# Patient Record
Sex: MALE | Race: WHITE | Employment: OTHER | URBAN - METROPOLITAN AREA
[De-identification: names, ages, dates, MRNs, and addresses within clinical notes are randomized per-mention and may not be internally consistent; named-entity substitution may affect disease eponyms.]

---

## 2017-01-01 ENCOUNTER — TELEPHONE (OUTPATIENT)
Dept: CARDIOLOGY | Age: 82
End: 2017-01-01

## 2017-01-01 RX ORDER — METOPROLOL TARTRATE 50 MG/1
50 TABLET, FILM COATED ORAL 2 TIMES DAILY
Qty: 60 TABLET | Refills: 0 | Status: SHIPPED | OUTPATIENT
Start: 2017-01-01 | End: 2018-01-01 | Stop reason: SDUPTHER

## 2017-01-01 RX ORDER — METOPROLOL TARTRATE 50 MG/1
TABLET, FILM COATED ORAL
Qty: 60 TABLET | Refills: 0 | Status: SHIPPED | OUTPATIENT
Start: 2017-01-01 | End: 2017-01-01 | Stop reason: SDUPTHER

## 2017-01-01 RX ORDER — DIGOXIN 125 MCG
125 TABLET ORAL EVERY MORNING
Qty: 30 TABLET | Refills: 0 | Status: SHIPPED | OUTPATIENT
Start: 2017-01-01

## 2017-03-30 ENCOUNTER — HOSPITAL ENCOUNTER (OUTPATIENT)
Dept: CARDIOLOGY | Age: 82
Discharge: HOME | End: 2017-03-30
Payer: MEDICARE

## 2017-03-30 PROCEDURE — 93296 REM INTERROG EVL PM/IDS: CPT

## 2017-08-25 RX ORDER — DIGOXIN 125 MCG
125 TABLET ORAL EVERY MORNING
Qty: 90 TABLET | Refills: 3 | Status: SHIPPED | OUTPATIENT
Start: 2017-08-25 | End: 2017-01-01 | Stop reason: SDUPTHER

## 2017-09-26 RX ORDER — MOMETASONE FUROATE 1 MG/G
CREAM TOPICAL
Qty: 50 G | Refills: 1 | Status: SHIPPED | OUTPATIENT
Start: 2017-09-26

## 2017-12-14 NOTE — TELEPHONE ENCOUNTER
PATIENT ACCIDENTALLY REQUESTED THE WRONG MEDICATION TO BE FILLED. PATIENT NEEDED HIS METOPROLOL REFILLED.  PATIENT IS AWARE THAT RX WAS SENT TO REQUESTED PHARMACY

## 2017-12-14 NOTE — TELEPHONE ENCOUNTER
PATIENT MADE AN APPOINTMENT FOR DREA. A 30 DAY SUPPLY OF DIGOXIN WAS SENT TO REQUESTED PHARMACY. PATIENT IS AWARE THAT HE HAS TO KEEP UPCOMMING APPOINTMENT FOR FURTHER REFILLS.

## 2018-01-01 ENCOUNTER — APPOINTMENT (OUTPATIENT)
Dept: INTERVENTIONAL RADIOLOGY/VASCULAR | Age: 83
DRG: 377 | End: 2018-01-01
Payer: MEDICARE

## 2018-01-01 ENCOUNTER — APPOINTMENT (OUTPATIENT)
Dept: GENERAL RADIOLOGY | Age: 83
DRG: 377 | End: 2018-01-01
Payer: MEDICARE

## 2018-01-01 ENCOUNTER — ANESTHESIA EVENT (OUTPATIENT)
Dept: OPERATING ROOM | Age: 83
DRG: 377 | End: 2018-01-01
Payer: MEDICARE

## 2018-01-01 ENCOUNTER — TELEPHONE (OUTPATIENT)
Dept: PHARMACY | Facility: CLINIC | Age: 83
End: 2018-01-01

## 2018-01-01 ENCOUNTER — CARE COORDINATION (OUTPATIENT)
Dept: CASE MANAGEMENT | Age: 83
End: 2018-01-01

## 2018-01-01 ENCOUNTER — APPOINTMENT (OUTPATIENT)
Dept: CT IMAGING | Age: 83
DRG: 310 | End: 2018-01-01
Attending: INTERNAL MEDICINE
Payer: MEDICARE

## 2018-01-01 ENCOUNTER — APPOINTMENT (OUTPATIENT)
Dept: CT IMAGING | Age: 83
DRG: 377 | End: 2018-01-01
Payer: MEDICARE

## 2018-01-01 ENCOUNTER — HOSPITAL ENCOUNTER (INPATIENT)
Age: 83
LOS: 2 days | Discharge: HOME | DRG: 310 | End: 2018-08-16
Attending: INTERNAL MEDICINE | Admitting: INTERNAL MEDICINE
Payer: MEDICARE

## 2018-01-01 ENCOUNTER — APPOINTMENT (OUTPATIENT)
Dept: ULTRASOUND IMAGING | Age: 83
DRG: 310 | End: 2018-01-01
Attending: INTERNAL MEDICINE
Payer: MEDICARE

## 2018-01-01 ENCOUNTER — ANESTHESIA (OUTPATIENT)
Dept: OPERATING ROOM | Age: 83
DRG: 377 | End: 2018-01-01
Payer: MEDICARE

## 2018-01-01 ENCOUNTER — HOSPITAL ENCOUNTER (INPATIENT)
Age: 83
LOS: 20 days | DRG: 377 | End: 2018-11-04
Attending: INTERNAL MEDICINE
Payer: MEDICARE

## 2018-01-01 VITALS
HEART RATE: 106 BPM | SYSTOLIC BLOOD PRESSURE: 140 MMHG | OXYGEN SATURATION: 93 % | HEIGHT: 70 IN | BODY MASS INDEX: 24.71 KG/M2 | RESPIRATION RATE: 26 BRPM | DIASTOLIC BLOOD PRESSURE: 79 MMHG | WEIGHT: 172.62 LBS | TEMPERATURE: 97.8 F

## 2018-01-01 VITALS — OXYGEN SATURATION: 94 % | SYSTOLIC BLOOD PRESSURE: 90 MMHG | DIASTOLIC BLOOD PRESSURE: 51 MMHG

## 2018-01-01 VITALS
RESPIRATION RATE: 16 BRPM | HEART RATE: 62 BPM | SYSTOLIC BLOOD PRESSURE: 112 MMHG | HEIGHT: 70 IN | OXYGEN SATURATION: 99 % | DIASTOLIC BLOOD PRESSURE: 73 MMHG | TEMPERATURE: 97.8 F | WEIGHT: 160.8 LBS | BODY MASS INDEX: 23.02 KG/M2

## 2018-01-01 VITALS — SYSTOLIC BLOOD PRESSURE: 76 MMHG | DIASTOLIC BLOOD PRESSURE: 39 MMHG | OXYGEN SATURATION: 98 %

## 2018-01-01 DIAGNOSIS — N17.9 ACUTE RENAL FAILURE, UNSPECIFIED ACUTE RENAL FAILURE TYPE (HCC): ICD-10-CM

## 2018-01-01 DIAGNOSIS — J96.01 ACUTE RESPIRATORY FAILURE WITH HYPOXIA (HCC): ICD-10-CM

## 2018-01-01 DIAGNOSIS — M62.82 NON-TRAUMATIC RHABDOMYOLYSIS: ICD-10-CM

## 2018-01-01 DIAGNOSIS — E03.9 HYPOTHYROIDISM, UNSPECIFIED TYPE: ICD-10-CM

## 2018-01-01 DIAGNOSIS — R77.8 ELEVATED TROPONIN: ICD-10-CM

## 2018-01-01 DIAGNOSIS — R55 PRE-SYNCOPE: Primary | ICD-10-CM

## 2018-01-01 DIAGNOSIS — K92.2 GASTROINTESTINAL HEMORRHAGE, UNSPECIFIED GASTROINTESTINAL HEMORRHAGE TYPE: Primary | ICD-10-CM

## 2018-01-01 LAB
ABO/RH: NORMAL
ALBUMIN SERPL-MCNC: 2.3 G/DL (ref 3.9–4.9)
ALBUMIN SERPL-MCNC: 2.8 G/DL (ref 3.9–4.9)
ALBUMIN SERPL-MCNC: 2.9 G/DL (ref 3.9–4.9)
ALBUMIN SERPL-MCNC: 3.3 G/DL (ref 3.9–4.9)
ALBUMIN SERPL-MCNC: 3.3 G/DL (ref 3.9–4.9)
ALP BLD-CCNC: 55 U/L (ref 35–104)
ALP BLD-CCNC: 55 U/L (ref 35–104)
ALP BLD-CCNC: 70 U/L (ref 35–104)
ALP BLD-CCNC: 93 U/L (ref 35–104)
ALP BLD-CCNC: 98 U/L (ref 35–104)
ALT SERPL-CCNC: 107 U/L (ref 0–41)
ALT SERPL-CCNC: 116 U/L (ref 0–41)
ALT SERPL-CCNC: 35 U/L (ref 0–41)
ALT SERPL-CCNC: 41 U/L (ref 0–41)
ALT SERPL-CCNC: 97 U/L (ref 0–41)
AMORPHOUS: ABNORMAL
AMPHETAMINE SCREEN, URINE: NORMAL
ANION GAP SERPL CALCULATED.3IONS-SCNC: 10 MEQ/L (ref 7–13)
ANION GAP SERPL CALCULATED.3IONS-SCNC: 11 MEQ/L (ref 7–13)
ANION GAP SERPL CALCULATED.3IONS-SCNC: 12 MEQ/L (ref 7–13)
ANION GAP SERPL CALCULATED.3IONS-SCNC: 13 MEQ/L (ref 7–13)
ANION GAP SERPL CALCULATED.3IONS-SCNC: 14 MEQ/L (ref 7–13)
ANION GAP SERPL CALCULATED.3IONS-SCNC: 21 MEQ/L (ref 7–13)
ANION GAP SERPL CALCULATED.3IONS-SCNC: 22 MEQ/L (ref 7–13)
ANION GAP SERPL CALCULATED.3IONS-SCNC: 26 MEQ/L (ref 7–13)
ANION GAP SERPL CALCULATED.3IONS-SCNC: 7 MEQ/L (ref 7–13)
ANION GAP SERPL CALCULATED.3IONS-SCNC: 9 MEQ/L (ref 7–13)
ANISOCYTOSIS: ABNORMAL
ANISOCYTOSIS: ABNORMAL
ANTIBODY SCREEN: NORMAL
AST SERPL-CCNC: 110 U/L (ref 0–40)
AST SERPL-CCNC: 299 U/L (ref 0–40)
AST SERPL-CCNC: 308 U/L (ref 0–40)
AST SERPL-CCNC: 42 U/L (ref 0–40)
AST SERPL-CCNC: 61 U/L (ref 0–40)
BACTERIA: ABNORMAL /HPF
BACTERIA: ABNORMAL /HPF
BANDED NEUTROPHILS RELATIVE PERCENT: 2 %
BARBITURATE SCREEN URINE: NORMAL
BASE EXCESS ARTERIAL: 0 (ref -3–3)
BASE EXCESS ARTERIAL: 1 (ref -3–3)
BASE EXCESS ARTERIAL: 5 (ref -3–3)
BASE EXCESS ARTERIAL: 6 (ref -3–3)
BASE EXCESS ARTERIAL: 7 (ref -3–3)
BASE EXCESS ARTERIAL: 8 (ref -3–3)
BASE EXCESS ARTERIAL: 8 (ref -3–3)
BASOPHILS ABSOLUTE: 0 K/UL (ref 0–0.2)
BASOPHILS ABSOLUTE: 0.1 K/UL (ref 0–0.2)
BASOPHILS ABSOLUTE: 0.2 K/UL (ref 0–0.2)
BASOPHILS RELATIVE PERCENT: 0 %
BASOPHILS RELATIVE PERCENT: 0.1 %
BASOPHILS RELATIVE PERCENT: 0.2 %
BASOPHILS RELATIVE PERCENT: 1 %
BASOPHILS RELATIVE PERCENT: 2.7 %
BENZODIAZEPINE SCREEN, URINE: NORMAL
BILIRUB SERPL-MCNC: 0.8 MG/DL (ref 0–1.2)
BILIRUB SERPL-MCNC: 1.8 MG/DL (ref 0–1.2)
BILIRUBIN DIRECT: 0.3 MG/DL (ref 0–0.3)
BILIRUBIN DIRECT: 0.5 MG/DL (ref 0–0.3)
BILIRUBIN DIRECT: 0.9 MG/DL (ref 0–0.3)
BILIRUBIN URINE: NEGATIVE
BILIRUBIN URINE: NEGATIVE
BILIRUBIN, INDIRECT: 0.3 MG/DL (ref 0–0.6)
BILIRUBIN, INDIRECT: 0.5 MG/DL (ref 0–0.6)
BILIRUBIN, INDIRECT: 0.9 MG/DL (ref 0–0.6)
BLOOD BANK DISPENSE STATUS: NORMAL
BLOOD BANK PRODUCT CODE: NORMAL
BLOOD CULTURE, ROUTINE: NORMAL
BLOOD, URINE: ABNORMAL
BLOOD, URINE: ABNORMAL
BPU ID: NORMAL
BUN BLDV-MCNC: 105 MG/DL (ref 8–23)
BUN BLDV-MCNC: 108 MG/DL (ref 8–23)
BUN BLDV-MCNC: 113 MG/DL (ref 8–23)
BUN BLDV-MCNC: 118 MG/DL (ref 8–23)
BUN BLDV-MCNC: 16 MG/DL (ref 8–23)
BUN BLDV-MCNC: 17 MG/DL (ref 8–23)
BUN BLDV-MCNC: 18 MG/DL (ref 8–23)
BUN BLDV-MCNC: 18 MG/DL (ref 8–23)
BUN BLDV-MCNC: 19 MG/DL (ref 8–23)
BUN BLDV-MCNC: 21 MG/DL (ref 8–23)
BUN BLDV-MCNC: 22 MG/DL (ref 8–23)
BUN BLDV-MCNC: 23 MG/DL (ref 8–23)
BUN BLDV-MCNC: 23 MG/DL (ref 8–23)
BUN BLDV-MCNC: 24 MG/DL (ref 8–23)
BUN BLDV-MCNC: 24 MG/DL (ref 8–23)
BUN BLDV-MCNC: 27 MG/DL (ref 8–23)
BUN BLDV-MCNC: 28 MG/DL (ref 8–23)
BUN BLDV-MCNC: 37 MG/DL (ref 8–23)
BUN BLDV-MCNC: 44 MG/DL (ref 8–23)
BUN BLDV-MCNC: 44 MG/DL (ref 8–23)
BUN BLDV-MCNC: 58 MG/DL (ref 8–23)
BUN BLDV-MCNC: 59 MG/DL (ref 8–23)
BUN BLDV-MCNC: 59 MG/DL (ref 8–23)
BUN BLDV-MCNC: 60 MG/DL (ref 8–23)
BUN BLDV-MCNC: 66 MG/DL (ref 8–23)
BUN BLDV-MCNC: 68 MG/DL (ref 8–23)
BUN BLDV-MCNC: 68 MG/DL (ref 8–23)
BUN BLDV-MCNC: 74 MG/DL (ref 8–23)
BUN BLDV-MCNC: 81 MG/DL (ref 8–23)
BUN BLDV-MCNC: 86 MG/DL (ref 8–23)
CALCIUM IONIZED: 1.11 MMOL/L (ref 1.12–1.32)
CALCIUM IONIZED: 1.12 MMOL/L (ref 1.12–1.32)
CALCIUM IONIZED: 1.14 MMOL/L (ref 1.12–1.32)
CALCIUM IONIZED: 1.15 MMOL/L (ref 1.12–1.32)
CALCIUM IONIZED: 1.2 MMOL/L (ref 1.12–1.32)
CALCIUM SERPL-MCNC: 6.4 MG/DL (ref 8.6–10.2)
CALCIUM SERPL-MCNC: 6.7 MG/DL (ref 8.6–10.2)
CALCIUM SERPL-MCNC: 6.8 MG/DL (ref 8.6–10.2)
CALCIUM SERPL-MCNC: 6.9 MG/DL (ref 8.6–10.2)
CALCIUM SERPL-MCNC: 7.1 MG/DL (ref 8.6–10.2)
CALCIUM SERPL-MCNC: 7.2 MG/DL (ref 8.6–10.2)
CALCIUM SERPL-MCNC: 7.3 MG/DL (ref 8.6–10.2)
CALCIUM SERPL-MCNC: 7.4 MG/DL (ref 8.6–10.2)
CALCIUM SERPL-MCNC: 7.4 MG/DL (ref 8.6–10.2)
CALCIUM SERPL-MCNC: 7.5 MG/DL (ref 8.6–10.2)
CALCIUM SERPL-MCNC: 7.6 MG/DL (ref 8.6–10.2)
CALCIUM SERPL-MCNC: 7.6 MG/DL (ref 8.6–10.2)
CALCIUM SERPL-MCNC: 7.7 MG/DL (ref 8.6–10.2)
CALCIUM SERPL-MCNC: 7.8 MG/DL (ref 8.6–10.2)
CALCIUM SERPL-MCNC: 7.9 MG/DL (ref 8.6–10.2)
CALCIUM SERPL-MCNC: 8 MG/DL (ref 8.6–10.2)
CALCIUM SERPL-MCNC: 9 MG/DL (ref 8.6–10.2)
CALCIUM SERPL-MCNC: 9.1 MG/DL (ref 8.6–10.2)
CALCIUM SERPL-MCNC: 9.4 MG/DL (ref 8.6–10.2)
CANNABINOID SCREEN URINE: NORMAL
CASTS UA: ABNORMAL /LPF
CHLORIDE BLD-SCNC: 101 MEQ/L (ref 98–107)
CHLORIDE BLD-SCNC: 103 MEQ/L (ref 98–107)
CHLORIDE BLD-SCNC: 105 MEQ/L (ref 98–107)
CHLORIDE BLD-SCNC: 106 MEQ/L (ref 98–107)
CHLORIDE BLD-SCNC: 107 MEQ/L (ref 98–107)
CHLORIDE BLD-SCNC: 107 MEQ/L (ref 98–107)
CHLORIDE BLD-SCNC: 108 MEQ/L (ref 98–107)
CHLORIDE BLD-SCNC: 109 MEQ/L (ref 98–107)
CHLORIDE BLD-SCNC: 110 MEQ/L (ref 98–107)
CHLORIDE BLD-SCNC: 112 MEQ/L (ref 98–107)
CHLORIDE BLD-SCNC: 115 MEQ/L (ref 98–107)
CHLORIDE BLD-SCNC: 115 MEQ/L (ref 98–107)
CHLORIDE BLD-SCNC: 116 MEQ/L (ref 98–107)
CHLORIDE BLD-SCNC: 120 MEQ/L (ref 98–107)
CHLORIDE BLD-SCNC: 120 MEQ/L (ref 98–107)
CHLORIDE BLD-SCNC: 123 MEQ/L (ref 98–107)
CHLORIDE BLD-SCNC: 125 MEQ/L (ref 98–107)
CHLORIDE BLD-SCNC: 92 MEQ/L (ref 98–107)
CHLORIDE BLD-SCNC: 98 MEQ/L (ref 98–107)
CHLORIDE BLD-SCNC: 99 MEQ/L (ref 98–107)
CK MB: 18.1 NG/ML (ref 0–6.7)
CK MB: 229.4 NG/ML (ref 0–6.7)
CK MB: 351.9 NG/ML (ref 0–6.7)
CK MB: 4.9 NG/ML (ref 0–6.7)
CK MB: 47.6 NG/ML (ref 0–6.7)
CK MB: 8.2 NG/ML (ref 0–6.7)
CLARITY: ABNORMAL
CLARITY: CLEAR
CO2: 14 MEQ/L (ref 22–29)
CO2: 14 MEQ/L (ref 22–29)
CO2: 16 MEQ/L (ref 22–29)
CO2: 20 MEQ/L (ref 22–29)
CO2: 21 MEQ/L (ref 22–29)
CO2: 21 MEQ/L (ref 22–29)
CO2: 22 MEQ/L (ref 22–29)
CO2: 23 MEQ/L (ref 22–29)
CO2: 24 MEQ/L (ref 22–29)
CO2: 25 MEQ/L (ref 22–29)
CO2: 25 MEQ/L (ref 22–29)
CO2: 26 MEQ/L (ref 22–29)
CO2: 27 MEQ/L (ref 22–29)
CO2: 28 MEQ/L (ref 22–29)
CO2: 29 MEQ/L (ref 22–29)
CO2: 31 MEQ/L (ref 22–29)
CO2: 32 MEQ/L (ref 22–29)
COCAINE METABOLITE SCREEN URINE: NORMAL
COLOR: YELLOW
COLOR: YELLOW
CORTISOL TOTAL: 17.8 UG/DL
CREAT SERPL-MCNC: 0.76 MG/DL (ref 0.7–1.2)
CREAT SERPL-MCNC: 0.77 MG/DL (ref 0.7–1.2)
CREAT SERPL-MCNC: 0.78 MG/DL (ref 0.7–1.2)
CREAT SERPL-MCNC: 0.79 MG/DL (ref 0.7–1.2)
CREAT SERPL-MCNC: 0.79 MG/DL (ref 0.7–1.2)
CREAT SERPL-MCNC: 0.8 MG/DL (ref 0.7–1.2)
CREAT SERPL-MCNC: 0.81 MG/DL (ref 0.7–1.2)
CREAT SERPL-MCNC: 0.85 MG/DL (ref 0.7–1.2)
CREAT SERPL-MCNC: 0.86 MG/DL (ref 0.7–1.2)
CREAT SERPL-MCNC: 0.92 MG/DL (ref 0.7–1.2)
CREAT SERPL-MCNC: 0.92 MG/DL (ref 0.7–1.2)
CREAT SERPL-MCNC: 0.93 MG/DL (ref 0.7–1.2)
CREAT SERPL-MCNC: 0.93 MG/DL (ref 0.7–1.2)
CREAT SERPL-MCNC: 0.95 MG/DL (ref 0.7–1.2)
CREAT SERPL-MCNC: 1.01 MG/DL (ref 0.7–1.2)
CREAT SERPL-MCNC: 1.03 MG/DL (ref 0.7–1.2)
CREAT SERPL-MCNC: 1.04 MG/DL (ref 0.7–1.2)
CREAT SERPL-MCNC: 1.05 MG/DL (ref 0.7–1.2)
CREAT SERPL-MCNC: 1.09 MG/DL (ref 0.7–1.2)
CREAT SERPL-MCNC: 1.09 MG/DL (ref 0.7–1.2)
CREAT SERPL-MCNC: 1.11 MG/DL (ref 0.7–1.2)
CREAT SERPL-MCNC: 1.16 MG/DL (ref 0.7–1.2)
CREAT SERPL-MCNC: 1.19 MG/DL (ref 0.7–1.2)
CREAT SERPL-MCNC: 1.23 MG/DL (ref 0.7–1.2)
CREAT SERPL-MCNC: 1.42 MG/DL (ref 0.7–1.2)
CREAT SERPL-MCNC: 1.44 MG/DL (ref 0.7–1.2)
CREAT SERPL-MCNC: 1.47 MG/DL (ref 0.7–1.2)
CREAT SERPL-MCNC: 1.51 MG/DL (ref 0.7–1.2)
CREAT SERPL-MCNC: 2.23 MG/DL (ref 0.7–1.2)
CREAT SERPL-MCNC: 3.28 MG/DL (ref 0.7–1.2)
CREAT SERPL-MCNC: 3.63 MG/DL (ref 0.7–1.2)
CREAT SERPL-MCNC: 3.66 MG/DL (ref 0.7–1.2)
CREATINE KINASE-MB INDEX: 0.5 % (ref 0–3.5)
CREATINE KINASE-MB INDEX: 0.6 % (ref 0–3.5)
CREATINE KINASE-MB INDEX: 0.6 % (ref 0–3.5)
CREATINE KINASE-MB INDEX: 0.8 % (ref 0–3.5)
CREATINE KINASE-MB INDEX: 2.6 % (ref 0–3.5)
CREATINE KINASE-MB INDEX: 3.8 % (ref 0–3.5)
CULTURE, BLOOD 2: NORMAL
DESCRIPTION BLOOD BANK: NORMAL
DIGOXIN LEVEL: 0.7 NG/ML (ref 0.8–2)
EKG ATRIAL RATE: 100 BPM
EKG ATRIAL RATE: 133 BPM
EKG ATRIAL RATE: 250 BPM
EKG ATRIAL RATE: 258 BPM
EKG ATRIAL RATE: 288 BPM
EKG ATRIAL RATE: 326 BPM
EKG ATRIAL RATE: 357 BPM
EKG ATRIAL RATE: 37 BPM
EKG ATRIAL RATE: 38 BPM
EKG ATRIAL RATE: 53 BPM
EKG ATRIAL RATE: 58 BPM
EKG ATRIAL RATE: 67 BPM
EKG ATRIAL RATE: 68 BPM
EKG ATRIAL RATE: 70 BPM
EKG ATRIAL RATE: 76 BPM
EKG ATRIAL RATE: 79 BPM
EKG ATRIAL RATE: 85 BPM
EKG ATRIAL RATE: 93 BPM
EKG P AXIS: 97 DEGREES
EKG P-R INTERVAL: 152 MS
EKG Q-T INTERVAL: 326 MS
EKG Q-T INTERVAL: 402 MS
EKG Q-T INTERVAL: 410 MS
EKG Q-T INTERVAL: 414 MS
EKG Q-T INTERVAL: 416 MS
EKG Q-T INTERVAL: 422 MS
EKG Q-T INTERVAL: 428 MS
EKG Q-T INTERVAL: 444 MS
EKG Q-T INTERVAL: 448 MS
EKG Q-T INTERVAL: 452 MS
EKG Q-T INTERVAL: 458 MS
EKG Q-T INTERVAL: 460 MS
EKG Q-T INTERVAL: 470 MS
EKG Q-T INTERVAL: 478 MS
EKG Q-T INTERVAL: 496 MS
EKG Q-T INTERVAL: 544 MS
EKG Q-T INTERVAL: 546 MS
EKG Q-T INTERVAL: 552 MS
EKG QRS DURATION: 152 MS
EKG QRS DURATION: 154 MS
EKG QRS DURATION: 156 MS
EKG QRS DURATION: 158 MS
EKG QRS DURATION: 158 MS
EKG QRS DURATION: 166 MS
EKG QRS DURATION: 168 MS
EKG QRS DURATION: 170 MS
EKG QRS DURATION: 172 MS
EKG QRS DURATION: 174 MS
EKG QRS DURATION: 176 MS
EKG QRS DURATION: 178 MS
EKG QRS DURATION: 184 MS
EKG QTC CALCULATION (BAZETT): 435 MS
EKG QTC CALCULATION (BAZETT): 436 MS
EKG QTC CALCULATION (BAZETT): 436 MS
EKG QTC CALCULATION (BAZETT): 446 MS
EKG QTC CALCULATION (BAZETT): 448 MS
EKG QTC CALCULATION (BAZETT): 456 MS
EKG QTC CALCULATION (BAZETT): 460 MS
EKG QTC CALCULATION (BAZETT): 486 MS
EKG QTC CALCULATION (BAZETT): 508 MS
EKG QTC CALCULATION (BAZETT): 528 MS
EKG QTC CALCULATION (BAZETT): 531 MS
EKG QTC CALCULATION (BAZETT): 540 MS
EKG QTC CALCULATION (BAZETT): 552 MS
EKG QTC CALCULATION (BAZETT): 554 MS
EKG QTC CALCULATION (BAZETT): 561 MS
EKG QTC CALCULATION (BAZETT): 565 MS
EKG QTC CALCULATION (BAZETT): 574 MS
EKG QTC CALCULATION (BAZETT): 586 MS
EKG R AXIS: -10 DEGREES
EKG R AXIS: -12 DEGREES
EKG R AXIS: -13 DEGREES
EKG R AXIS: -18 DEGREES
EKG R AXIS: -19 DEGREES
EKG R AXIS: -21 DEGREES
EKG R AXIS: -22 DEGREES
EKG R AXIS: -23 DEGREES
EKG R AXIS: -24 DEGREES
EKG R AXIS: -24 DEGREES
EKG R AXIS: -31 DEGREES
EKG R AXIS: -35 DEGREES
EKG R AXIS: -35 DEGREES
EKG R AXIS: -60 DEGREES
EKG R AXIS: -61 DEGREES
EKG R AXIS: -65 DEGREES
EKG R AXIS: -67 DEGREES
EKG R AXIS: 1 DEGREES
EKG T AXIS: 121 DEGREES
EKG T AXIS: 128 DEGREES
EKG T AXIS: 132 DEGREES
EKG T AXIS: 134 DEGREES
EKG T AXIS: 136 DEGREES
EKG T AXIS: 142 DEGREES
EKG T AXIS: 148 DEGREES
EKG T AXIS: 160 DEGREES
EKG T AXIS: 160 DEGREES
EKG T AXIS: 162 DEGREES
EKG T AXIS: 169 DEGREES
EKG T AXIS: 173 DEGREES
EKG T AXIS: 177 DEGREES
EKG T AXIS: 187 DEGREES
EKG T AXIS: 78 DEGREES
EKG T AXIS: 79 DEGREES
EKG T AXIS: 82 DEGREES
EKG T AXIS: 85 DEGREES
EKG VENTRICULAR RATE: 100 BPM
EKG VENTRICULAR RATE: 103 BPM
EKG VENTRICULAR RATE: 108 BPM
EKG VENTRICULAR RATE: 61 BPM
EKG VENTRICULAR RATE: 62 BPM
EKG VENTRICULAR RATE: 65 BPM
EKG VENTRICULAR RATE: 65 BPM
EKG VENTRICULAR RATE: 66 BPM
EKG VENTRICULAR RATE: 67 BPM
EKG VENTRICULAR RATE: 68 BPM
EKG VENTRICULAR RATE: 68 BPM
EKG VENTRICULAR RATE: 73 BPM
EKG VENTRICULAR RATE: 74 BPM
EKG VENTRICULAR RATE: 77 BPM
EKG VENTRICULAR RATE: 79 BPM
EKG VENTRICULAR RATE: 82 BPM
EOSINOPHILS ABSOLUTE: 0 K/UL (ref 0–0.7)
EOSINOPHILS ABSOLUTE: 0.1 K/UL (ref 0–0.7)
EOSINOPHILS ABSOLUTE: 0.2 K/UL (ref 0–0.7)
EOSINOPHILS RELATIVE PERCENT: 0 %
EOSINOPHILS RELATIVE PERCENT: 0.2 %
EOSINOPHILS RELATIVE PERCENT: 0.2 %
EOSINOPHILS RELATIVE PERCENT: 1 %
EOSINOPHILS RELATIVE PERCENT: 3 %
EPITHELIAL CELLS, UA: ABNORMAL /HPF
EPITHELIAL CELLS, UA: ABNORMAL /HPF
FIBRINOGEN: 270.7 MG/DL (ref 200–400)
GFR AFRICAN AMERICAN: 19.4
GFR AFRICAN AMERICAN: 19.6
GFR AFRICAN AMERICAN: 22
GFR AFRICAN AMERICAN: 34.3
GFR AFRICAN AMERICAN: 44
GFR AFRICAN AMERICAN: 47
GFR AFRICAN AMERICAN: 53.9
GFR AFRICAN AMERICAN: 54
GFR AFRICAN AMERICAN: 55.6
GFR AFRICAN AMERICAN: 56.9
GFR AFRICAN AMERICAN: 57.8
GFR AFRICAN AMERICAN: 59
GFR AFRICAN AMERICAN: >60
GFR NON-AFRICAN AMERICAN: 16
GFR NON-AFRICAN AMERICAN: 16.2
GFR NON-AFRICAN AMERICAN: 18.2
GFR NON-AFRICAN AMERICAN: 28.4
GFR NON-AFRICAN AMERICAN: 36
GFR NON-AFRICAN AMERICAN: 39
GFR NON-AFRICAN AMERICAN: 44.5
GFR NON-AFRICAN AMERICAN: 45
GFR NON-AFRICAN AMERICAN: 45.9
GFR NON-AFRICAN AMERICAN: 47
GFR NON-AFRICAN AMERICAN: 47.8
GFR NON-AFRICAN AMERICAN: 49
GFR NON-AFRICAN AMERICAN: 56.4
GFR NON-AFRICAN AMERICAN: 58.6
GFR NON-AFRICAN AMERICAN: >60
GLOBULIN: 2.1 G/DL (ref 2.3–3.5)
GLOBULIN: 2.2 G/DL (ref 2.3–3.5)
GLUCOSE BLD-MCNC: 104 MG/DL (ref 74–109)
GLUCOSE BLD-MCNC: 104 MG/DL (ref 74–109)
GLUCOSE BLD-MCNC: 106 MG/DL (ref 74–109)
GLUCOSE BLD-MCNC: 113 MG/DL (ref 74–109)
GLUCOSE BLD-MCNC: 113 MG/DL (ref 74–109)
GLUCOSE BLD-MCNC: 115 MG/DL (ref 74–109)
GLUCOSE BLD-MCNC: 116 MG/DL (ref 74–109)
GLUCOSE BLD-MCNC: 116 MG/DL (ref 74–109)
GLUCOSE BLD-MCNC: 120 MG/DL (ref 74–109)
GLUCOSE BLD-MCNC: 121 MG/DL (ref 60–115)
GLUCOSE BLD-MCNC: 121 MG/DL (ref 74–109)
GLUCOSE BLD-MCNC: 123 MG/DL (ref 74–109)
GLUCOSE BLD-MCNC: 124 MG/DL (ref 74–109)
GLUCOSE BLD-MCNC: 127 MG/DL (ref 60–115)
GLUCOSE BLD-MCNC: 128 MG/DL (ref 74–109)
GLUCOSE BLD-MCNC: 131 MG/DL (ref 60–115)
GLUCOSE BLD-MCNC: 131 MG/DL (ref 74–109)
GLUCOSE BLD-MCNC: 132 MG/DL (ref 60–115)
GLUCOSE BLD-MCNC: 135 MG/DL (ref 60–115)
GLUCOSE BLD-MCNC: 135 MG/DL (ref 74–109)
GLUCOSE BLD-MCNC: 149 MG/DL (ref 60–115)
GLUCOSE BLD-MCNC: 155 MG/DL (ref 60–115)
GLUCOSE BLD-MCNC: 158 MG/DL (ref 60–115)
GLUCOSE BLD-MCNC: 158 MG/DL (ref 74–109)
GLUCOSE BLD-MCNC: 159 MG/DL (ref 60–115)
GLUCOSE BLD-MCNC: 159 MG/DL (ref 60–115)
GLUCOSE BLD-MCNC: 159 MG/DL (ref 74–109)
GLUCOSE BLD-MCNC: 160 MG/DL (ref 60–115)
GLUCOSE BLD-MCNC: 160 MG/DL (ref 60–115)
GLUCOSE BLD-MCNC: 162 MG/DL (ref 60–115)
GLUCOSE BLD-MCNC: 165 MG/DL (ref 60–115)
GLUCOSE BLD-MCNC: 165 MG/DL (ref 74–109)
GLUCOSE BLD-MCNC: 173 MG/DL (ref 60–115)
GLUCOSE BLD-MCNC: 174 MG/DL (ref 60–115)
GLUCOSE BLD-MCNC: 176 MG/DL (ref 60–115)
GLUCOSE BLD-MCNC: 184 MG/DL (ref 74–109)
GLUCOSE BLD-MCNC: 194 MG/DL (ref 60–115)
GLUCOSE BLD-MCNC: 194 MG/DL (ref 60–115)
GLUCOSE BLD-MCNC: 203 MG/DL (ref 60–115)
GLUCOSE BLD-MCNC: 205 MG/DL (ref 60–115)
GLUCOSE BLD-MCNC: 206 MG/DL (ref 74–109)
GLUCOSE BLD-MCNC: 207 MG/DL (ref 60–115)
GLUCOSE BLD-MCNC: 208 MG/DL (ref 60–115)
GLUCOSE BLD-MCNC: 214 MG/DL (ref 74–109)
GLUCOSE BLD-MCNC: 220 MG/DL (ref 74–109)
GLUCOSE BLD-MCNC: 225 MG/DL (ref 60–115)
GLUCOSE BLD-MCNC: 227 MG/DL (ref 60–115)
GLUCOSE BLD-MCNC: 229 MG/DL (ref 60–115)
GLUCOSE BLD-MCNC: 230 MG/DL (ref 60–115)
GLUCOSE BLD-MCNC: 233 MG/DL (ref 60–115)
GLUCOSE BLD-MCNC: 249 MG/DL (ref 60–115)
GLUCOSE BLD-MCNC: 260 MG/DL (ref 60–115)
GLUCOSE BLD-MCNC: 294 MG/DL (ref 74–109)
GLUCOSE BLD-MCNC: 353 MG/DL (ref 60–115)
GLUCOSE BLD-MCNC: 605 MG/DL (ref 74–109)
GLUCOSE BLD-MCNC: 75 MG/DL (ref 74–109)
GLUCOSE BLD-MCNC: 77 MG/DL (ref 74–109)
GLUCOSE BLD-MCNC: 79 MG/DL (ref 74–109)
GLUCOSE BLD-MCNC: 81 MG/DL (ref 74–109)
GLUCOSE BLD-MCNC: 92 MG/DL (ref 74–109)
GLUCOSE BLD-MCNC: 93 MG/DL (ref 60–115)
GLUCOSE BLD-MCNC: 96 MG/DL (ref 74–109)
GLUCOSE BLD-MCNC: 96 MG/DL (ref 74–109)
GLUCOSE BLD-MCNC: 97 MG/DL (ref 74–109)
GLUCOSE URINE: NEGATIVE MG/DL
GLUCOSE URINE: NEGATIVE MG/DL
HCO3 ARTERIAL: 24.2 MMOL/L (ref 21–29)
HCO3 ARTERIAL: 24.4 MMOL/L (ref 21–29)
HCO3 ARTERIAL: 28.1 MMOL/L (ref 21–29)
HCO3 ARTERIAL: 30.2 MMOL/L (ref 21–29)
HCO3 ARTERIAL: 31.2 MMOL/L (ref 21–29)
HCO3 ARTERIAL: 31.6 MMOL/L (ref 21–29)
HCO3 ARTERIAL: 31.9 MMOL/L (ref 21–29)
HCO3 ARTERIAL: 32.2 MMOL/L (ref 21–29)
HCO3 ARTERIAL: 33.4 MMOL/L (ref 21–29)
HCO3 ARTERIAL: 34.2 MMOL/L (ref 21–29)
HCT VFR BLD CALC: 14.9 % (ref 42–52)
HCT VFR BLD CALC: 20.3 % (ref 42–52)
HCT VFR BLD CALC: 20.9 % (ref 42–52)
HCT VFR BLD CALC: 21.3 % (ref 42–52)
HCT VFR BLD CALC: 21.6 % (ref 42–52)
HCT VFR BLD CALC: 21.8 % (ref 42–52)
HCT VFR BLD CALC: 22.1 % (ref 42–52)
HCT VFR BLD CALC: 22.5 % (ref 42–52)
HCT VFR BLD CALC: 22.8 % (ref 42–52)
HCT VFR BLD CALC: 22.8 % (ref 42–52)
HCT VFR BLD CALC: 23.1 % (ref 42–52)
HCT VFR BLD CALC: 23.2 % (ref 42–52)
HCT VFR BLD CALC: 23.5 % (ref 42–52)
HCT VFR BLD CALC: 23.6 % (ref 42–52)
HCT VFR BLD CALC: 23.8 % (ref 42–52)
HCT VFR BLD CALC: 23.9 % (ref 42–52)
HCT VFR BLD CALC: 24.1 % (ref 42–52)
HCT VFR BLD CALC: 24.2 % (ref 42–52)
HCT VFR BLD CALC: 24.6 % (ref 42–52)
HCT VFR BLD CALC: 24.6 % (ref 42–52)
HCT VFR BLD CALC: 24.8 % (ref 42–52)
HCT VFR BLD CALC: 24.8 % (ref 42–52)
HCT VFR BLD CALC: 25.2 % (ref 42–52)
HCT VFR BLD CALC: 25.4 % (ref 42–52)
HCT VFR BLD CALC: 25.4 % (ref 42–52)
HCT VFR BLD CALC: 25.8 % (ref 42–52)
HCT VFR BLD CALC: 26.1 % (ref 42–52)
HCT VFR BLD CALC: 26.1 % (ref 42–52)
HCT VFR BLD CALC: 26.8 % (ref 42–52)
HCT VFR BLD CALC: 27 % (ref 42–52)
HCT VFR BLD CALC: 27.1 % (ref 42–52)
HCT VFR BLD CALC: 27.4 % (ref 42–52)
HCT VFR BLD CALC: 38.3 % (ref 42–52)
HCT VFR BLD CALC: 38.7 % (ref 42–52)
HCT VFR BLD CALC: 39.4 % (ref 42–52)
HEMOGLOBIN: 13.2 G/DL (ref 14–18)
HEMOGLOBIN: 13.3 G/DL (ref 14–18)
HEMOGLOBIN: 13.4 GM/DL (ref 13.5–17.5)
HEMOGLOBIN: 13.5 G/DL (ref 14–18)
HEMOGLOBIN: 4.7 G/DL (ref 14–18)
HEMOGLOBIN: 6.5 G/DL (ref 14–18)
HEMOGLOBIN: 6.8 G/DL (ref 14–18)
HEMOGLOBIN: 7 G/DL (ref 14–18)
HEMOGLOBIN: 7 G/DL (ref 14–18)
HEMOGLOBIN: 7.1 G/DL (ref 14–18)
HEMOGLOBIN: 7.3 G/DL (ref 14–18)
HEMOGLOBIN: 7.5 G/DL (ref 14–18)
HEMOGLOBIN: 7.5 GM/DL (ref 13.5–17.5)
HEMOGLOBIN: 7.6 G/DL (ref 14–18)
HEMOGLOBIN: 7.8 G/DL (ref 14–18)
HEMOGLOBIN: 7.9 G/DL (ref 14–18)
HEMOGLOBIN: 8 G/DL (ref 14–18)
HEMOGLOBIN: 8 GM/DL (ref 13.5–17.5)
HEMOGLOBIN: 8.1 G/DL (ref 14–18)
HEMOGLOBIN: 8.2 G/DL (ref 14–18)
HEMOGLOBIN: 8.2 G/DL (ref 14–18)
HEMOGLOBIN: 8.3 G/DL (ref 14–18)
HEMOGLOBIN: 8.4 G/DL (ref 14–18)
HEMOGLOBIN: 8.4 G/DL (ref 14–18)
HEMOGLOBIN: 8.6 G/DL (ref 14–18)
HEMOGLOBIN: 8.7 GM/DL (ref 13.5–17.5)
HEMOGLOBIN: 8.9 G/DL (ref 14–18)
HEMOGLOBIN: 9 G/DL (ref 14–18)
HEMOGLOBIN: 9.3 GM/DL (ref 13.5–17.5)
HEMOGLOBIN: 9.4 GM/DL (ref 13.5–17.5)
HYPOCHROMIA: 0
HYPOCHROMIA: ABNORMAL
INR BLD: 1.6
KETONES, URINE: NEGATIVE MG/DL
KETONES, URINE: NEGATIVE MG/DL
LACTATE: 0.71 MMOL/L (ref 0.4–2)
LACTATE: 0.91 MMOL/L (ref 0.4–2)
LACTATE: 0.99 MMOL/L (ref 0.4–2)
LACTATE: 1.17 MMOL/L (ref 0.4–2)
LACTATE: 1.35 MMOL/L (ref 0.4–2)
LACTATE: 1.43 MMOL/L (ref 0.4–2)
LACTATE: 1.59 MMOL/L (ref 0.4–2)
LACTATE: 1.62 MMOL/L (ref 0.4–2)
LACTATE: 1.62 MMOL/L (ref 0.4–2)
LACTATE: 1.81 MMOL/L (ref 0.4–2)
LACTIC ACID: 1.3 MMOL/L (ref 0.5–2.2)
LACTIC ACID: 2.3 MMOL/L (ref 0.5–2.2)
LEUKOCYTE ESTERASE, URINE: ABNORMAL
LEUKOCYTE ESTERASE, URINE: NEGATIVE
LV EF: 60 %
LV EF: 60 %
LVEF MODALITY: NORMAL
LVEF MODALITY: NORMAL
LYMPHOCYTES ABSOLUTE: 0.5 K/UL (ref 1–4.8)
LYMPHOCYTES ABSOLUTE: 0.6 K/UL (ref 1–4.8)
LYMPHOCYTES ABSOLUTE: 0.8 K/UL (ref 1–4.8)
LYMPHOCYTES ABSOLUTE: 0.8 K/UL (ref 1–4.8)
LYMPHOCYTES ABSOLUTE: 0.9 K/UL (ref 1–4.8)
LYMPHOCYTES ABSOLUTE: 0.9 K/UL (ref 1–4.8)
LYMPHOCYTES ABSOLUTE: 1.1 K/UL (ref 1–4.8)
LYMPHOCYTES RELATIVE PERCENT: 10.5 %
LYMPHOCYTES RELATIVE PERCENT: 12.9 %
LYMPHOCYTES RELATIVE PERCENT: 15 %
LYMPHOCYTES RELATIVE PERCENT: 4.6 %
LYMPHOCYTES RELATIVE PERCENT: 4.8 %
LYMPHOCYTES RELATIVE PERCENT: 8.4 %
LYMPHOCYTES RELATIVE PERCENT: 9.7 %
Lab: NORMAL
MACROCYTES: 0
MAGNESIUM: 1.8 MG/DL (ref 1.7–2.3)
MAGNESIUM: 2 MG/DL (ref 1.7–2.3)
MAGNESIUM: 2 MG/DL (ref 1.7–2.3)
MAGNESIUM: 2.1 MG/DL (ref 1.7–2.3)
MAGNESIUM: 2.1 MG/DL (ref 1.7–2.3)
MAGNESIUM: 2.4 MG/DL (ref 1.7–2.3)
MAGNESIUM: 3.6 MG/DL (ref 1.7–2.3)
MCH RBC QN AUTO: 30.2 PG (ref 27–31.3)
MCH RBC QN AUTO: 30.3 PG (ref 27–31.3)
MCH RBC QN AUTO: 30.5 PG (ref 27–31.3)
MCH RBC QN AUTO: 30.7 PG (ref 27–31.3)
MCH RBC QN AUTO: 30.8 PG (ref 27–31.3)
MCH RBC QN AUTO: 30.9 PG (ref 27–31.3)
MCH RBC QN AUTO: 31 PG (ref 27–31.3)
MCH RBC QN AUTO: 31.1 PG (ref 27–31.3)
MCH RBC QN AUTO: 31.2 PG (ref 27–31.3)
MCH RBC QN AUTO: 31.3 PG (ref 27–31.3)
MCH RBC QN AUTO: 31.4 PG (ref 27–31.3)
MCH RBC QN AUTO: 31.4 PG (ref 27–31.3)
MCH RBC QN AUTO: 31.5 PG (ref 27–31.3)
MCH RBC QN AUTO: 31.5 PG (ref 27–31.3)
MCH RBC QN AUTO: 31.7 PG (ref 27–31.3)
MCH RBC QN AUTO: 31.8 PG (ref 27–31.3)
MCH RBC QN AUTO: 32.1 PG (ref 27–31.3)
MCH RBC QN AUTO: 33.1 PG (ref 27–31.3)
MCH RBC QN AUTO: 35.3 PG (ref 27–31.3)
MCH RBC QN AUTO: 35.5 PG (ref 27–31.3)
MCH RBC QN AUTO: 35.8 PG (ref 27–31.3)
MCHC RBC AUTO-ENTMCNC: 31.5 % (ref 33–37)
MCHC RBC AUTO-ENTMCNC: 32.2 % (ref 33–37)
MCHC RBC AUTO-ENTMCNC: 32.3 % (ref 33–37)
MCHC RBC AUTO-ENTMCNC: 32.4 % (ref 33–37)
MCHC RBC AUTO-ENTMCNC: 32.4 % (ref 33–37)
MCHC RBC AUTO-ENTMCNC: 32.6 % (ref 33–37)
MCHC RBC AUTO-ENTMCNC: 32.7 % (ref 33–37)
MCHC RBC AUTO-ENTMCNC: 32.7 % (ref 33–37)
MCHC RBC AUTO-ENTMCNC: 32.8 % (ref 33–37)
MCHC RBC AUTO-ENTMCNC: 32.9 % (ref 33–37)
MCHC RBC AUTO-ENTMCNC: 33 % (ref 33–37)
MCHC RBC AUTO-ENTMCNC: 33.1 % (ref 33–37)
MCHC RBC AUTO-ENTMCNC: 33.1 % (ref 33–37)
MCHC RBC AUTO-ENTMCNC: 33.2 % (ref 33–37)
MCHC RBC AUTO-ENTMCNC: 33.3 % (ref 33–37)
MCHC RBC AUTO-ENTMCNC: 33.4 % (ref 33–37)
MCHC RBC AUTO-ENTMCNC: 33.9 % (ref 33–37)
MCHC RBC AUTO-ENTMCNC: 34.3 % (ref 33–37)
MCHC RBC AUTO-ENTMCNC: 34.3 % (ref 33–37)
MCHC RBC AUTO-ENTMCNC: 34.4 % (ref 33–37)
MCV RBC AUTO: 103 FL (ref 80–100)
MCV RBC AUTO: 103.5 FL (ref 80–100)
MCV RBC AUTO: 103.9 FL (ref 80–100)
MCV RBC AUTO: 105.1 FL (ref 80–100)
MCV RBC AUTO: 91.3 FL (ref 80–100)
MCV RBC AUTO: 91.3 FL (ref 80–100)
MCV RBC AUTO: 92.9 FL (ref 80–100)
MCV RBC AUTO: 93.6 FL (ref 80–100)
MCV RBC AUTO: 93.6 FL (ref 80–100)
MCV RBC AUTO: 93.9 FL (ref 80–100)
MCV RBC AUTO: 94.6 FL (ref 80–100)
MCV RBC AUTO: 94.7 FL (ref 80–100)
MCV RBC AUTO: 94.8 FL (ref 80–100)
MCV RBC AUTO: 95 FL (ref 80–100)
MCV RBC AUTO: 95.1 FL (ref 80–100)
MCV RBC AUTO: 95.3 FL (ref 80–100)
MCV RBC AUTO: 95.5 FL (ref 80–100)
MCV RBC AUTO: 95.6 FL (ref 80–100)
MCV RBC AUTO: 97.3 FL (ref 80–100)
MCV RBC AUTO: 97.3 FL (ref 80–100)
MCV RBC AUTO: 97.9 FL (ref 80–100)
MONOCYTES ABSOLUTE: 0.2 K/UL (ref 0.2–0.8)
MONOCYTES ABSOLUTE: 0.4 K/UL (ref 0.2–0.8)
MONOCYTES ABSOLUTE: 0.4 K/UL (ref 0.2–0.8)
MONOCYTES ABSOLUTE: 0.5 K/UL (ref 0.2–0.8)
MONOCYTES ABSOLUTE: 0.6 K/UL (ref 0.2–0.8)
MONOCYTES ABSOLUTE: 0.7 K/UL (ref 0.2–0.8)
MONOCYTES ABSOLUTE: 0.7 K/UL (ref 0.2–0.8)
MONOCYTES RELATIVE PERCENT: 2.3 %
MONOCYTES RELATIVE PERCENT: 4.6 %
MONOCYTES RELATIVE PERCENT: 4.6 %
MONOCYTES RELATIVE PERCENT: 4.9 %
MONOCYTES RELATIVE PERCENT: 5.3 %
MONOCYTES RELATIVE PERCENT: 9.5 %
MONOCYTES RELATIVE PERCENT: 9.7 %
MUCUS: PRESENT
NEUTROPHILS ABSOLUTE: 11.4 K/UL (ref 1.4–6.5)
NEUTROPHILS ABSOLUTE: 5 K/UL (ref 1.4–6.5)
NEUTROPHILS ABSOLUTE: 5.1 K/UL (ref 1.4–6.5)
NEUTROPHILS ABSOLUTE: 6.2 K/UL (ref 1.4–6.5)
NEUTROPHILS ABSOLUTE: 7.3 K/UL (ref 1.4–6.5)
NEUTROPHILS ABSOLUTE: 9.1 K/UL (ref 1.4–6.5)
NEUTROPHILS ABSOLUTE: 9.4 K/UL (ref 1.4–6.5)
NEUTROPHILS RELATIVE PERCENT: 70 %
NEUTROPHILS RELATIVE PERCENT: 73.7 %
NEUTROPHILS RELATIVE PERCENT: 83.9 %
NEUTROPHILS RELATIVE PERCENT: 85.7 %
NEUTROPHILS RELATIVE PERCENT: 86.7 %
NEUTROPHILS RELATIVE PERCENT: 90.4 %
NEUTROPHILS RELATIVE PERCENT: 92.7 %
NITRITE, URINE: NEGATIVE
NITRITE, URINE: NEGATIVE
NUCLEATED RED BLOOD CELLS: 1 /100 WBC
O2 SAT, ARTERIAL: 88 % (ref 93–100)
O2 SAT, ARTERIAL: 89 % (ref 93–100)
O2 SAT, ARTERIAL: 89 % (ref 93–100)
O2 SAT, ARTERIAL: 90 % (ref 93–100)
O2 SAT, ARTERIAL: 93 % (ref 93–100)
O2 SAT, ARTERIAL: 95 % (ref 93–100)
O2 SAT, ARTERIAL: 95 % (ref 93–100)
O2 SAT, ARTERIAL: 97 % (ref 93–100)
O2 SAT, ARTERIAL: 99 % (ref 93–100)
O2 SAT, ARTERIAL: 99 % (ref 93–100)
OPIATE SCREEN URINE: NORMAL
ORGANISM: ABNORMAL
PCO2 ARTERIAL: 33 MM HG (ref 35–45)
PCO2 ARTERIAL: 34 MM HG (ref 35–45)
PCO2 ARTERIAL: 36 MM HG (ref 35–45)
PCO2 ARTERIAL: 43 MM HG (ref 35–45)
PCO2 ARTERIAL: 44 MM HG (ref 35–45)
PCO2 ARTERIAL: 51 MM HG (ref 35–45)
PCO2 ARTERIAL: 58 MM HG (ref 35–45)
PCO2 ARTERIAL: 60 MM HG (ref 35–45)
PCO2 ARTERIAL: 60 MM HG (ref 35–45)
PCO2 ARTERIAL: 89 MM HG (ref 35–45)
PDW BLD-RTO: 13.6 % (ref 11.5–14.5)
PDW BLD-RTO: 13.7 % (ref 11.5–14.5)
PDW BLD-RTO: 13.9 % (ref 11.5–14.5)
PDW BLD-RTO: 15.5 % (ref 11.5–14.5)
PDW BLD-RTO: 16.6 % (ref 11.5–14.5)
PDW BLD-RTO: 16.7 % (ref 11.5–14.5)
PDW BLD-RTO: 16.8 % (ref 11.5–14.5)
PDW BLD-RTO: 17.3 % (ref 11.5–14.5)
PDW BLD-RTO: 17.4 % (ref 11.5–14.5)
PDW BLD-RTO: 17.5 % (ref 11.5–14.5)
PDW BLD-RTO: 17.6 % (ref 11.5–14.5)
PDW BLD-RTO: 17.9 % (ref 11.5–14.5)
PDW BLD-RTO: 17.9 % (ref 11.5–14.5)
PDW BLD-RTO: 18 % (ref 11.5–14.5)
PDW BLD-RTO: 18.1 % (ref 11.5–14.5)
PDW BLD-RTO: 18.4 % (ref 11.5–14.5)
PDW BLD-RTO: 18.4 % (ref 11.5–14.5)
PDW BLD-RTO: 18.5 % (ref 11.5–14.5)
PDW BLD-RTO: 18.5 % (ref 11.5–14.5)
PDW BLD-RTO: 18.6 % (ref 11.5–14.5)
PDW BLD-RTO: 18.7 % (ref 11.5–14.5)
PERFORMED ON: ABNORMAL
PERFORMED ON: NORMAL
PH ARTERIAL: 7.19 (ref 7.35–7.45)
PH ARTERIAL: 7.33 (ref 7.35–7.45)
PH ARTERIAL: 7.35 (ref 7.35–7.45)
PH ARTERIAL: 7.35 (ref 7.35–7.45)
PH ARTERIAL: 7.41 (ref 7.35–7.45)
PH ARTERIAL: 7.45 (ref 7.35–7.45)
PH ARTERIAL: 7.46 (ref 7.35–7.45)
PH ARTERIAL: 7.46 (ref 7.35–7.45)
PH ARTERIAL: 7.47 (ref 7.35–7.45)
PH ARTERIAL: 7.5 (ref 7.35–7.45)
PH UA: 5 (ref 5–9)
PH UA: 5.5 (ref 5–9)
PHENCYCLIDINE SCREEN URINE: NORMAL
PHOSPHORUS: 3.1 MG/DL (ref 2.5–4.5)
PLATELET # BLD: 103 K/UL (ref 130–400)
PLATELET # BLD: 106 K/UL (ref 130–400)
PLATELET # BLD: 108 K/UL (ref 130–400)
PLATELET # BLD: 121 K/UL (ref 130–400)
PLATELET # BLD: 130 K/UL (ref 130–400)
PLATELET # BLD: 131 K/UL (ref 130–400)
PLATELET # BLD: 132 K/UL (ref 130–400)
PLATELET # BLD: 138 K/UL (ref 130–400)
PLATELET # BLD: 181 K/UL (ref 130–400)
PLATELET # BLD: 44 K/UL (ref 130–400)
PLATELET # BLD: 44 K/UL (ref 130–400)
PLATELET # BLD: 49 K/UL (ref 130–400)
PLATELET # BLD: 54 K/UL (ref 130–400)
PLATELET # BLD: 55 K/UL (ref 130–400)
PLATELET # BLD: 59 K/UL (ref 130–400)
PLATELET # BLD: 63 K/UL (ref 130–400)
PLATELET # BLD: 70 K/UL (ref 130–400)
PLATELET # BLD: 71 K/UL (ref 130–400)
PLATELET # BLD: 75 K/UL (ref 130–400)
PLATELET # BLD: 80 K/UL (ref 130–400)
PLATELET # BLD: 81 K/UL (ref 130–400)
PLATELET # BLD: 82 K/UL (ref 130–400)
PLATELET # BLD: 88 K/UL (ref 130–400)
PLATELET SLIDE REVIEW: ABNORMAL
PO2 ARTERIAL: 103 MM HG (ref 75–108)
PO2 ARTERIAL: 161 MM HG (ref 75–108)
PO2 ARTERIAL: 172 MM HG (ref 75–108)
PO2 ARTERIAL: 52 MM HG (ref 75–108)
PO2 ARTERIAL: 52 MM HG (ref 75–108)
PO2 ARTERIAL: 53 MM HG (ref 75–108)
PO2 ARTERIAL: 69 MM HG (ref 75–108)
PO2 ARTERIAL: 72 MM HG (ref 75–108)
PO2 ARTERIAL: 73 MM HG (ref 75–108)
PO2 ARTERIAL: 76 MM HG (ref 75–108)
POC CHLORIDE: 108 MEQ/L (ref 99–110)
POC CHLORIDE: 112 MEQ/L (ref 99–110)
POC CHLORIDE: 112 MEQ/L (ref 99–110)
POC CHLORIDE: 113 MEQ/L (ref 99–110)
POC CHLORIDE: 114 MEQ/L (ref 99–110)
POC CHLORIDE: 114 MEQ/L (ref 99–110)
POC CREATININE: 1.4 MG/DL (ref 0.8–1.3)
POC CREATININE: 1.5 MG/DL (ref 0.8–1.3)
POC CREATININE: 1.7 MG/DL (ref 0.8–1.3)
POC CREATININE: 1.8 MG/DL (ref 0.8–1.3)
POC FIO2: 100
POC FIO2: 100
POC FIO2: 3.5
POC FIO2: 40
POC FIO2: 50
POC FIO2: 55
POC FIO2: 60
POC FIO2: 60
POC FIO2: 65
POC FIO2: 80
POC HEMATOCRIT: 22 % (ref 41–53)
POC HEMATOCRIT: 24 % (ref 41–53)
POC HEMATOCRIT: 25 % (ref 41–53)
POC HEMATOCRIT: 27 % (ref 41–53)
POC HEMATOCRIT: 28 % (ref 41–53)
POC HEMATOCRIT: 39 % (ref 41–53)
POC POTASSIUM: 2.9 MEQ/L (ref 3.5–5.1)
POC POTASSIUM: 3.4 MEQ/L (ref 3.5–5.1)
POC POTASSIUM: 3.5 MEQ/L (ref 3.5–5.1)
POC POTASSIUM: 3.5 MEQ/L (ref 3.5–5.1)
POC POTASSIUM: 3.7 MEQ/L (ref 3.5–5.1)
POC POTASSIUM: 3.9 MEQ/L (ref 3.5–5.1)
POC SAMPLE TYPE: ABNORMAL
POC SODIUM: 145 MEQ/L (ref 136–145)
POC SODIUM: 149 MEQ/L (ref 136–145)
POC SODIUM: 153 MEQ/L (ref 136–145)
POC SODIUM: 154 MEQ/L (ref 136–145)
POC SODIUM: 155 MEQ/L (ref 136–145)
POC SODIUM: 155 MEQ/L (ref 136–145)
POIKILOCYTES: ABNORMAL
POLYCHROMASIA: ABNORMAL
POTASSIUM REFLEX MAGNESIUM: 3.5 MEQ/L (ref 3.5–5.1)
POTASSIUM REFLEX MAGNESIUM: 3.8 MEQ/L (ref 3.5–5.1)
POTASSIUM REFLEX MAGNESIUM: 3.9 MEQ/L (ref 3.5–5.1)
POTASSIUM REFLEX MAGNESIUM: 3.9 MEQ/L (ref 3.5–5.1)
POTASSIUM REFLEX MAGNESIUM: 4.2 MEQ/L (ref 3.5–5.1)
POTASSIUM SERPL-SCNC: 3 MEQ/L (ref 3.5–5.1)
POTASSIUM SERPL-SCNC: 3.1 MEQ/L (ref 3.5–5.1)
POTASSIUM SERPL-SCNC: 3.3 MEQ/L (ref 3.5–5.1)
POTASSIUM SERPL-SCNC: 3.5 MEQ/L (ref 3.5–5.1)
POTASSIUM SERPL-SCNC: 3.6 MEQ/L (ref 3.5–5.1)
POTASSIUM SERPL-SCNC: 3.6 MEQ/L (ref 3.5–5.1)
POTASSIUM SERPL-SCNC: 3.7 MEQ/L (ref 3.5–5.1)
POTASSIUM SERPL-SCNC: 3.8 MEQ/L (ref 3.5–5.1)
POTASSIUM SERPL-SCNC: 3.9 MEQ/L (ref 3.5–5.1)
POTASSIUM SERPL-SCNC: 3.9 MEQ/L (ref 3.5–5.1)
POTASSIUM SERPL-SCNC: 4 MEQ/L (ref 3.5–5.1)
POTASSIUM SERPL-SCNC: 4 MEQ/L (ref 3.5–5.1)
POTASSIUM SERPL-SCNC: 4.1 MEQ/L (ref 3.5–5.1)
POTASSIUM SERPL-SCNC: 4.2 MEQ/L (ref 3.5–5.1)
POTASSIUM SERPL-SCNC: 4.7 MEQ/L (ref 3.5–5.1)
POTASSIUM SERPL-SCNC: 5 MEQ/L (ref 3.5–5.1)
POTASSIUM SERPL-SCNC: 5.1 MEQ/L (ref 3.5–5.1)
POTASSIUM SERPL-SCNC: 5.3 MEQ/L (ref 3.5–5.1)
PRO-BNP: NORMAL PG/ML
PROCALCITONIN: 0.15 NG/ML (ref 0–0.15)
PROCALCITONIN: 0.37 NG/ML (ref 0–0.15)
PROCALCITONIN: 0.44 NG/ML (ref 0–0.15)
PROTEIN UA: 100 MG/DL
PROTEIN UA: 30 MG/DL
PROTHROMBIN TIME: 16.3 SEC (ref 9.6–12.3)
RBC # BLD: 1.42 M/UL (ref 4.7–6.1)
RBC # BLD: 2.15 M/UL (ref 4.7–6.1)
RBC # BLD: 2.18 M/UL (ref 4.7–6.1)
RBC # BLD: 2.22 M/UL (ref 4.7–6.1)
RBC # BLD: 2.35 M/UL (ref 4.7–6.1)
RBC # BLD: 2.36 M/UL (ref 4.7–6.1)
RBC # BLD: 2.39 M/UL (ref 4.7–6.1)
RBC # BLD: 2.44 M/UL (ref 4.7–6.1)
RBC # BLD: 2.5 M/UL (ref 4.7–6.1)
RBC # BLD: 2.52 M/UL (ref 4.7–6.1)
RBC # BLD: 2.52 M/UL (ref 4.7–6.1)
RBC # BLD: 2.55 M/UL (ref 4.7–6.1)
RBC # BLD: 2.56 M/UL (ref 4.7–6.1)
RBC # BLD: 2.61 M/UL (ref 4.7–6.1)
RBC # BLD: 2.69 M/UL (ref 4.7–6.1)
RBC # BLD: 2.71 M/UL (ref 4.7–6.1)
RBC # BLD: 2.75 M/UL (ref 4.7–6.1)
RBC # BLD: 2.79 M/UL (ref 4.7–6.1)
RBC # BLD: 2.88 M/UL (ref 4.7–6.1)
RBC # BLD: 2.93 M/UL (ref 4.7–6.1)
RBC # BLD: 3.7 M/UL (ref 4.7–6.1)
RBC # BLD: 3.75 M/UL (ref 4.7–6.1)
RBC # BLD: 3.79 M/UL (ref 4.7–6.1)
RBC UA: ABNORMAL /HPF (ref 0–2)
RBC UA: ABNORMAL /HPF (ref 0–2)
RENAL EPITHELIAL, UA: ABNORMAL /HPF
RENAL EPITHELIAL, UA: ABNORMAL /HPF
SLIDE REVIEW: ABNORMAL
SLIDE REVIEW: ABNORMAL
SMUDGE CELLS: 3.8
SODIUM BLD-SCNC: 126 MEQ/L (ref 132–144)
SODIUM BLD-SCNC: 139 MEQ/L (ref 132–144)
SODIUM BLD-SCNC: 139 MEQ/L (ref 132–144)
SODIUM BLD-SCNC: 140 MEQ/L (ref 132–144)
SODIUM BLD-SCNC: 141 MEQ/L (ref 132–144)
SODIUM BLD-SCNC: 142 MEQ/L (ref 132–144)
SODIUM BLD-SCNC: 143 MEQ/L (ref 132–144)
SODIUM BLD-SCNC: 144 MEQ/L (ref 132–144)
SODIUM BLD-SCNC: 145 MEQ/L (ref 132–144)
SODIUM BLD-SCNC: 146 MEQ/L (ref 132–144)
SODIUM BLD-SCNC: 148 MEQ/L (ref 132–144)
SODIUM BLD-SCNC: 151 MEQ/L (ref 132–144)
SODIUM BLD-SCNC: 152 MEQ/L (ref 132–144)
SODIUM BLD-SCNC: 152 MEQ/L (ref 132–144)
SODIUM BLD-SCNC: 154 MEQ/L (ref 132–144)
SODIUM BLD-SCNC: 155 MEQ/L (ref 132–144)
SODIUM BLD-SCNC: 155 MEQ/L (ref 132–144)
SODIUM BLD-SCNC: 156 MEQ/L (ref 132–144)
SODIUM BLD-SCNC: 157 MEQ/L (ref 132–144)
SODIUM BLD-SCNC: 159 MEQ/L (ref 132–144)
SPECIFIC GRAVITY UA: 1.02 (ref 1–1.03)
SPECIFIC GRAVITY UA: 1.02 (ref 1–1.03)
T4 FREE: 1.29 NG/DL (ref 0.93–1.7)
TCO2 ARTERIAL: 25 (ref 22–29)
TCO2 ARTERIAL: 25 (ref 22–29)
TCO2 ARTERIAL: 29 (ref 22–29)
TCO2 ARTERIAL: 32 (ref 22–29)
TCO2 ARTERIAL: 33 (ref 22–29)
TCO2 ARTERIAL: 34 (ref 22–29)
TCO2 ARTERIAL: 35 (ref 22–29)
TCO2 ARTERIAL: 37 (ref 22–29)
TOTAL CK: 1527 U/L (ref 0–190)
TOTAL CK: 3198 U/L (ref 0–190)
TOTAL CK: 5942 U/L (ref 0–190)
TOTAL CK: 804 U/L (ref 0–190)
TOTAL CK: 8752 U/L (ref 0–190)
TOTAL CK: 9172 U/L (ref 0–190)
TOTAL PROTEIN: 4.9 G/DL (ref 6.4–8.1)
TOTAL PROTEIN: 5.2 G/DL (ref 6.4–8.1)
TOTAL PROTEIN: 5.4 G/DL (ref 6.4–8.1)
TOTAL PROTEIN: 5.5 G/DL (ref 6.4–8.1)
TOTAL PROTEIN: 5.6 G/DL (ref 6.4–8.1)
TROPONIN: 0.03 NG/ML (ref 0–0.01)
TROPONIN: 0.04 NG/ML (ref 0–0.01)
TROPONIN: 0.04 NG/ML (ref 0–0.01)
TSH REFLEX: 7.56 UIU/ML (ref 0.27–4.2)
TSH SERPL DL<=0.05 MIU/L-ACNC: 4.32 UIU/ML (ref 0.27–4.2)
URINE CULTURE, ROUTINE: ABNORMAL
URINE CULTURE, ROUTINE: ABNORMAL
URINE CULTURE, ROUTINE: NORMAL
URINE REFLEX TO CULTURE: YES
UROBILINOGEN, URINE: 0.2 E.U./DL
UROBILINOGEN, URINE: 1 E.U./DL
VANCOMYCIN TROUGH: 14.1 UG/ML (ref 10–20)
WBC # BLD: 10.2 K/UL (ref 4.8–10.8)
WBC # BLD: 10.5 K/UL (ref 4.8–10.8)
WBC # BLD: 10.8 K/UL (ref 4.8–10.8)
WBC # BLD: 11 K/UL (ref 4.8–10.8)
WBC # BLD: 11 K/UL (ref 4.8–10.8)
WBC # BLD: 11.2 K/UL (ref 4.8–10.8)
WBC # BLD: 11.2 K/UL (ref 4.8–10.8)
WBC # BLD: 11.4 K/UL (ref 4.8–10.8)
WBC # BLD: 12.2 K/UL (ref 4.8–10.8)
WBC # BLD: 12.4 K/UL (ref 4.8–10.8)
WBC # BLD: 12.6 K/UL (ref 4.8–10.8)
WBC # BLD: 13.3 K/UL (ref 4.8–10.8)
WBC # BLD: 14 K/UL (ref 4.8–10.8)
WBC # BLD: 14.3 K/UL (ref 4.8–10.8)
WBC # BLD: 14.4 K/UL (ref 4.8–10.8)
WBC # BLD: 14.8 K/UL (ref 4.8–10.8)
WBC # BLD: 5 K/UL (ref 4.8–10.8)
WBC # BLD: 5.4 K/UL (ref 4.8–10.8)
WBC # BLD: 5.8 K/UL (ref 4.8–10.8)
WBC # BLD: 6.8 K/UL (ref 4.8–10.8)
WBC # BLD: 7.1 K/UL (ref 4.8–10.8)
WBC # BLD: 7.4 K/UL (ref 4.8–10.8)
WBC # BLD: 8.6 K/UL (ref 4.8–10.8)
WBC UA: ABNORMAL /HPF (ref 0–5)
WBC UA: ABNORMAL /HPF (ref 0–5)

## 2018-01-01 PROCEDURE — 6360000002 HC RX W HCPCS: Performed by: INTERNAL MEDICINE

## 2018-01-01 PROCEDURE — 99232 SBSQ HOSP IP/OBS MODERATE 35: CPT | Performed by: INTERNAL MEDICINE

## 2018-01-01 PROCEDURE — 85027 COMPLETE CBC AUTOMATED: CPT

## 2018-01-01 PROCEDURE — 3700000000 HC ANESTHESIA ATTENDED CARE: Performed by: INTERNAL MEDICINE

## 2018-01-01 PROCEDURE — 77001 FLUOROGUIDE FOR VEIN DEVICE: CPT | Performed by: RADIOLOGY

## 2018-01-01 PROCEDURE — 2580000003 HC RX 258: Performed by: PHYSICIAN ASSISTANT

## 2018-01-01 PROCEDURE — 2580000003 HC RX 258: Performed by: INTERNAL MEDICINE

## 2018-01-01 PROCEDURE — 6370000000 HC RX 637 (ALT 250 FOR IP): Performed by: NURSE PRACTITIONER

## 2018-01-01 PROCEDURE — 6370000000 HC RX 637 (ALT 250 FOR IP): Performed by: INTERNAL MEDICINE

## 2018-01-01 PROCEDURE — 71275 CT ANGIOGRAPHY CHEST: CPT

## 2018-01-01 PROCEDURE — 99291 CRITICAL CARE FIRST HOUR: CPT | Performed by: INTERNAL MEDICINE

## 2018-01-01 PROCEDURE — 36430 TRANSFUSION BLD/BLD COMPNT: CPT

## 2018-01-01 PROCEDURE — 36415 COLL VENOUS BLD VENIPUNCTURE: CPT

## 2018-01-01 PROCEDURE — 85014 HEMATOCRIT: CPT

## 2018-01-01 PROCEDURE — C9113 INJ PANTOPRAZOLE SODIUM, VIA: HCPCS | Performed by: INTERNAL MEDICINE

## 2018-01-01 PROCEDURE — 84439 ASSAY OF FREE THYROXINE: CPT

## 2018-01-01 PROCEDURE — 92610 EVALUATE SWALLOWING FUNCTION: CPT

## 2018-01-01 PROCEDURE — 86923 COMPATIBILITY TEST ELECTRIC: CPT

## 2018-01-01 PROCEDURE — 97112 NEUROMUSCULAR REEDUCATION: CPT

## 2018-01-01 PROCEDURE — 80076 HEPATIC FUNCTION PANEL: CPT

## 2018-01-01 PROCEDURE — 2000000000 HC ICU R&B

## 2018-01-01 PROCEDURE — 99233 SBSQ HOSP IP/OBS HIGH 50: CPT | Performed by: INTERNAL MEDICINE

## 2018-01-01 PROCEDURE — 80053 COMPREHEN METABOLIC PANEL: CPT

## 2018-01-01 PROCEDURE — 2500000003 HC RX 250 WO HCPCS: Performed by: INTERNAL MEDICINE

## 2018-01-01 PROCEDURE — 2700000000 HC OXYGEN THERAPY PER DAY

## 2018-01-01 PROCEDURE — 88305 TISSUE EXAM BY PATHOLOGIST: CPT

## 2018-01-01 PROCEDURE — 83605 ASSAY OF LACTIC ACID: CPT

## 2018-01-01 PROCEDURE — 93010 ELECTROCARDIOGRAM REPORT: CPT | Performed by: INTERNAL MEDICINE

## 2018-01-01 PROCEDURE — 94640 AIRWAY INHALATION TREATMENT: CPT

## 2018-01-01 PROCEDURE — 82550 ASSAY OF CK (CPK): CPT

## 2018-01-01 PROCEDURE — 82435 ASSAY OF BLOOD CHLORIDE: CPT

## 2018-01-01 PROCEDURE — 99223 1ST HOSP IP/OBS HIGH 75: CPT | Performed by: INTERNAL MEDICINE

## 2018-01-01 PROCEDURE — APPNB30 APP NON BILLABLE TIME 0-30 MINS: Performed by: NURSE PRACTITIONER

## 2018-01-01 PROCEDURE — 2580000003 HC RX 258: Performed by: NURSE ANESTHETIST, CERTIFIED REGISTERED

## 2018-01-01 PROCEDURE — 82565 ASSAY OF CREATININE: CPT

## 2018-01-01 PROCEDURE — P9016 RBC LEUKOCYTES REDUCED: HCPCS

## 2018-01-01 PROCEDURE — 80048 BASIC METABOLIC PNL TOTAL CA: CPT

## 2018-01-01 PROCEDURE — 2709999900 IR PICC WO SQ PORT/PUMP > 5 YEARS

## 2018-01-01 PROCEDURE — 94003 VENT MGMT INPAT SUBQ DAY: CPT

## 2018-01-01 PROCEDURE — 85025 COMPLETE CBC W/AUTO DIFF WBC: CPT

## 2018-01-01 PROCEDURE — 94664 DEMO&/EVAL PT USE INHALER: CPT

## 2018-01-01 PROCEDURE — 82330 ASSAY OF CALCIUM: CPT

## 2018-01-01 PROCEDURE — 84132 ASSAY OF SERUM POTASSIUM: CPT

## 2018-01-01 PROCEDURE — 97162 PT EVAL MOD COMPLEX 30 MIN: CPT

## 2018-01-01 PROCEDURE — 87077 CULTURE AEROBIC IDENTIFY: CPT

## 2018-01-01 PROCEDURE — 71045 X-RAY EXAM CHEST 1 VIEW: CPT

## 2018-01-01 PROCEDURE — 94760 N-INVAS EAR/PLS OXIMETRY 1: CPT

## 2018-01-01 PROCEDURE — 86901 BLOOD TYPING SEROLOGIC RH(D): CPT

## 2018-01-01 PROCEDURE — 94660 CPAP INITIATION&MGMT: CPT

## 2018-01-01 PROCEDURE — 85018 HEMOGLOBIN: CPT

## 2018-01-01 PROCEDURE — 92526 ORAL FUNCTION THERAPY: CPT

## 2018-01-01 PROCEDURE — 93306 TTE W/DOPPLER COMPLETE: CPT

## 2018-01-01 PROCEDURE — 82553 CREATINE MB FRACTION: CPT

## 2018-01-01 PROCEDURE — 6370000000 HC RX 637 (ALT 250 FOR IP): Performed by: PHYSICIAN ASSISTANT

## 2018-01-01 PROCEDURE — 6360000002 HC RX W HCPCS: Performed by: NURSE PRACTITIONER

## 2018-01-01 PROCEDURE — 70450 CT HEAD/BRAIN W/O DYE: CPT

## 2018-01-01 PROCEDURE — 36600 WITHDRAWAL OF ARTERIAL BLOOD: CPT

## 2018-01-01 PROCEDURE — APPNB15 APP NON BILLABLE TIME 0-15 MINS: Performed by: NURSE PRACTITIONER

## 2018-01-01 PROCEDURE — 6360000004 HC RX CONTRAST MEDICATION: Performed by: INTERNAL MEDICINE

## 2018-01-01 PROCEDURE — 2580000003 HC RX 258

## 2018-01-01 PROCEDURE — 51702 INSERT TEMP BLADDER CATH: CPT

## 2018-01-01 PROCEDURE — G8996 SWALLOW CURRENT STATUS: HCPCS

## 2018-01-01 PROCEDURE — G8978 MOBILITY CURRENT STATUS: HCPCS

## 2018-01-01 PROCEDURE — 36592 COLLECT BLOOD FROM PICC: CPT

## 2018-01-01 PROCEDURE — 2500000003 HC RX 250 WO HCPCS: Performed by: NURSE PRACTITIONER

## 2018-01-01 PROCEDURE — 7100000000 HC PACU RECOVERY - FIRST 15 MIN: Performed by: INTERNAL MEDICINE

## 2018-01-01 PROCEDURE — G8987 SELF CARE CURRENT STATUS: HCPCS

## 2018-01-01 PROCEDURE — 2580000003 HC RX 258: Performed by: NURSE PRACTITIONER

## 2018-01-01 PROCEDURE — 83880 ASSAY OF NATRIURETIC PEPTIDE: CPT

## 2018-01-01 PROCEDURE — 94002 VENT MGMT INPAT INIT DAY: CPT

## 2018-01-01 PROCEDURE — 7100000001 HC PACU RECOVERY - ADDTL 15 MIN: Performed by: INTERNAL MEDICINE

## 2018-01-01 PROCEDURE — 87086 URINE CULTURE/COLONY COUNT: CPT

## 2018-01-01 PROCEDURE — 94761 N-INVAS EAR/PLS OXIMETRY MLT: CPT

## 2018-01-01 PROCEDURE — 97110 THERAPEUTIC EXERCISES: CPT

## 2018-01-01 PROCEDURE — 82803 BLOOD GASES ANY COMBINATION: CPT

## 2018-01-01 PROCEDURE — 02HV33Z INSERTION OF INFUSION DEVICE INTO SUPERIOR VENA CAVA, PERCUTANEOUS APPROACH: ICD-10-PCS | Performed by: RADIOLOGY

## 2018-01-01 PROCEDURE — 84145 PROCALCITONIN (PCT): CPT

## 2018-01-01 PROCEDURE — 83735 ASSAY OF MAGNESIUM: CPT

## 2018-01-01 PROCEDURE — G8988 SELF CARE GOAL STATUS: HCPCS

## 2018-01-01 PROCEDURE — 93005 ELECTROCARDIOGRAM TRACING: CPT

## 2018-01-01 PROCEDURE — 3609027000 HC COLONOSCOPY: Performed by: INTERNAL MEDICINE

## 2018-01-01 PROCEDURE — 82948 REAGENT STRIP/BLOOD GLUCOSE: CPT

## 2018-01-01 PROCEDURE — 97535 SELF CARE MNGMENT TRAINING: CPT

## 2018-01-01 PROCEDURE — 72125 CT NECK SPINE W/O DYE: CPT

## 2018-01-01 PROCEDURE — 74176 CT ABD & PELVIS W/O CONTRAST: CPT

## 2018-01-01 PROCEDURE — 87040 BLOOD CULTURE FOR BACTERIA: CPT

## 2018-01-01 PROCEDURE — G0378 HOSPITAL OBSERVATION PER HR: HCPCS

## 2018-01-01 PROCEDURE — P9046 ALBUMIN (HUMAN), 25%, 20 ML: HCPCS | Performed by: INTERNAL MEDICINE

## 2018-01-01 PROCEDURE — 2500000003 HC RX 250 WO HCPCS: Performed by: NURSE ANESTHETIST, CERTIFIED REGISTERED

## 2018-01-01 PROCEDURE — 45385 COLONOSCOPY W/LESION REMOVAL: CPT | Performed by: INTERNAL MEDICINE

## 2018-01-01 PROCEDURE — 82533 TOTAL CORTISOL: CPT

## 2018-01-01 PROCEDURE — 84295 ASSAY OF SERUM SODIUM: CPT

## 2018-01-01 PROCEDURE — 0DJ08ZZ INSPECTION OF UPPER INTESTINAL TRACT, VIA NATURAL OR ARTIFICIAL OPENING ENDOSCOPIC: ICD-10-PCS | Performed by: INTERNAL MEDICINE

## 2018-01-01 PROCEDURE — 80307 DRUG TEST PRSMV CHEM ANLYZR: CPT

## 2018-01-01 PROCEDURE — 86900 BLOOD TYPING SEROLOGIC ABO: CPT

## 2018-01-01 PROCEDURE — 97140 MANUAL THERAPY 1/> REGIONS: CPT

## 2018-01-01 PROCEDURE — 84443 ASSAY THYROID STIM HORMONE: CPT

## 2018-01-01 PROCEDURE — P9017 PLASMA 1 DONOR FRZ W/IN 8 HR: HCPCS

## 2018-01-01 PROCEDURE — 99222 1ST HOSP IP/OBS MODERATE 55: CPT | Performed by: INTERNAL MEDICINE

## 2018-01-01 PROCEDURE — 45378 DIAGNOSTIC COLONOSCOPY: CPT | Performed by: INTERNAL MEDICINE

## 2018-01-01 PROCEDURE — 30283B1 TRANSFUSION OF NONAUTOLOGOUS 4-FACTOR PROTHROMBIN COMPLEX CONCENTRATE INTO VEIN, PERCUTANEOUS APPROACH: ICD-10-PCS | Performed by: EMERGENCY MEDICINE

## 2018-01-01 PROCEDURE — 2580000003 HC RX 258: Performed by: HOSPITALIST

## 2018-01-01 PROCEDURE — 6360000002 HC RX W HCPCS: Performed by: NURSE ANESTHETIST, CERTIFIED REGISTERED

## 2018-01-01 PROCEDURE — 80162 ASSAY OF DIGOXIN TOTAL: CPT

## 2018-01-01 PROCEDURE — 97167 OT EVAL HIGH COMPLEX 60 MIN: CPT

## 2018-01-01 PROCEDURE — 93880 EXTRACRANIAL BILAT STUDY: CPT

## 2018-01-01 PROCEDURE — C9132 KCENTRA, PER I.U.: HCPCS | Performed by: INTERNAL MEDICINE

## 2018-01-01 PROCEDURE — 86850 RBC ANTIBODY SCREEN: CPT

## 2018-01-01 PROCEDURE — 2060000000 HC ICU INTERMEDIATE R&B

## 2018-01-01 PROCEDURE — 6360000002 HC RX W HCPCS

## 2018-01-01 PROCEDURE — 6370000000 HC RX 637 (ALT 250 FOR IP): Performed by: HOSPITALIST

## 2018-01-01 PROCEDURE — 99213 OFFICE O/P EST LOW 20 MIN: CPT

## 2018-01-01 PROCEDURE — 3609017100 HC EGD: Performed by: INTERNAL MEDICINE

## 2018-01-01 PROCEDURE — 81001 URINALYSIS AUTO W/SCOPE: CPT

## 2018-01-01 PROCEDURE — C9113 INJ PANTOPRAZOLE SODIUM, VIA: HCPCS | Performed by: NURSE PRACTITIONER

## 2018-01-01 PROCEDURE — 84484 ASSAY OF TROPONIN QUANT: CPT

## 2018-01-01 PROCEDURE — 93279 PRGRMG DEV EVAL PM/LDLS PM: CPT

## 2018-01-01 PROCEDURE — 95816 EEG AWAKE AND DROWSY: CPT

## 2018-01-01 PROCEDURE — 36569 INSJ PICC 5 YR+ W/O IMAGING: CPT | Performed by: RADIOLOGY

## 2018-01-01 PROCEDURE — 76937 US GUIDE VASCULAR ACCESS: CPT | Performed by: RADIOLOGY

## 2018-01-01 PROCEDURE — 87186 SC STD MICRODIL/AGAR DIL: CPT

## 2018-01-01 PROCEDURE — 1210000000 HC MED SURG R&B

## 2018-01-01 PROCEDURE — 0DBN8ZX EXCISION OF SIGMOID COLON, VIA NATURAL OR ARTIFICIAL OPENING ENDOSCOPIC, DIAGNOSTIC: ICD-10-PCS | Performed by: INTERNAL MEDICINE

## 2018-01-01 PROCEDURE — 43235 EGD DIAGNOSTIC BRUSH WASH: CPT | Performed by: INTERNAL MEDICINE

## 2018-01-01 PROCEDURE — 2709999900 HC NON-CHARGEABLE SUPPLY: Performed by: INTERNAL MEDICINE

## 2018-01-01 PROCEDURE — 6360000002 HC RX W HCPCS: Performed by: HOSPITALIST

## 2018-01-01 PROCEDURE — 0DJD8ZZ INSPECTION OF LOWER INTESTINAL TRACT, VIA NATURAL OR ARTIFICIAL OPENING ENDOSCOPIC: ICD-10-PCS | Performed by: INTERNAL MEDICINE

## 2018-01-01 PROCEDURE — 99285 EMERGENCY DEPT VISIT HI MDM: CPT

## 2018-01-01 PROCEDURE — G8997 SWALLOW GOAL STATUS: HCPCS

## 2018-01-01 PROCEDURE — 97163 PT EVAL HIGH COMPLEX 45 MIN: CPT

## 2018-01-01 PROCEDURE — 80202 ASSAY OF VANCOMYCIN: CPT

## 2018-01-01 PROCEDURE — 5A1955Z RESPIRATORY VENTILATION, GREATER THAN 96 CONSECUTIVE HOURS: ICD-10-PCS | Performed by: INTERNAL MEDICINE

## 2018-01-01 PROCEDURE — 72170 X-RAY EXAM OF PELVIS: CPT

## 2018-01-01 PROCEDURE — G8979 MOBILITY GOAL STATUS: HCPCS

## 2018-01-01 PROCEDURE — C1773 RET DEV, INSERTABLE: HCPCS | Performed by: INTERNAL MEDICINE

## 2018-01-01 PROCEDURE — 2500000003 HC RX 250 WO HCPCS

## 2018-01-01 PROCEDURE — 84100 ASSAY OF PHOSPHORUS: CPT

## 2018-01-01 PROCEDURE — 1111F DSCHRG MED/CURRENT MED MERGE: CPT | Performed by: FAMILY MEDICINE

## 2018-01-01 PROCEDURE — 74177 CT ABD & PELVIS W/CONTRAST: CPT

## 2018-01-01 PROCEDURE — 85384 FIBRINOGEN ACTIVITY: CPT

## 2018-01-01 PROCEDURE — 93000 ELECTROCARDIOGRAM COMPLETE: CPT | Performed by: INTERNAL MEDICINE

## 2018-01-01 PROCEDURE — 96372 THER/PROPH/DIAG INJ SC/IM: CPT

## 2018-01-01 PROCEDURE — 3700000001 HC ADD 15 MINUTES (ANESTHESIA): Performed by: INTERNAL MEDICINE

## 2018-01-01 PROCEDURE — APPNB45 APP NON BILLABLE 31-45 MINUTES: Performed by: NURSE PRACTITIONER

## 2018-01-01 PROCEDURE — 85610 PROTHROMBIN TIME: CPT

## 2018-01-01 PROCEDURE — 0BH17EZ INSERTION OF ENDOTRACHEAL AIRWAY INTO TRACHEA, VIA NATURAL OR ARTIFICIAL OPENING: ICD-10-PCS | Performed by: INTERNAL MEDICINE

## 2018-01-01 RX ORDER — ONDANSETRON 2 MG/ML
4 INJECTION INTRAMUSCULAR; INTRAVENOUS
Status: DISCONTINUED | OUTPATIENT
Start: 2018-01-01 | End: 2018-01-01 | Stop reason: HOSPADM

## 2018-01-01 RX ORDER — SODIUM CHLORIDE 9 MG/ML
INJECTION, SOLUTION INTRAVENOUS
Status: COMPLETED
Start: 2018-01-01 | End: 2018-01-01

## 2018-01-01 RX ORDER — ONDANSETRON 2 MG/ML
4 INJECTION INTRAMUSCULAR; INTRAVENOUS EVERY 6 HOURS PRN
Status: DISCONTINUED | OUTPATIENT
Start: 2018-01-01 | End: 2018-01-01

## 2018-01-01 RX ORDER — HEPARIN SODIUM 5000 [USP'U]/ML
5000 INJECTION, SOLUTION INTRAVENOUS; SUBCUTANEOUS EVERY 8 HOURS SCHEDULED
Status: DISCONTINUED | OUTPATIENT
Start: 2018-01-01 | End: 2018-01-01

## 2018-01-01 RX ORDER — MAGNESIUM HYDROXIDE 1200 MG/15ML
LIQUID ORAL PRN
Status: DISCONTINUED | OUTPATIENT
Start: 2018-01-01 | End: 2018-01-01 | Stop reason: HOSPADM

## 2018-01-01 RX ORDER — ALBUMIN (HUMAN) 12.5 G/50ML
25 SOLUTION INTRAVENOUS 2 TIMES DAILY
Status: COMPLETED | OUTPATIENT
Start: 2018-01-01 | End: 2018-01-01

## 2018-01-01 RX ORDER — METOPROLOL TARTRATE 50 MG/1
50 TABLET, FILM COATED ORAL 2 TIMES DAILY
Qty: 60 TABLET | Refills: 0 | Status: SHIPPED | OUTPATIENT
Start: 2018-01-01 | End: 2018-01-01 | Stop reason: SDUPTHER

## 2018-01-01 RX ORDER — SODIUM CHLORIDE 9 MG/ML
INJECTION, SOLUTION INTRAVENOUS CONTINUOUS
Status: DISCONTINUED | OUTPATIENT
Start: 2018-01-01 | End: 2018-01-01

## 2018-01-01 RX ORDER — ALBUMIN (HUMAN) 12.5 G/50ML
25 SOLUTION INTRAVENOUS 2 TIMES DAILY
Status: DISPENSED | OUTPATIENT
Start: 2018-01-01 | End: 2018-01-01

## 2018-01-01 RX ORDER — METOPROLOL TARTRATE 50 MG/1
100 TABLET, FILM COATED ORAL 2 TIMES DAILY
Status: DISCONTINUED | OUTPATIENT
Start: 2018-01-01 | End: 2018-01-01 | Stop reason: HOSPADM

## 2018-01-01 RX ORDER — FENTANYL CITRATE 50 UG/ML
25 INJECTION, SOLUTION INTRAMUSCULAR; INTRAVENOUS
Status: DISCONTINUED | OUTPATIENT
Start: 2018-01-01 | End: 2018-01-01 | Stop reason: HOSPADM

## 2018-01-01 RX ORDER — LEVALBUTEROL INHALATION SOLUTION 1.25 MG/3ML
1.25 SOLUTION RESPIRATORY (INHALATION) EVERY 8 HOURS PRN
Status: DISCONTINUED | OUTPATIENT
Start: 2018-01-01 | End: 2018-01-01

## 2018-01-01 RX ORDER — 0.9 % SODIUM CHLORIDE 0.9 %
250 INTRAVENOUS SOLUTION INTRAVENOUS ONCE
Status: DISCONTINUED | OUTPATIENT
Start: 2018-01-01 | End: 2018-01-01

## 2018-01-01 RX ORDER — ACETAMINOPHEN 650 MG/1
650 SUPPOSITORY RECTAL EVERY 4 HOURS PRN
Status: DISCONTINUED | OUTPATIENT
Start: 2018-01-01 | End: 2018-01-01 | Stop reason: ALTCHOICE

## 2018-01-01 RX ORDER — POTASSIUM CHLORIDE 20MEQ/15ML
40 LIQUID (ML) ORAL ONCE
Status: COMPLETED | OUTPATIENT
Start: 2018-01-01 | End: 2018-01-01

## 2018-01-01 RX ORDER — PREDNISONE 10 MG/1
10 TABLET ORAL 2 TIMES DAILY
Status: DISCONTINUED | OUTPATIENT
Start: 2018-01-01 | End: 2018-01-01

## 2018-01-01 RX ORDER — FLUOCINONIDE 0.5 MG/G
OINTMENT TOPICAL DAILY
Status: DISCONTINUED | OUTPATIENT
Start: 2018-01-01 | End: 2018-01-01 | Stop reason: HOSPADM

## 2018-01-01 RX ORDER — MIDODRINE HYDROCHLORIDE 5 MG/1
10 TABLET ORAL
Status: DISCONTINUED | OUTPATIENT
Start: 2018-01-01 | End: 2018-01-01

## 2018-01-01 RX ORDER — SODIUM CHLORIDE 9 MG/ML
INJECTION, SOLUTION INTRAVENOUS
Status: DISPENSED
Start: 2018-01-01 | End: 2018-01-01

## 2018-01-01 RX ORDER — METOCLOPRAMIDE HYDROCHLORIDE 5 MG/ML
10 INJECTION INTRAMUSCULAR; INTRAVENOUS
Status: DISCONTINUED | OUTPATIENT
Start: 2018-01-01 | End: 2018-01-01 | Stop reason: HOSPADM

## 2018-01-01 RX ORDER — SODIUM CHLORIDE 0.9 % (FLUSH) 0.9 %
10 SYRINGE (ML) INJECTION PRN
Status: DISCONTINUED | OUTPATIENT
Start: 2018-01-01 | End: 2018-01-01 | Stop reason: HOSPADM

## 2018-01-01 RX ORDER — LEVALBUTEROL TARTRATE 45 UG/1
4 AEROSOL, METERED ORAL
Status: DISCONTINUED | OUTPATIENT
Start: 2018-01-01 | End: 2018-01-01 | Stop reason: ALTCHOICE

## 2018-01-01 RX ORDER — SODIUM CHLORIDE 0.9 % (FLUSH) 0.9 %
10 SYRINGE (ML) INJECTION PRN
Status: DISCONTINUED | OUTPATIENT
Start: 2018-01-01 | End: 2018-01-01 | Stop reason: SDUPTHER

## 2018-01-01 RX ORDER — PANTOPRAZOLE SODIUM 40 MG/10ML
80 INJECTION, POWDER, LYOPHILIZED, FOR SOLUTION INTRAVENOUS ONCE
Status: COMPLETED | OUTPATIENT
Start: 2018-01-01 | End: 2018-01-01

## 2018-01-01 RX ORDER — POTASSIUM CHLORIDE 20MEQ/15ML
40 LIQUID (ML) ORAL DAILY
Status: DISCONTINUED | OUTPATIENT
Start: 2018-01-01 | End: 2018-01-01

## 2018-01-01 RX ORDER — POTASSIUM CHLORIDE 7.45 MG/ML
10 INJECTION INTRAVENOUS ONCE
Status: COMPLETED | OUTPATIENT
Start: 2018-01-01 | End: 2018-01-01

## 2018-01-01 RX ORDER — PROPOFOL 10 MG/ML
INJECTION, EMULSION INTRAVENOUS PRN
Status: DISCONTINUED | OUTPATIENT
Start: 2018-01-01 | End: 2018-01-01 | Stop reason: SDUPTHER

## 2018-01-01 RX ORDER — DEXTROSE MONOHYDRATE 50 MG/ML
INJECTION, SOLUTION INTRAVENOUS
Status: COMPLETED
Start: 2018-01-01 | End: 2018-01-01

## 2018-01-01 RX ORDER — DEXTROSE MONOHYDRATE 50 MG/ML
INJECTION, SOLUTION INTRAVENOUS CONTINUOUS
Status: DISCONTINUED | OUTPATIENT
Start: 2018-01-01 | End: 2018-01-01

## 2018-01-01 RX ORDER — AMIODARONE HYDROCHLORIDE 200 MG/1
200 TABLET ORAL EVERY 8 HOURS
Status: DISCONTINUED | OUTPATIENT
Start: 2018-01-01 | End: 2018-01-01 | Stop reason: HOSPADM

## 2018-01-01 RX ORDER — LEVALBUTEROL 1.25 MG/.5ML
1.25 SOLUTION, CONCENTRATE RESPIRATORY (INHALATION)
Status: DISCONTINUED | OUTPATIENT
Start: 2018-01-01 | End: 2018-01-01

## 2018-01-01 RX ORDER — PREDNISONE 10 MG/1
10 TABLET ORAL DAILY
Status: DISCONTINUED | OUTPATIENT
Start: 2018-11-06 | End: 2018-01-01 | Stop reason: ALTCHOICE

## 2018-01-01 RX ORDER — DOCUSATE SODIUM 100 MG/1
100 CAPSULE, LIQUID FILLED ORAL 2 TIMES DAILY
Status: DISCONTINUED | OUTPATIENT
Start: 2018-01-01 | End: 2018-01-01

## 2018-01-01 RX ORDER — ETOMIDATE 2 MG/ML
INJECTION INTRAVENOUS
Status: COMPLETED
Start: 2018-01-01 | End: 2018-01-01

## 2018-01-01 RX ORDER — LEVALBUTEROL 1.25 MG/.5ML
1.25 SOLUTION, CONCENTRATE RESPIRATORY (INHALATION) 3 TIMES DAILY
Status: DISCONTINUED | OUTPATIENT
Start: 2018-01-01 | End: 2018-01-01

## 2018-01-01 RX ORDER — PANTOPRAZOLE SODIUM 40 MG/1
40 TABLET, DELAYED RELEASE ORAL
Status: DISCONTINUED | OUTPATIENT
Start: 2018-01-01 | End: 2018-01-01

## 2018-01-01 RX ORDER — ONDANSETRON 2 MG/ML
4 INJECTION INTRAMUSCULAR; INTRAVENOUS
Status: ACTIVE | OUTPATIENT
Start: 2018-01-01 | End: 2018-01-01

## 2018-01-01 RX ORDER — PANTOPRAZOLE SODIUM 40 MG/10ML
40 INJECTION, POWDER, LYOPHILIZED, FOR SOLUTION INTRAVENOUS 2 TIMES DAILY
Status: DISCONTINUED | OUTPATIENT
Start: 2018-01-01 | End: 2018-01-01

## 2018-01-01 RX ORDER — SODIUM CHLORIDE 9 MG/ML
250 INJECTION, SOLUTION INTRAVENOUS ONCE
Status: COMPLETED | OUTPATIENT
Start: 2018-01-01 | End: 2018-01-01

## 2018-01-01 RX ORDER — METHYLPREDNISOLONE SODIUM SUCCINATE 40 MG/ML
40 INJECTION, POWDER, LYOPHILIZED, FOR SOLUTION INTRAMUSCULAR; INTRAVENOUS EVERY 8 HOURS
Status: DISCONTINUED | OUTPATIENT
Start: 2018-01-01 | End: 2018-01-01

## 2018-01-01 RX ORDER — FUROSEMIDE 10 MG/ML
20 INJECTION INTRAMUSCULAR; INTRAVENOUS 3 TIMES DAILY
Status: DISCONTINUED | OUTPATIENT
Start: 2018-01-01 | End: 2018-01-01

## 2018-01-01 RX ORDER — PREDNISONE 10 MG/1
10 TABLET ORAL 2 TIMES DAILY
Status: DISCONTINUED | OUTPATIENT
Start: 2018-01-01 | End: 2018-01-01 | Stop reason: ALTCHOICE

## 2018-01-01 RX ORDER — METHYLPREDNISOLONE SODIUM SUCCINATE 40 MG/ML
40 INJECTION, POWDER, LYOPHILIZED, FOR SOLUTION INTRAMUSCULAR; INTRAVENOUS DAILY
Status: DISCONTINUED | OUTPATIENT
Start: 2018-01-01 | End: 2018-01-01

## 2018-01-01 RX ORDER — MEPERIDINE HYDROCHLORIDE 25 MG/ML
12.5 INJECTION INTRAMUSCULAR; INTRAVENOUS; SUBCUTANEOUS EVERY 5 MIN PRN
Status: DISCONTINUED | OUTPATIENT
Start: 2018-01-01 | End: 2018-01-01 | Stop reason: HOSPADM

## 2018-01-01 RX ORDER — 0.9 % SODIUM CHLORIDE 0.9 %
250 INTRAVENOUS SOLUTION INTRAVENOUS ONCE
Status: COMPLETED | OUTPATIENT
Start: 2018-01-01 | End: 2018-01-01

## 2018-01-01 RX ORDER — SODIUM CHLORIDE, SODIUM LACTATE, POTASSIUM CHLORIDE, AND CALCIUM CHLORIDE .6; .31; .03; .02 G/100ML; G/100ML; G/100ML; G/100ML
1000 INJECTION, SOLUTION INTRAVENOUS ONCE
Status: COMPLETED | OUTPATIENT
Start: 2018-01-01 | End: 2018-01-01

## 2018-01-01 RX ORDER — SODIUM CHLORIDE 0.9 % (FLUSH) 0.9 %
10 SYRINGE (ML) INJECTION EVERY 12 HOURS SCHEDULED
Status: DISCONTINUED | OUTPATIENT
Start: 2018-01-01 | End: 2018-01-01 | Stop reason: HOSPADM

## 2018-01-01 RX ORDER — FAMOTIDINE 20 MG/1
20 TABLET, FILM COATED ORAL 2 TIMES DAILY
Status: DISCONTINUED | OUTPATIENT
Start: 2018-01-01 | End: 2018-01-01 | Stop reason: HOSPADM

## 2018-01-01 RX ORDER — DEXTROSE AND SODIUM CHLORIDE 5; .45 G/100ML; G/100ML
INJECTION, SOLUTION INTRAVENOUS CONTINUOUS
Status: DISCONTINUED | OUTPATIENT
Start: 2018-01-01 | End: 2018-01-01 | Stop reason: CLARIF

## 2018-01-01 RX ORDER — 0.9 % SODIUM CHLORIDE 0.9 %
10 VIAL (ML) INJECTION 2 TIMES DAILY
Status: DISCONTINUED | OUTPATIENT
Start: 2018-01-01 | End: 2018-01-01

## 2018-01-01 RX ORDER — 0.9 % SODIUM CHLORIDE 0.9 %
10 VIAL (ML) INJECTION DAILY
Status: DISCONTINUED | OUTPATIENT
Start: 2018-01-01 | End: 2018-01-01

## 2018-01-01 RX ORDER — POTASSIUM CHLORIDE 7.45 MG/ML
10 INJECTION INTRAVENOUS
Status: COMPLETED | OUTPATIENT
Start: 2018-01-01 | End: 2018-01-01

## 2018-01-01 RX ORDER — PANTOPRAZOLE SODIUM 40 MG/10ML
40 INJECTION, POWDER, LYOPHILIZED, FOR SOLUTION INTRAVENOUS DAILY
Status: DISCONTINUED | OUTPATIENT
Start: 2018-01-01 | End: 2018-01-01 | Stop reason: ALTCHOICE

## 2018-01-01 RX ORDER — LIDOCAINE HYDROCHLORIDE 20 MG/ML
INJECTION, SOLUTION INFILTRATION; PERINEURAL PRN
Status: DISCONTINUED | OUTPATIENT
Start: 2018-01-01 | End: 2018-01-01 | Stop reason: SDUPTHER

## 2018-01-01 RX ORDER — METOPROLOL TARTRATE 50 MG/1
50 TABLET, FILM COATED ORAL 2 TIMES DAILY
Status: DISCONTINUED | OUTPATIENT
Start: 2018-01-01 | End: 2018-01-01

## 2018-01-01 RX ORDER — PANTOPRAZOLE SODIUM 40 MG/10ML
40 INJECTION, POWDER, LYOPHILIZED, FOR SOLUTION INTRAVENOUS EVERY 12 HOURS
Status: DISCONTINUED | OUTPATIENT
Start: 2018-01-01 | End: 2018-01-01

## 2018-01-01 RX ORDER — APIXABAN 5 MG/1
TABLET, FILM COATED ORAL
Qty: 60 TABLET | Refills: 0 | Status: SHIPPED | OUTPATIENT
Start: 2018-01-01

## 2018-01-01 RX ORDER — CIPROFLOXACIN 2 MG/ML
400 INJECTION, SOLUTION INTRAVENOUS EVERY 12 HOURS
Status: DISCONTINUED | OUTPATIENT
Start: 2018-01-01 | End: 2018-01-01

## 2018-01-01 RX ORDER — FUROSEMIDE 10 MG/ML
20 INJECTION INTRAMUSCULAR; INTRAVENOUS ONCE
Status: COMPLETED | OUTPATIENT
Start: 2018-01-01 | End: 2018-01-01

## 2018-01-01 RX ORDER — POTASSIUM CHLORIDE 20MEQ/15ML
20 LIQUID (ML) ORAL DAILY
Status: DISCONTINUED | OUTPATIENT
Start: 2018-01-01 | End: 2018-01-01

## 2018-01-01 RX ORDER — PROPOFOL 10 MG/ML
10 INJECTION, EMULSION INTRAVENOUS
Status: DISCONTINUED | OUTPATIENT
Start: 2018-01-01 | End: 2018-01-01 | Stop reason: ALTCHOICE

## 2018-01-01 RX ORDER — DIGOXIN 0.25 MG/ML
125 INJECTION INTRAMUSCULAR; INTRAVENOUS DAILY
Status: DISCONTINUED | OUTPATIENT
Start: 2018-01-01 | End: 2018-01-01

## 2018-01-01 RX ORDER — DEXTROSE MONOHYDRATE 50 MG/ML
100 INJECTION, SOLUTION INTRAVENOUS PRN
Status: DISCONTINUED | OUTPATIENT
Start: 2018-01-01 | End: 2018-01-01 | Stop reason: ALTCHOICE

## 2018-01-01 RX ORDER — 0.9 % SODIUM CHLORIDE 0.9 %
10 VIAL (ML) INJECTION ONCE
Status: COMPLETED | OUTPATIENT
Start: 2018-01-01 | End: 2018-01-01

## 2018-01-01 RX ORDER — LIDOCAINE HYDROCHLORIDE 10 MG/ML
1 INJECTION, SOLUTION EPIDURAL; INFILTRATION; INTRACAUDAL; PERINEURAL
Status: DISCONTINUED | OUTPATIENT
Start: 2018-01-01 | End: 2018-01-01 | Stop reason: HOSPADM

## 2018-01-01 RX ORDER — 0.9 % SODIUM CHLORIDE 0.9 %
250 INTRAVENOUS SOLUTION INTRAVENOUS ONCE
Status: DISCONTINUED | OUTPATIENT
Start: 2018-01-01 | End: 2018-01-01 | Stop reason: SDUPTHER

## 2018-01-01 RX ORDER — SODIUM CHLORIDE 0.9 % (FLUSH) 0.9 %
10 SYRINGE (ML) INJECTION EVERY 12 HOURS SCHEDULED
Status: DISCONTINUED | OUTPATIENT
Start: 2018-01-01 | End: 2018-01-01 | Stop reason: SDUPTHER

## 2018-01-01 RX ORDER — PROPOFOL 10 MG/ML
INJECTION, EMULSION INTRAVENOUS
Status: COMPLETED
Start: 2018-01-01 | End: 2018-01-01

## 2018-01-01 RX ORDER — AMIODARONE HYDROCHLORIDE 200 MG/1
200 TABLET ORAL EVERY 8 HOURS
Qty: 30 TABLET | Refills: 3 | Status: SHIPPED | OUTPATIENT
Start: 2018-01-01 | End: 2018-01-01 | Stop reason: SDUPTHER

## 2018-01-01 RX ORDER — LEVALBUTEROL TARTRATE 45 UG/1
4 AEROSOL, METERED ORAL 3 TIMES DAILY
Status: DISCONTINUED | OUTPATIENT
Start: 2018-01-01 | End: 2018-01-01 | Stop reason: ALTCHOICE

## 2018-01-01 RX ORDER — FUROSEMIDE 10 MG/ML
20 INJECTION INTRAMUSCULAR; INTRAVENOUS 2 TIMES DAILY
Status: DISCONTINUED | OUTPATIENT
Start: 2018-01-01 | End: 2018-01-01

## 2018-01-01 RX ORDER — DIPHENHYDRAMINE HYDROCHLORIDE 50 MG/ML
12.5 INJECTION INTRAMUSCULAR; INTRAVENOUS
Status: DISCONTINUED | OUTPATIENT
Start: 2018-01-01 | End: 2018-01-01 | Stop reason: HOSPADM

## 2018-01-01 RX ORDER — MORPHINE SULFATE 4 MG/ML
4 INJECTION, SOLUTION INTRAMUSCULAR; INTRAVENOUS
Status: DISCONTINUED | OUTPATIENT
Start: 2018-01-01 | End: 2018-01-01 | Stop reason: HOSPADM

## 2018-01-01 RX ORDER — 0.9 % SODIUM CHLORIDE 0.9 %
500 INTRAVENOUS SOLUTION INTRAVENOUS ONCE
Status: COMPLETED | OUTPATIENT
Start: 2018-01-01 | End: 2018-01-01

## 2018-01-01 RX ORDER — DEXTROSE AND SODIUM CHLORIDE 5; .45 G/100ML; G/100ML
INJECTION, SOLUTION INTRAVENOUS CONTINUOUS
Status: DISCONTINUED | OUTPATIENT
Start: 2018-01-01 | End: 2018-01-01

## 2018-01-01 RX ORDER — SODIUM CHLORIDE 450 MG/100ML
INJECTION, SOLUTION INTRAVENOUS
Status: DISPENSED
Start: 2018-01-01 | End: 2018-01-01

## 2018-01-01 RX ORDER — ACETAMINOPHEN 325 MG/1
650 TABLET ORAL EVERY 4 HOURS PRN
Status: DISCONTINUED | OUTPATIENT
Start: 2018-01-01 | End: 2018-01-01 | Stop reason: HOSPADM

## 2018-01-01 RX ORDER — SODIUM CHLORIDE, SODIUM LACTATE, POTASSIUM CHLORIDE, CALCIUM CHLORIDE 600; 310; 30; 20 MG/100ML; MG/100ML; MG/100ML; MG/100ML
INJECTION, SOLUTION INTRAVENOUS CONTINUOUS
Status: DISCONTINUED | OUTPATIENT
Start: 2018-01-01 | End: 2018-01-01

## 2018-01-01 RX ORDER — METOPROLOL TARTRATE 5 MG/5ML
2.5 INJECTION INTRAVENOUS EVERY 4 HOURS
Status: DISCONTINUED | OUTPATIENT
Start: 2018-01-01 | End: 2018-01-01

## 2018-01-01 RX ORDER — ACETAMINOPHEN 325 MG/1
650 TABLET ORAL EVERY 4 HOURS PRN
Status: DISCONTINUED | OUTPATIENT
Start: 2018-01-01 | End: 2018-01-01 | Stop reason: ALTCHOICE

## 2018-01-01 RX ORDER — ASCORBIC ACID 500 MG
1000 TABLET ORAL DAILY
Status: DISCONTINUED | OUTPATIENT
Start: 2018-01-01 | End: 2018-01-01 | Stop reason: HOSPADM

## 2018-01-01 RX ORDER — CHLORHEXIDINE GLUCONATE 0.12 MG/ML
15 RINSE ORAL 2 TIMES DAILY
Status: DISCONTINUED | OUTPATIENT
Start: 2018-01-01 | End: 2018-01-01 | Stop reason: ALTCHOICE

## 2018-01-01 RX ORDER — DIGOXIN 125 MCG
125 TABLET ORAL EVERY MORNING
Status: DISCONTINUED | OUTPATIENT
Start: 2018-01-01 | End: 2018-01-01 | Stop reason: HOSPADM

## 2018-01-01 RX ORDER — HYDROCODONE BITARTRATE AND ACETAMINOPHEN 5; 325 MG/1; MG/1
2 TABLET ORAL PRN
Status: DISCONTINUED | OUTPATIENT
Start: 2018-01-01 | End: 2018-01-01 | Stop reason: HOSPADM

## 2018-01-01 RX ORDER — LANOLIN ALCOHOL/MO/W.PET/CERES
3 CREAM (GRAM) TOPICAL NIGHTLY PRN
Status: DISCONTINUED | OUTPATIENT
Start: 2018-01-01 | End: 2018-01-01 | Stop reason: HOSPADM

## 2018-01-01 RX ORDER — LIDOCAINE HYDROCHLORIDE 20 MG/ML
5 INJECTION, SOLUTION INFILTRATION; PERINEURAL ONCE
Status: COMPLETED | OUTPATIENT
Start: 2018-01-01 | End: 2018-01-01

## 2018-01-01 RX ORDER — DEXTROSE MONOHYDRATE 25 G/50ML
12.5 INJECTION, SOLUTION INTRAVENOUS PRN
Status: DISCONTINUED | OUTPATIENT
Start: 2018-01-01 | End: 2018-01-01 | Stop reason: ALTCHOICE

## 2018-01-01 RX ORDER — HYDROCODONE BITARTRATE AND ACETAMINOPHEN 5; 325 MG/1; MG/1
1 TABLET ORAL PRN
Status: DISCONTINUED | OUTPATIENT
Start: 2018-01-01 | End: 2018-01-01 | Stop reason: HOSPADM

## 2018-01-01 RX ORDER — 0.9 % SODIUM CHLORIDE 0.9 %
10 VIAL (ML) INJECTION DAILY
Status: DISCONTINUED | OUTPATIENT
Start: 2018-01-01 | End: 2018-01-01 | Stop reason: HOSPADM

## 2018-01-01 RX ORDER — SODIUM CHLORIDE, SODIUM LACTATE, POTASSIUM CHLORIDE, CALCIUM CHLORIDE 600; 310; 30; 20 MG/100ML; MG/100ML; MG/100ML; MG/100ML
INJECTION, SOLUTION INTRAVENOUS CONTINUOUS PRN
Status: DISCONTINUED | OUTPATIENT
Start: 2018-01-01 | End: 2018-01-01 | Stop reason: SDUPTHER

## 2018-01-01 RX ORDER — METOPROLOL TARTRATE 50 MG/1
50 TABLET, FILM COATED ORAL 2 TIMES DAILY
Qty: 60 TABLET | Refills: 0 | Status: SHIPPED | OUTPATIENT
Start: 2018-01-01

## 2018-01-01 RX ORDER — AMIODARONE HYDROCHLORIDE 200 MG/1
200 TABLET ORAL EVERY 8 HOURS
Qty: 60 TABLET | Refills: 0 | Status: SHIPPED | OUTPATIENT
Start: 2018-01-01

## 2018-01-01 RX ORDER — DEXTROSE AND SODIUM CHLORIDE 5; .2 G/100ML; G/100ML
INJECTION, SOLUTION INTRAVENOUS CONTINUOUS
Status: DISCONTINUED | OUTPATIENT
Start: 2018-01-01 | End: 2018-01-01

## 2018-01-01 RX ORDER — 0.9 % SODIUM CHLORIDE 0.9 %
10 VIAL (ML) INJECTION EVERY 12 HOURS
Status: DISCONTINUED | OUTPATIENT
Start: 2018-01-01 | End: 2018-01-01

## 2018-01-01 RX ORDER — NICOTINE POLACRILEX 4 MG
15 LOZENGE BUCCAL PRN
Status: DISCONTINUED | OUTPATIENT
Start: 2018-01-01 | End: 2018-01-01 | Stop reason: ALTCHOICE

## 2018-01-01 RX ORDER — ONDANSETRON 2 MG/ML
4 INJECTION INTRAMUSCULAR; INTRAVENOUS
Status: DISCONTINUED | OUTPATIENT
Start: 2018-01-01 | End: 2018-01-01

## 2018-01-01 RX ORDER — MIDODRINE HYDROCHLORIDE 5 MG/1
5 TABLET ORAL
Status: DISCONTINUED | OUTPATIENT
Start: 2018-01-01 | End: 2018-01-01

## 2018-01-01 RX ORDER — MORPHINE SULFATE 2 MG/ML
2 INJECTION, SOLUTION INTRAMUSCULAR; INTRAVENOUS
Status: DISCONTINUED | OUTPATIENT
Start: 2018-01-01 | End: 2018-01-01 | Stop reason: HOSPADM

## 2018-01-01 RX ORDER — SODIUM CHLORIDE 9 MG/ML
INJECTION, SOLUTION INTRAVENOUS CONTINUOUS PRN
Status: DISCONTINUED | OUTPATIENT
Start: 2018-01-01 | End: 2018-01-01 | Stop reason: SDUPTHER

## 2018-01-01 RX ORDER — FUROSEMIDE 10 MG/ML
40 INJECTION INTRAMUSCULAR; INTRAVENOUS 3 TIMES DAILY
Status: DISCONTINUED | OUTPATIENT
Start: 2018-01-01 | End: 2018-01-01

## 2018-01-01 RX ORDER — FENTANYL CITRATE 50 UG/ML
50 INJECTION, SOLUTION INTRAMUSCULAR; INTRAVENOUS EVERY 10 MIN PRN
Status: DISCONTINUED | OUTPATIENT
Start: 2018-01-01 | End: 2018-01-01 | Stop reason: HOSPADM

## 2018-01-01 RX ORDER — DIGOXIN 125 MCG
125 TABLET ORAL DAILY
Status: DISCONTINUED | OUTPATIENT
Start: 2018-01-01 | End: 2018-01-01

## 2018-01-01 RX ORDER — ONDANSETRON 2 MG/ML
4 INJECTION INTRAMUSCULAR; INTRAVENOUS EVERY 6 HOURS PRN
Status: DISCONTINUED | OUTPATIENT
Start: 2018-01-01 | End: 2018-01-01 | Stop reason: HOSPADM

## 2018-01-01 RX ADMIN — POTASSIUM CHLORIDE 20 MEQ: 20 SOLUTION ORAL at 08:19

## 2018-01-01 RX ADMIN — POTASSIUM CHLORIDE 20 MEQ: 20 SOLUTION ORAL at 08:47

## 2018-01-01 RX ADMIN — DEXTROSE AND SODIUM CHLORIDE: 5; 200 INJECTION, SOLUTION INTRAVENOUS at 23:36

## 2018-01-01 RX ADMIN — PANTOPRAZOLE SODIUM 40 MG: 40 INJECTION, POWDER, FOR SOLUTION INTRAVENOUS at 21:19

## 2018-01-01 RX ADMIN — DOCUSATE SODIUM 100 MG: 50 LIQUID ORAL at 07:45

## 2018-01-01 RX ADMIN — LEVALBUTEROL 1.25 MG: 1.25 SOLUTION, CONCENTRATE RESPIRATORY (INHALATION) at 04:16

## 2018-01-01 RX ADMIN — ACETAMINOPHEN 650 MG: 650 SUPPOSITORY RECTAL at 02:45

## 2018-01-01 RX ADMIN — SODIUM CHLORIDE, POTASSIUM CHLORIDE, SODIUM LACTATE AND CALCIUM CHLORIDE 1000 ML: 600; 310; 30; 20 INJECTION, SOLUTION INTRAVENOUS at 11:29

## 2018-01-01 RX ADMIN — PANTOPRAZOLE SODIUM 40 MG: 40 TABLET, DELAYED RELEASE ORAL at 17:47

## 2018-01-01 RX ADMIN — Medication 4 PUFF: at 13:23

## 2018-01-01 RX ADMIN — LEVALBUTEROL 1.25 MG: 1.25 SOLUTION, CONCENTRATE RESPIRATORY (INHALATION) at 20:39

## 2018-01-01 RX ADMIN — DOCUSATE SODIUM 100 MG: 100 CAPSULE, LIQUID FILLED ORAL at 11:44

## 2018-01-01 RX ADMIN — MEROPENEM 1 G: 1 INJECTION, POWDER, FOR SOLUTION INTRAVENOUS at 20:12

## 2018-01-01 RX ADMIN — MIDODRINE HYDROCHLORIDE 10 MG: 5 TABLET ORAL at 09:06

## 2018-01-01 RX ADMIN — METHYLPREDNISOLONE SODIUM SUCCINATE 40 MG: 40 INJECTION, POWDER, FOR SOLUTION INTRAMUSCULAR; INTRAVENOUS at 06:38

## 2018-01-01 RX ADMIN — METOPROLOL TARTRATE 2.5 MG: 1 INJECTION, SOLUTION INTRAVENOUS at 09:57

## 2018-01-01 RX ADMIN — MEROPENEM 1 G: 1 INJECTION, POWDER, FOR SOLUTION INTRAVENOUS at 16:15

## 2018-01-01 RX ADMIN — FUROSEMIDE 20 MG: 10 INJECTION, SOLUTION INTRAVENOUS at 16:47

## 2018-01-01 RX ADMIN — DOCUSATE SODIUM 100 MG: 50 LIQUID ORAL at 09:05

## 2018-01-01 RX ADMIN — PROPOFOL 30 MCG/KG/MIN: 10 INJECTION, EMULSION INTRAVENOUS at 12:14

## 2018-01-01 RX ADMIN — Medication 400 MG: at 09:22

## 2018-01-01 RX ADMIN — Medication 4 PUFF: at 12:17

## 2018-01-01 RX ADMIN — MEROPENEM 1 G: 1 INJECTION, POWDER, FOR SOLUTION INTRAVENOUS at 14:10

## 2018-01-01 RX ADMIN — Medication 400 MG: at 09:34

## 2018-01-01 RX ADMIN — POTASSIUM CHLORIDE 20 MEQ: 20 SOLUTION ORAL at 09:20

## 2018-01-01 RX ADMIN — ENOXAPARIN SODIUM 40 MG: 40 INJECTION SUBCUTANEOUS at 10:26

## 2018-01-01 RX ADMIN — CIPROFLOXACIN 400 MG: 2 INJECTION, SOLUTION INTRAVENOUS at 11:20

## 2018-01-01 RX ADMIN — POTASSIUM CHLORIDE 40 MEQ: 20 SOLUTION ORAL at 12:22

## 2018-01-01 RX ADMIN — MIDODRINE HYDROCHLORIDE 10 MG: 5 TABLET ORAL at 16:54

## 2018-01-01 RX ADMIN — Medication 10 ML: at 20:00

## 2018-01-01 RX ADMIN — LEVALBUTEROL 1.25 MG: 1.25 SOLUTION, CONCENTRATE RESPIRATORY (INHALATION) at 21:05

## 2018-01-01 RX ADMIN — PHENYLEPHRINE HYDROCHLORIDE 100 MCG: 10 INJECTION INTRAVENOUS at 16:27

## 2018-01-01 RX ADMIN — AMIODARONE HYDROCHLORIDE 150 MG: 50 INJECTION, SOLUTION INTRAVENOUS at 22:50

## 2018-01-01 RX ADMIN — Medication 10 ML: at 08:21

## 2018-01-01 RX ADMIN — PANTOPRAZOLE SODIUM 40 MG: 40 INJECTION, POWDER, FOR SOLUTION INTRAVENOUS at 20:48

## 2018-01-01 RX ADMIN — SODIUM CHLORIDE, POTASSIUM CHLORIDE, SODIUM LACTATE AND CALCIUM CHLORIDE: 600; 310; 30; 20 INJECTION, SOLUTION INTRAVENOUS at 07:29

## 2018-01-01 RX ADMIN — MIDODRINE HYDROCHLORIDE 10 MG: 5 TABLET ORAL at 19:39

## 2018-01-01 RX ADMIN — DEXTROSE AND SODIUM CHLORIDE: 5; 450 INJECTION, SOLUTION INTRAVENOUS at 17:50

## 2018-01-01 RX ADMIN — Medication 4 PUFF: at 11:27

## 2018-01-01 RX ADMIN — AMIODARONE HYDROCHLORIDE 0.5 MG/MIN: 50 INJECTION, SOLUTION INTRAVENOUS at 06:07

## 2018-01-01 RX ADMIN — DEXMEDETOMIDINE HYDROCHLORIDE 0.9 MCG/KG/HR: 100 INJECTION, SOLUTION INTRAVENOUS at 08:48

## 2018-01-01 RX ADMIN — MORPHINE SULFATE 2 MG: 4 INJECTION, SOLUTION INTRAMUSCULAR; INTRAVENOUS at 15:20

## 2018-01-01 RX ADMIN — LEVALBUTEROL 1.25 MG: 1.25 SOLUTION, CONCENTRATE RESPIRATORY (INHALATION) at 14:00

## 2018-01-01 RX ADMIN — INSULIN HUMAN 10 UNITS: 100 INJECTION, SUSPENSION SUBCUTANEOUS at 09:36

## 2018-01-01 RX ADMIN — HEPARIN SODIUM 5000 UNITS: 5000 INJECTION, SOLUTION INTRAVENOUS; SUBCUTANEOUS at 21:11

## 2018-01-01 RX ADMIN — Medication 10 ML: at 09:21

## 2018-01-01 RX ADMIN — AZITHROMYCIN MONOHYDRATE 500 MG: 500 INJECTION, POWDER, LYOPHILIZED, FOR SOLUTION INTRAVENOUS at 09:56

## 2018-01-01 RX ADMIN — METHYLPREDNISOLONE SODIUM SUCCINATE 40 MG: 40 INJECTION, POWDER, FOR SOLUTION INTRAMUSCULAR; INTRAVENOUS at 21:44

## 2018-01-01 RX ADMIN — Medication 400 MG: at 08:21

## 2018-01-01 RX ADMIN — PANTOPRAZOLE SODIUM 40 MG: 40 INJECTION, POWDER, FOR SOLUTION INTRAVENOUS at 09:01

## 2018-01-01 RX ADMIN — CHLORHEXIDINE GLUCONATE 0.12% ORAL RINSE 15 ML: 1.2 LIQUID ORAL at 21:19

## 2018-01-01 RX ADMIN — MIDODRINE HYDROCHLORIDE 10 MG: 5 TABLET ORAL at 16:04

## 2018-01-01 RX ADMIN — MELATONIN TAB 3 MG 3 MG: 3 TAB at 23:52

## 2018-01-01 RX ADMIN — Medication 10 ML: at 09:34

## 2018-01-01 RX ADMIN — Medication 10 ML: at 20:20

## 2018-01-01 RX ADMIN — Medication 0.2 MG: at 14:05

## 2018-01-01 RX ADMIN — OXYCODONE HYDROCHLORIDE AND ACETAMINOPHEN 1000 MG: 500 TABLET ORAL at 08:35

## 2018-01-01 RX ADMIN — Medication 10 ML: at 22:07

## 2018-01-01 RX ADMIN — PROPOFOL 25 MCG/KG/MIN: 10 INJECTION, EMULSION INTRAVENOUS at 18:06

## 2018-01-01 RX ADMIN — INSULIN LISPRO 4 UNITS: 100 INJECTION, SOLUTION INTRAVENOUS; SUBCUTANEOUS at 17:58

## 2018-01-01 RX ADMIN — DOCUSATE SODIUM 100 MG: 50 LIQUID ORAL at 08:19

## 2018-01-01 RX ADMIN — Medication 10 ML: at 21:31

## 2018-01-01 RX ADMIN — Medication 10 ML: at 11:41

## 2018-01-01 RX ADMIN — SODIUM CHLORIDE: 9 INJECTION, SOLUTION INTRAVENOUS at 22:08

## 2018-01-01 RX ADMIN — LIDOCAINE HYDROCHLORIDE 50 MG: 20 INJECTION, SOLUTION INFILTRATION; PERINEURAL at 13:55

## 2018-01-01 RX ADMIN — HEPARIN SODIUM 5000 UNITS: 5000 INJECTION, SOLUTION INTRAVENOUS; SUBCUTANEOUS at 21:47

## 2018-01-01 RX ADMIN — PHENYLEPHRINE HYDROCHLORIDE 100 MCG: 10 INJECTION INTRAVENOUS at 16:10

## 2018-01-01 RX ADMIN — SODIUM CHLORIDE 250 ML: 9 INJECTION, SOLUTION INTRAVENOUS at 11:00

## 2018-01-01 RX ADMIN — PROTHROMBIN, COAGULATION FACTOR VII HUMAN, COAGULATION FACTOR IX HUMAN, COAGULATION FACTOR X HUMAN, PROTEIN C, PROTEIN S HUMAN, AND WATER 3290 UNITS: KIT at 17:40

## 2018-01-01 RX ADMIN — DIGOXIN 125 MCG: 125 TABLET ORAL at 11:45

## 2018-01-01 RX ADMIN — MIDODRINE HYDROCHLORIDE 10 MG: 5 TABLET ORAL at 17:01

## 2018-01-01 RX ADMIN — METHYLPREDNISOLONE SODIUM SUCCINATE 40 MG: 40 INJECTION, POWDER, FOR SOLUTION INTRAMUSCULAR; INTRAVENOUS at 05:40

## 2018-01-01 RX ADMIN — METOPROLOL TARTRATE 100 MG: 50 TABLET ORAL at 21:32

## 2018-01-01 RX ADMIN — Medication 0.1 MG: at 13:55

## 2018-01-01 RX ADMIN — NOREPINEPHRINE BITARTRATE 2 MCG/MIN: 1 INJECTION INTRAVENOUS at 21:20

## 2018-01-01 RX ADMIN — METOPROLOL TARTRATE 100 MG: 50 TABLET ORAL at 20:09

## 2018-01-01 RX ADMIN — PANTOPRAZOLE SODIUM 40 MG: 40 INJECTION, POWDER, FOR SOLUTION INTRAVENOUS at 06:37

## 2018-01-01 RX ADMIN — MIDODRINE HYDROCHLORIDE 10 MG: 5 TABLET ORAL at 17:47

## 2018-01-01 RX ADMIN — FENTANYL CITRATE 50 MCG/HR: 50 INJECTION, SOLUTION INTRAMUSCULAR; INTRAVENOUS at 15:14

## 2018-01-01 RX ADMIN — Medication 400 MG: at 09:21

## 2018-01-01 RX ADMIN — Medication 10 ML: at 10:46

## 2018-01-01 RX ADMIN — Medication 10 ML: at 22:49

## 2018-01-01 RX ADMIN — DOCUSATE SODIUM 100 MG: 50 LIQUID ORAL at 21:55

## 2018-01-01 RX ADMIN — Medication 400 MG: at 08:42

## 2018-01-01 RX ADMIN — Medication 10 ML: at 22:26

## 2018-01-01 RX ADMIN — Medication 4 PUFF: at 20:00

## 2018-01-01 RX ADMIN — INSULIN HUMAN 10 UNITS: 100 INJECTION, SUSPENSION SUBCUTANEOUS at 07:57

## 2018-01-01 RX ADMIN — Medication 10 ML: at 16:54

## 2018-01-01 RX ADMIN — PANTOPRAZOLE SODIUM 40 MG: 40 INJECTION, POWDER, FOR SOLUTION INTRAVENOUS at 09:34

## 2018-01-01 RX ADMIN — MEROPENEM 900 MG: 1 INJECTION, POWDER, FOR SOLUTION INTRAVENOUS at 20:19

## 2018-01-01 RX ADMIN — MIDODRINE HYDROCHLORIDE 10 MG: 5 TABLET ORAL at 08:47

## 2018-01-01 RX ADMIN — LEVALBUTEROL 1.25 MG: 1.25 SOLUTION, CONCENTRATE RESPIRATORY (INHALATION) at 04:10

## 2018-01-01 RX ADMIN — PROPOFOL 10 MG: 10 INJECTION, EMULSION INTRAVENOUS at 16:26

## 2018-01-01 RX ADMIN — Medication 10 ML: at 20:43

## 2018-01-01 RX ADMIN — METHYLPREDNISOLONE SODIUM SUCCINATE 40 MG: 40 INJECTION, POWDER, FOR SOLUTION INTRAMUSCULAR; INTRAVENOUS at 07:45

## 2018-01-01 RX ADMIN — Medication 10 ML: at 20:02

## 2018-01-01 RX ADMIN — PANTOPRAZOLE SODIUM 40 MG: 40 INJECTION, POWDER, FOR SOLUTION INTRAVENOUS at 08:47

## 2018-01-01 RX ADMIN — DEXMEDETOMIDINE HYDROCHLORIDE 2 MCG/HR: 100 INJECTION, SOLUTION INTRAVENOUS at 23:24

## 2018-01-01 RX ADMIN — PANTOPRAZOLE SODIUM 40 MG: 40 INJECTION, POWDER, FOR SOLUTION INTRAVENOUS at 21:07

## 2018-01-01 RX ADMIN — DIGOXIN 125 MCG: 250 INJECTION, SOLUTION INTRAMUSCULAR; INTRAVENOUS; PARENTERAL at 09:55

## 2018-01-01 RX ADMIN — MIDODRINE HYDROCHLORIDE 10 MG: 5 TABLET ORAL at 08:45

## 2018-01-01 RX ADMIN — LIDOCAINE HYDROCHLORIDE 5 ML: 20 INJECTION, SOLUTION INFILTRATION; PERINEURAL at 11:00

## 2018-01-01 RX ADMIN — FUROSEMIDE 20 MG: 10 INJECTION, SOLUTION INTRAVENOUS at 22:23

## 2018-01-01 RX ADMIN — Medication 10 ML: at 21:55

## 2018-01-01 RX ADMIN — MIDODRINE HYDROCHLORIDE 10 MG: 5 TABLET ORAL at 13:34

## 2018-01-01 RX ADMIN — POTASSIUM CHLORIDE 10 MEQ: 7.46 INJECTION, SOLUTION INTRAVENOUS at 12:35

## 2018-01-01 RX ADMIN — POTASSIUM CHLORIDE 10 MEQ: 7.46 INJECTION, SOLUTION INTRAVENOUS at 13:21

## 2018-01-01 RX ADMIN — MEROPENEM 1 G: 1 INJECTION, POWDER, FOR SOLUTION INTRAVENOUS at 22:00

## 2018-01-01 RX ADMIN — Medication 10 ML: at 21:23

## 2018-01-01 RX ADMIN — CHLORHEXIDINE GLUCONATE 0.12% ORAL RINSE 15 ML: 1.2 LIQUID ORAL at 10:25

## 2018-01-01 RX ADMIN — NOREPINEPHRINE BITARTRATE 5 MCG/MIN: 1 INJECTION INTRAVENOUS at 12:50

## 2018-01-01 RX ADMIN — AZITHROMYCIN MONOHYDRATE 500 MG: 500 INJECTION, POWDER, LYOPHILIZED, FOR SOLUTION INTRAVENOUS at 11:19

## 2018-01-01 RX ADMIN — Medication 10 ML: at 08:46

## 2018-01-01 RX ADMIN — MIDODRINE HYDROCHLORIDE 10 MG: 5 TABLET ORAL at 11:46

## 2018-01-01 RX ADMIN — POTASSIUM CHLORIDE 40 MEQ: 20 SOLUTION ORAL at 11:46

## 2018-01-01 RX ADMIN — DEXTROSE AND SODIUM CHLORIDE: 5; 450 INJECTION, SOLUTION INTRAVENOUS at 17:15

## 2018-01-01 RX ADMIN — DEXMEDETOMIDINE HYDROCHLORIDE 0.6 MCG/KG/HR: 100 INJECTION, SOLUTION INTRAVENOUS at 21:52

## 2018-01-01 RX ADMIN — DOCUSATE SODIUM 100 MG: 50 LIQUID ORAL at 20:12

## 2018-01-01 RX ADMIN — NOREPINEPHRINE BITARTRATE 5 MCG/MIN: 1 INJECTION INTRAVENOUS at 11:32

## 2018-01-01 RX ADMIN — PANTOPRAZOLE SODIUM 40 MG: 40 INJECTION, POWDER, FOR SOLUTION INTRAVENOUS at 21:16

## 2018-01-01 RX ADMIN — INSULIN HUMAN 10 UNITS: 100 INJECTION, SUSPENSION SUBCUTANEOUS at 10:00

## 2018-01-01 RX ADMIN — SODIUM CHLORIDE 250 ML: 9 INJECTION, SOLUTION INTRAVENOUS at 15:30

## 2018-01-01 RX ADMIN — PANTOPRAZOLE SODIUM 40 MG: 40 INJECTION, POWDER, FOR SOLUTION INTRAVENOUS at 11:44

## 2018-01-01 RX ADMIN — PROPOFOL 10 MG: 10 INJECTION, EMULSION INTRAVENOUS at 16:03

## 2018-01-01 RX ADMIN — METHYLPREDNISOLONE SODIUM SUCCINATE 40 MG: 40 INJECTION, POWDER, FOR SOLUTION INTRAMUSCULAR; INTRAVENOUS at 21:17

## 2018-01-01 RX ADMIN — PANTOPRAZOLE SODIUM 40 MG: 40 INJECTION, POWDER, FOR SOLUTION INTRAVENOUS at 21:55

## 2018-01-01 RX ADMIN — METHYLPREDNISOLONE SODIUM SUCCINATE 40 MG: 40 INJECTION, POWDER, FOR SOLUTION INTRAMUSCULAR; INTRAVENOUS at 22:25

## 2018-01-01 RX ADMIN — LEVALBUTEROL 1.25 MG: 1.25 SOLUTION, CONCENTRATE RESPIRATORY (INHALATION) at 07:55

## 2018-01-01 RX ADMIN — POTASSIUM CHLORIDE 20 MEQ: 20 SOLUTION ORAL at 08:16

## 2018-01-01 RX ADMIN — FAMOTIDINE 20 MG: 20 TABLET ORAL at 10:25

## 2018-01-01 RX ADMIN — Medication 400 MG: at 08:16

## 2018-01-01 RX ADMIN — PROPOFOL 25 MCG/KG/MIN: 10 INJECTION, EMULSION INTRAVENOUS at 02:24

## 2018-01-01 RX ADMIN — DEXMEDETOMIDINE HYDROCHLORIDE 0.5 MCG/KG/HR: 100 INJECTION, SOLUTION INTRAVENOUS at 01:50

## 2018-01-01 RX ADMIN — Medication 10 ML: at 21:10

## 2018-01-01 RX ADMIN — MIDODRINE HYDROCHLORIDE 10 MG: 5 TABLET ORAL at 08:16

## 2018-01-01 RX ADMIN — PANTOPRAZOLE SODIUM 80 MG: 40 INJECTION, POWDER, FOR SOLUTION INTRAVENOUS at 16:55

## 2018-01-01 RX ADMIN — Medication 10 ML: at 21:18

## 2018-01-01 RX ADMIN — PANTOPRAZOLE SODIUM 40 MG: 40 INJECTION, POWDER, FOR SOLUTION INTRAVENOUS at 11:40

## 2018-01-01 RX ADMIN — POLYETHYLENE GLYCOL-3350 AND ELECTROLYTES 4000 ML: 236; 6.74; 5.86; 2.97; 22.74 POWDER, FOR SOLUTION ORAL at 16:45

## 2018-01-01 RX ADMIN — INSULIN HUMAN 10 UNITS: 100 INJECTION, SUSPENSION SUBCUTANEOUS at 20:11

## 2018-01-01 RX ADMIN — DEXMEDETOMIDINE HYDROCHLORIDE 0.51 MCG/KG/HR: 100 INJECTION, SOLUTION INTRAVENOUS at 14:22

## 2018-01-01 RX ADMIN — SODIUM CHLORIDE 250 ML: 9 INJECTION, SOLUTION INTRAVENOUS at 02:35

## 2018-01-01 RX ADMIN — LEVALBUTEROL 1.25 MG: 1.25 SOLUTION, CONCENTRATE RESPIRATORY (INHALATION) at 04:44

## 2018-01-01 RX ADMIN — METOPROLOL TARTRATE 100 MG: 50 TABLET ORAL at 08:26

## 2018-01-01 RX ADMIN — Medication 4 PUFF: at 04:18

## 2018-01-01 RX ADMIN — METOPROLOL TARTRATE 25 MG: 25 TABLET ORAL at 11:38

## 2018-01-01 RX ADMIN — Medication 10 ML: at 21:28

## 2018-01-01 RX ADMIN — Medication 10 ML: at 09:01

## 2018-01-01 RX ADMIN — PHENYLEPHRINE HYDROCHLORIDE 100 MCG: 10 INJECTION INTRAVENOUS at 16:16

## 2018-01-01 RX ADMIN — Medication 400 MG: at 09:06

## 2018-01-01 RX ADMIN — DEXTROSE MONOHYDRATE: 50 INJECTION, SOLUTION INTRAVENOUS at 09:13

## 2018-01-01 RX ADMIN — INSULIN HUMAN 10 UNITS: 100 INJECTION, SUSPENSION SUBCUTANEOUS at 20:30

## 2018-01-01 RX ADMIN — Medication 10 ML: at 20:31

## 2018-01-01 RX ADMIN — ALTEPLASE 1 MG: 2.2 INJECTION, POWDER, LYOPHILIZED, FOR SOLUTION INTRAVENOUS at 22:14

## 2018-01-01 RX ADMIN — METHYLPREDNISOLONE SODIUM SUCCINATE 40 MG: 40 INJECTION, POWDER, FOR SOLUTION INTRAMUSCULAR; INTRAVENOUS at 13:34

## 2018-01-01 RX ADMIN — PROPOFOL 20.46 MCG/KG/MIN: 10 INJECTION, EMULSION INTRAVENOUS at 02:06

## 2018-01-01 RX ADMIN — PROPOFOL 10 MG: 10 INJECTION, EMULSION INTRAVENOUS at 16:11

## 2018-01-01 RX ADMIN — PROPOFOL 15 MCG/KG/MIN: 10 INJECTION, EMULSION INTRAVENOUS at 14:43

## 2018-01-01 RX ADMIN — CHLORHEXIDINE GLUCONATE 0.12% ORAL RINSE 15 ML: 1.2 LIQUID ORAL at 09:05

## 2018-01-01 RX ADMIN — AZITHROMYCIN MONOHYDRATE 500 MG: 500 INJECTION, POWDER, LYOPHILIZED, FOR SOLUTION INTRAVENOUS at 10:20

## 2018-01-01 RX ADMIN — Medication 10 ML: at 20:24

## 2018-01-01 RX ADMIN — CHLORHEXIDINE GLUCONATE 0.12% ORAL RINSE 15 ML: 1.2 LIQUID ORAL at 08:47

## 2018-01-01 RX ADMIN — SODIUM CHLORIDE 500 ML: 9 INJECTION, SOLUTION INTRAVENOUS at 17:28

## 2018-01-01 RX ADMIN — MEROPENEM 1 G: 1 INJECTION, POWDER, FOR SOLUTION INTRAVENOUS at 11:45

## 2018-01-01 RX ADMIN — DEXMEDETOMIDINE HYDROCHLORIDE 0.2 MCG/KG/HR: 100 INJECTION, SOLUTION INTRAVENOUS at 18:36

## 2018-01-01 RX ADMIN — PANTOPRAZOLE SODIUM 40 MG: 40 INJECTION, POWDER, FOR SOLUTION INTRAVENOUS at 22:25

## 2018-01-01 RX ADMIN — Medication 10 ML: at 11:47

## 2018-01-01 RX ADMIN — METOPROLOL TARTRATE 2.5 MG: 1 INJECTION, SOLUTION INTRAVENOUS at 12:29

## 2018-01-01 RX ADMIN — DOCUSATE SODIUM 100 MG: 50 LIQUID ORAL at 21:10

## 2018-01-01 RX ADMIN — MIDODRINE HYDROCHLORIDE 10 MG: 5 TABLET ORAL at 10:25

## 2018-01-01 RX ADMIN — Medication 10 ML: at 08:49

## 2018-01-01 RX ADMIN — PANTOPRAZOLE SODIUM 40 MG: 40 INJECTION, POWDER, FOR SOLUTION INTRAVENOUS at 21:25

## 2018-01-01 RX ADMIN — MIDODRINE HYDROCHLORIDE 10 MG: 5 TABLET ORAL at 13:09

## 2018-01-01 RX ADMIN — DEXMEDETOMIDINE HYDROCHLORIDE 0.8 MCG/KG/HR: 100 INJECTION, SOLUTION INTRAVENOUS at 10:19

## 2018-01-01 RX ADMIN — DIGOXIN 125 MCG: 250 INJECTION, SOLUTION INTRAMUSCULAR; INTRAVENOUS; PARENTERAL at 08:21

## 2018-01-01 RX ADMIN — Medication 4 PUFF: at 21:00

## 2018-01-01 RX ADMIN — FENTANYL CITRATE 50 MCG/HR: 50 INJECTION, SOLUTION INTRAMUSCULAR; INTRAVENOUS at 11:06

## 2018-01-01 RX ADMIN — HEPARIN SODIUM 5000 UNITS: 5000 INJECTION, SOLUTION INTRAVENOUS; SUBCUTANEOUS at 16:47

## 2018-01-01 RX ADMIN — MEROPENEM 1 G: 1 INJECTION, POWDER, FOR SOLUTION INTRAVENOUS at 03:36

## 2018-01-01 RX ADMIN — Medication 10 ML: at 11:46

## 2018-01-01 RX ADMIN — FUROSEMIDE 20 MG: 10 INJECTION, SOLUTION INTRAVENOUS at 11:45

## 2018-01-01 RX ADMIN — DEXMEDETOMIDINE HYDROCHLORIDE 0.9 MCG/KG/HR: 100 INJECTION, SOLUTION INTRAVENOUS at 02:24

## 2018-01-01 RX ADMIN — Medication 10 ML: at 09:33

## 2018-01-01 RX ADMIN — CHLORHEXIDINE GLUCONATE 0.12% ORAL RINSE 15 ML: 1.2 LIQUID ORAL at 20:22

## 2018-01-01 RX ADMIN — APIXABAN 5 MG: 5 TABLET, FILM COATED ORAL at 08:36

## 2018-01-01 RX ADMIN — SODIUM CHLORIDE 500 ML: 9 INJECTION, SOLUTION INTRAVENOUS at 02:20

## 2018-01-01 RX ADMIN — DOCUSATE SODIUM 100 MG: 100 CAPSULE, LIQUID FILLED ORAL at 08:16

## 2018-01-01 RX ADMIN — POLYETHYLENE GLYCOL 3350, SODIUM SULFATE ANHYDROUS, SODIUM BICARBONATE, SODIUM CHLORIDE, POTASSIUM CHLORIDE 4000 ML: 236; 22.74; 6.74; 5.86; 2.97 POWDER, FOR SOLUTION ORAL at 14:53

## 2018-01-01 RX ADMIN — PHENYLEPHRINE HYDROCHLORIDE 100 MCG: 10 INJECTION INTRAVENOUS at 16:05

## 2018-01-01 RX ADMIN — Medication 10 ML: at 08:17

## 2018-01-01 RX ADMIN — Medication 4 PUFF: at 21:58

## 2018-01-01 RX ADMIN — LEVALBUTEROL 1.25 MG: 1.25 SOLUTION, CONCENTRATE RESPIRATORY (INHALATION) at 13:28

## 2018-01-01 RX ADMIN — DEXTROSE AND SODIUM CHLORIDE: 5; 450 INJECTION, SOLUTION INTRAVENOUS at 08:19

## 2018-01-01 RX ADMIN — DEXTROSE MONOHYDRATE: 50 INJECTION, SOLUTION INTRAVENOUS at 11:43

## 2018-01-01 RX ADMIN — Medication 10 ML: at 20:09

## 2018-01-01 RX ADMIN — Medication 4 PUFF: at 08:04

## 2018-01-01 RX ADMIN — MIDODRINE HYDROCHLORIDE 10 MG: 5 TABLET ORAL at 11:36

## 2018-01-01 RX ADMIN — Medication 4 PUFF: at 13:17

## 2018-01-01 RX ADMIN — DOCUSATE SODIUM 100 MG: 100 CAPSULE, LIQUID FILLED ORAL at 21:05

## 2018-01-01 RX ADMIN — PANTOPRAZOLE SODIUM 40 MG: 40 INJECTION, POWDER, FOR SOLUTION INTRAVENOUS at 20:24

## 2018-01-01 RX ADMIN — Medication 10 ML: at 19:45

## 2018-01-01 RX ADMIN — Medication 10 ML: at 08:45

## 2018-01-01 RX ADMIN — DOCUSATE SODIUM 100 MG: 50 LIQUID ORAL at 20:22

## 2018-01-01 RX ADMIN — FUROSEMIDE 40 MG: 10 INJECTION, SOLUTION INTRAMUSCULAR; INTRAVENOUS at 12:24

## 2018-01-01 RX ADMIN — MIDODRINE HYDROCHLORIDE 10 MG: 5 TABLET ORAL at 08:35

## 2018-01-01 RX ADMIN — AMIODARONE HYDROCHLORIDE 200 MG: 200 TABLET ORAL at 08:27

## 2018-01-01 RX ADMIN — FLUOCINONIDE: 0.05 OINTMENT TOPICAL at 16:08

## 2018-01-01 RX ADMIN — SODIUM CHLORIDE: 9 INJECTION, SOLUTION INTRAVENOUS at 15:55

## 2018-01-01 RX ADMIN — Medication 10 ML: at 08:35

## 2018-01-01 RX ADMIN — Medication 400 MG: at 10:25

## 2018-01-01 RX ADMIN — Medication 10 ML: at 08:36

## 2018-01-01 RX ADMIN — Medication 10 ML: at 08:37

## 2018-01-01 RX ADMIN — MIDODRINE HYDROCHLORIDE 10 MG: 5 TABLET ORAL at 12:30

## 2018-01-01 RX ADMIN — PHENYLEPHRINE HYDROCHLORIDE 100 MCG: 10 INJECTION INTRAVENOUS at 16:07

## 2018-01-01 RX ADMIN — PANTOPRAZOLE SODIUM 40 MG: 40 INJECTION, POWDER, FOR SOLUTION INTRAVENOUS at 08:20

## 2018-01-01 RX ADMIN — PROPOFOL 25 MCG/KG/MIN: 10 INJECTION, EMULSION INTRAVENOUS at 09:56

## 2018-01-01 RX ADMIN — DEXTROSE AND SODIUM CHLORIDE: 5; 450 INJECTION, SOLUTION INTRAVENOUS at 03:17

## 2018-01-01 RX ADMIN — INSULIN LISPRO 4 UNITS: 100 INJECTION, SOLUTION INTRAVENOUS; SUBCUTANEOUS at 13:08

## 2018-01-01 RX ADMIN — PROPOFOL 50 MG: 10 INJECTION, EMULSION INTRAVENOUS at 13:55

## 2018-01-01 RX ADMIN — Medication 10 ML: at 05:44

## 2018-01-01 RX ADMIN — Medication 400 MG: at 07:45

## 2018-01-01 RX ADMIN — MEROPENEM 1 G: 1 INJECTION, POWDER, FOR SOLUTION INTRAVENOUS at 11:32

## 2018-01-01 RX ADMIN — Medication 250 ML: at 09:22

## 2018-01-01 RX ADMIN — Medication 10 ML: at 10:21

## 2018-01-01 RX ADMIN — MEROPENEM 1 G: 1 INJECTION, POWDER, FOR SOLUTION INTRAVENOUS at 04:11

## 2018-01-01 RX ADMIN — MEROPENEM 1 G: 1 INJECTION, POWDER, FOR SOLUTION INTRAVENOUS at 05:08

## 2018-01-01 RX ADMIN — METHYLPREDNISOLONE SODIUM SUCCINATE 40 MG: 40 INJECTION, POWDER, FOR SOLUTION INTRAMUSCULAR; INTRAVENOUS at 12:24

## 2018-01-01 RX ADMIN — AMIODARONE HYDROCHLORIDE 0.5 MG/MIN: 50 INJECTION, SOLUTION INTRAVENOUS at 04:35

## 2018-01-01 RX ADMIN — DEXTROSE MONOHYDRATE 200 ML/HR: 50 INJECTION, SOLUTION INTRAVENOUS at 01:32

## 2018-01-01 RX ADMIN — MIDODRINE HYDROCHLORIDE 10 MG: 5 TABLET ORAL at 09:01

## 2018-01-01 RX ADMIN — LEVALBUTEROL 1.25 MG: 1.25 SOLUTION, CONCENTRATE RESPIRATORY (INHALATION) at 13:45

## 2018-01-01 RX ADMIN — LEVALBUTEROL 1.25 MG: 1.25 SOLUTION, CONCENTRATE RESPIRATORY (INHALATION) at 04:48

## 2018-01-01 RX ADMIN — DEXMEDETOMIDINE HYDROCHLORIDE 0.9 MCG/KG/HR: 100 INJECTION, SOLUTION INTRAVENOUS at 20:36

## 2018-01-01 RX ADMIN — FENTANYL CITRATE 50 MCG/HR: 50 INJECTION, SOLUTION INTRAMUSCULAR; INTRAVENOUS at 14:22

## 2018-01-01 RX ADMIN — AMIODARONE HYDROCHLORIDE 200 MG: 200 TABLET ORAL at 15:47

## 2018-01-01 RX ADMIN — FAMOTIDINE 20 MG: 20 TABLET ORAL at 20:09

## 2018-01-01 RX ADMIN — Medication 10 ML: at 10:19

## 2018-01-01 RX ADMIN — METOPROLOL TARTRATE 100 MG: 50 TABLET ORAL at 08:35

## 2018-01-01 RX ADMIN — MIDODRINE HYDROCHLORIDE 10 MG: 5 TABLET ORAL at 12:51

## 2018-01-01 RX ADMIN — PROPOFOL 10 MG: 10 INJECTION, EMULSION INTRAVENOUS at 16:13

## 2018-01-01 RX ADMIN — PROPOFOL 10 MG: 10 INJECTION, EMULSION INTRAVENOUS at 15:57

## 2018-01-01 RX ADMIN — ACETAMINOPHEN 650 MG: 325 TABLET ORAL at 08:16

## 2018-01-01 RX ADMIN — PANTOPRAZOLE SODIUM 40 MG: 40 INJECTION, POWDER, FOR SOLUTION INTRAVENOUS at 07:07

## 2018-01-01 RX ADMIN — PANTOPRAZOLE SODIUM 40 MG: 40 INJECTION, POWDER, FOR SOLUTION INTRAVENOUS at 09:33

## 2018-01-01 RX ADMIN — Medication 10 ML: at 03:38

## 2018-01-01 RX ADMIN — MELATONIN TAB 3 MG 3 MG: 3 TAB at 21:36

## 2018-01-01 RX ADMIN — AMIODARONE HYDROCHLORIDE 200 MG: 200 TABLET ORAL at 00:02

## 2018-01-01 RX ADMIN — DEXMEDETOMIDINE HYDROCHLORIDE 0.91 MCG/KG/HR: 100 INJECTION, SOLUTION INTRAVENOUS at 02:05

## 2018-01-01 RX ADMIN — PROPOFOL 10 MG: 10 INJECTION, EMULSION INTRAVENOUS at 16:08

## 2018-01-01 RX ADMIN — LIDOCAINE HYDROCHLORIDE 5 ML: 20 INJECTION, SOLUTION INFILTRATION; PERINEURAL at 14:30

## 2018-01-01 RX ADMIN — DEXTROSE AND SODIUM CHLORIDE: 5; 200 INJECTION, SOLUTION INTRAVENOUS at 10:40

## 2018-01-01 RX ADMIN — DOCUSATE SODIUM 100 MG: 50 LIQUID ORAL at 08:16

## 2018-01-01 RX ADMIN — Medication 400 MG: at 11:37

## 2018-01-01 RX ADMIN — ALBUMIN (HUMAN) 25 G: 0.25 INJECTION, SOLUTION INTRAVENOUS at 21:55

## 2018-01-01 RX ADMIN — DEXMEDETOMIDINE HYDROCHLORIDE 0.8 MCG/KG/HR: 100 INJECTION, SOLUTION INTRAVENOUS at 10:23

## 2018-01-01 RX ADMIN — METHYLPREDNISOLONE SODIUM SUCCINATE 40 MG: 40 INJECTION, POWDER, FOR SOLUTION INTRAMUSCULAR; INTRAVENOUS at 09:21

## 2018-01-01 RX ADMIN — POTASSIUM CHLORIDE 20 MEQ: 20 SOLUTION ORAL at 09:05

## 2018-01-01 RX ADMIN — OXYCODONE HYDROCHLORIDE AND ACETAMINOPHEN 1000 MG: 500 TABLET ORAL at 08:26

## 2018-01-01 RX ADMIN — MEROPENEM 1 G: 1 INJECTION, POWDER, FOR SOLUTION INTRAVENOUS at 04:08

## 2018-01-01 RX ADMIN — METOPROLOL TARTRATE 2.5 MG: 1 INJECTION, SOLUTION INTRAVENOUS at 12:34

## 2018-01-01 RX ADMIN — HEPARIN SODIUM 5000 UNITS: 5000 INJECTION, SOLUTION INTRAVENOUS; SUBCUTANEOUS at 16:04

## 2018-01-01 RX ADMIN — MIDODRINE HYDROCHLORIDE 10 MG: 5 TABLET ORAL at 16:52

## 2018-01-01 RX ADMIN — LEVALBUTEROL 1.25 MG: 1.25 SOLUTION, CONCENTRATE RESPIRATORY (INHALATION) at 03:11

## 2018-01-01 RX ADMIN — PANTOPRAZOLE SODIUM 40 MG: 40 INJECTION, POWDER, FOR SOLUTION INTRAVENOUS at 09:06

## 2018-01-01 RX ADMIN — Medication 10 ML: at 20:44

## 2018-01-01 RX ADMIN — LEVALBUTEROL 1.25 MG: 1.25 SOLUTION, CONCENTRATE RESPIRATORY (INHALATION) at 14:43

## 2018-01-01 RX ADMIN — Medication 10 ML: at 21:19

## 2018-01-01 RX ADMIN — LEVALBUTEROL 1.25 MG: 1.25 SOLUTION, CONCENTRATE RESPIRATORY (INHALATION) at 20:41

## 2018-01-01 RX ADMIN — CHLORHEXIDINE GLUCONATE 0.12% ORAL RINSE 15 ML: 1.2 LIQUID ORAL at 20:00

## 2018-01-01 RX ADMIN — DOCUSATE SODIUM 100 MG: 50 LIQUID ORAL at 21:19

## 2018-01-01 RX ADMIN — ALTEPLASE 1 MG: 2.2 INJECTION, POWDER, LYOPHILIZED, FOR SOLUTION INTRAVENOUS at 17:50

## 2018-01-01 RX ADMIN — PROPOFOL 10 MG: 10 INJECTION, EMULSION INTRAVENOUS at 16:05

## 2018-01-01 RX ADMIN — Medication 10 ML: at 10:36

## 2018-01-01 RX ADMIN — PANTOPRAZOLE SODIUM 40 MG: 40 INJECTION, POWDER, FOR SOLUTION INTRAVENOUS at 18:49

## 2018-01-01 RX ADMIN — MIDODRINE HYDROCHLORIDE 10 MG: 5 TABLET ORAL at 12:55

## 2018-01-01 RX ADMIN — POTASSIUM CHLORIDE 10 MEQ: 7.46 INJECTION, SOLUTION INTRAVENOUS at 11:21

## 2018-01-01 RX ADMIN — Medication 10 ML: at 21:48

## 2018-01-01 RX ADMIN — PHENYLEPHRINE HYDROCHLORIDE 100 MCG: 10 INJECTION INTRAVENOUS at 16:12

## 2018-01-01 RX ADMIN — CEFEPIME HYDROCHLORIDE 2 G: 2 INJECTION, POWDER, FOR SOLUTION INTRAVENOUS at 11:44

## 2018-01-01 RX ADMIN — NOREPINEPHRINE BITARTRATE 3.5 MCG/MIN: 1 INJECTION INTRAVENOUS at 18:16

## 2018-01-01 RX ADMIN — MEROPENEM 1 G: 1 INJECTION, POWDER, FOR SOLUTION INTRAVENOUS at 16:12

## 2018-01-01 RX ADMIN — CHLORHEXIDINE GLUCONATE 0.12% ORAL RINSE 15 ML: 1.2 LIQUID ORAL at 20:43

## 2018-01-01 RX ADMIN — DIGOXIN 125 MCG: 125 TABLET ORAL at 08:36

## 2018-01-01 RX ADMIN — PANTOPRAZOLE SODIUM 40 MG: 40 INJECTION, POWDER, FOR SOLUTION INTRAVENOUS at 22:32

## 2018-01-01 RX ADMIN — POTASSIUM CHLORIDE 20 MEQ: 20 SOLUTION ORAL at 09:24

## 2018-01-01 RX ADMIN — Medication 10 ML: at 22:48

## 2018-01-01 RX ADMIN — DEXTROSE MONOHYDRATE: 50 INJECTION, SOLUTION INTRAVENOUS at 05:56

## 2018-01-01 RX ADMIN — Medication 10 ML: at 11:45

## 2018-01-01 RX ADMIN — POTASSIUM CHLORIDE 10 MEQ: 7.46 INJECTION, SOLUTION INTRAVENOUS at 13:39

## 2018-01-01 RX ADMIN — FUROSEMIDE 20 MG: 10 INJECTION, SOLUTION INTRAVENOUS at 09:09

## 2018-01-01 RX ADMIN — DIGOXIN 125 MCG: 125 TABLET ORAL at 10:25

## 2018-01-01 RX ADMIN — MORPHINE SULFATE 2 MG: 4 INJECTION, SOLUTION INTRAMUSCULAR; INTRAVENOUS at 14:19

## 2018-01-01 RX ADMIN — LEVALBUTEROL 1.25 MG: 1.25 SOLUTION, CONCENTRATE RESPIRATORY (INHALATION) at 20:44

## 2018-01-01 RX ADMIN — ALBUMIN (HUMAN) 25 G: 0.25 INJECTION, SOLUTION INTRAVENOUS at 10:09

## 2018-01-01 RX ADMIN — PANTOPRAZOLE SODIUM 40 MG: 40 INJECTION, POWDER, FOR SOLUTION INTRAVENOUS at 08:17

## 2018-01-01 RX ADMIN — Medication 4 PUFF: at 05:35

## 2018-01-01 RX ADMIN — VANCOMYCIN HYDROCHLORIDE 1000 MG: 1 INJECTION, POWDER, LYOPHILIZED, FOR SOLUTION INTRAVENOUS at 03:40

## 2018-01-01 RX ADMIN — PROPOFOL 20 MG: 10 INJECTION, EMULSION INTRAVENOUS at 14:02

## 2018-01-01 RX ADMIN — DOCUSATE SODIUM 100 MG: 100 CAPSULE, LIQUID FILLED ORAL at 08:42

## 2018-01-01 RX ADMIN — Medication 10 ML: at 16:55

## 2018-01-01 RX ADMIN — SODIUM CHLORIDE, POTASSIUM CHLORIDE, SODIUM LACTATE AND CALCIUM CHLORIDE: 600; 310; 30; 20 INJECTION, SOLUTION INTRAVENOUS at 03:40

## 2018-01-01 RX ADMIN — METHYLPREDNISOLONE SODIUM SUCCINATE 40 MG: 40 INJECTION, POWDER, FOR SOLUTION INTRAMUSCULAR; INTRAVENOUS at 12:29

## 2018-01-01 RX ADMIN — AMIODARONE HYDROCHLORIDE 1 MG/MIN: 50 INJECTION, SOLUTION INTRAVENOUS at 23:02

## 2018-01-01 RX ADMIN — LEVALBUTEROL 1.25 MG: 1.25 SOLUTION, CONCENTRATE RESPIRATORY (INHALATION) at 07:43

## 2018-01-01 RX ADMIN — LEVALBUTEROL 1.25 MG: 1.25 SOLUTION, CONCENTRATE RESPIRATORY (INHALATION) at 11:23

## 2018-01-01 RX ADMIN — SODIUM CHLORIDE, POTASSIUM CHLORIDE, SODIUM LACTATE AND CALCIUM CHLORIDE: 600; 310; 30; 20 INJECTION, SOLUTION INTRAVENOUS at 11:21

## 2018-01-01 RX ADMIN — LEVALBUTEROL 1.25 MG: 1.25 SOLUTION, CONCENTRATE RESPIRATORY (INHALATION) at 14:10

## 2018-01-01 RX ADMIN — VANCOMYCIN HYDROCHLORIDE 1000 MG: 1 INJECTION, POWDER, LYOPHILIZED, FOR SOLUTION INTRAVENOUS at 15:24

## 2018-01-01 RX ADMIN — METHYLPREDNISOLONE SODIUM SUCCINATE 40 MG: 40 INJECTION, POWDER, FOR SOLUTION INTRAMUSCULAR; INTRAVENOUS at 21:19

## 2018-01-01 RX ADMIN — Medication 10 ML: at 08:20

## 2018-01-01 RX ADMIN — MIDODRINE HYDROCHLORIDE 10 MG: 5 TABLET ORAL at 11:44

## 2018-01-01 RX ADMIN — DEXMEDETOMIDINE HYDROCHLORIDE 0.8 MCG/KG/HR: 100 INJECTION, SOLUTION INTRAVENOUS at 15:16

## 2018-01-01 RX ADMIN — METHYLPREDNISOLONE SODIUM SUCCINATE 40 MG: 40 INJECTION, POWDER, FOR SOLUTION INTRAMUSCULAR; INTRAVENOUS at 05:35

## 2018-01-01 RX ADMIN — LEVALBUTEROL 1.25 MG: 1.25 SOLUTION, CONCENTRATE RESPIRATORY (INHALATION) at 20:16

## 2018-01-01 RX ADMIN — CHLORHEXIDINE GLUCONATE 0.12% ORAL RINSE 15 ML: 1.2 LIQUID ORAL at 07:45

## 2018-01-01 RX ADMIN — DIGOXIN 125 MCG: 125 TABLET ORAL at 11:38

## 2018-01-01 RX ADMIN — Medication 10 ML: at 08:19

## 2018-01-01 RX ADMIN — METOPROLOL TARTRATE 2.5 MG: 1 INJECTION, SOLUTION INTRAVENOUS at 19:48

## 2018-01-01 RX ADMIN — LEVALBUTEROL 1.25 MG: 1.25 SOLUTION, CONCENTRATE RESPIRATORY (INHALATION) at 19:56

## 2018-01-01 RX ADMIN — PANTOPRAZOLE SODIUM 40 MG: 40 INJECTION, POWDER, FOR SOLUTION INTRAVENOUS at 20:43

## 2018-01-01 RX ADMIN — DEXMEDETOMIDINE HYDROCHLORIDE 0.8 MCG/KG/HR: 100 INJECTION, SOLUTION INTRAVENOUS at 03:03

## 2018-01-01 RX ADMIN — PHENYLEPHRINE HYDROCHLORIDE 100 MCG: 10 INJECTION INTRAVENOUS at 16:25

## 2018-01-01 RX ADMIN — METHYLPREDNISOLONE SODIUM SUCCINATE 40 MG: 40 INJECTION, POWDER, FOR SOLUTION INTRAMUSCULAR; INTRAVENOUS at 13:21

## 2018-01-01 RX ADMIN — METHYLPREDNISOLONE SODIUM SUCCINATE 40 MG: 40 INJECTION, POWDER, FOR SOLUTION INTRAMUSCULAR; INTRAVENOUS at 16:48

## 2018-01-01 RX ADMIN — SODIUM CHLORIDE 250 ML: 9 INJECTION, SOLUTION INTRAVENOUS at 10:00

## 2018-01-01 RX ADMIN — PROPOFOL 20 MCG/KG/MIN: 10 INJECTION, EMULSION INTRAVENOUS at 17:00

## 2018-01-01 RX ADMIN — METHYLPREDNISOLONE SODIUM SUCCINATE 40 MG: 40 INJECTION, POWDER, FOR SOLUTION INTRAMUSCULAR; INTRAVENOUS at 08:17

## 2018-01-01 RX ADMIN — DOCUSATE SODIUM 100 MG: 50 LIQUID ORAL at 09:24

## 2018-01-01 RX ADMIN — SODIUM CHLORIDE 250 ML: 9 INJECTION, SOLUTION INTRAVENOUS at 16:54

## 2018-01-01 RX ADMIN — DOCUSATE SODIUM 100 MG: 50 LIQUID ORAL at 12:24

## 2018-01-01 RX ADMIN — SODIUM CHLORIDE 250 ML: 9 INJECTION, SOLUTION INTRAVENOUS at 15:05

## 2018-01-01 RX ADMIN — MIDODRINE HYDROCHLORIDE 10 MG: 5 TABLET ORAL at 21:05

## 2018-01-01 RX ADMIN — MEROPENEM 1 G: 1 INJECTION, POWDER, FOR SOLUTION INTRAVENOUS at 17:52

## 2018-01-01 RX ADMIN — Medication 10 ML: at 11:42

## 2018-01-01 RX ADMIN — AZITHROMYCIN MONOHYDRATE 500 MG: 500 INJECTION, POWDER, LYOPHILIZED, FOR SOLUTION INTRAVENOUS at 11:38

## 2018-01-01 RX ADMIN — PANTOPRAZOLE SODIUM 40 MG: 40 INJECTION, POWDER, FOR SOLUTION INTRAVENOUS at 10:26

## 2018-01-01 RX ADMIN — DEXMEDETOMIDINE HYDROCHLORIDE 0.3 MCG/KG/HR: 100 INJECTION, SOLUTION INTRAVENOUS at 14:57

## 2018-01-01 RX ADMIN — LEVALBUTEROL 1.25 MG: 1.25 SOLUTION, CONCENTRATE RESPIRATORY (INHALATION) at 04:51

## 2018-01-01 RX ADMIN — DEXTROSE MONOHYDRATE: 50 INJECTION, SOLUTION INTRAVENOUS at 15:50

## 2018-01-01 RX ADMIN — CHLORHEXIDINE GLUCONATE 0.12% ORAL RINSE 15 ML: 1.2 LIQUID ORAL at 08:19

## 2018-01-01 RX ADMIN — MEROPENEM 1 G: 1 INJECTION, POWDER, FOR SOLUTION INTRAVENOUS at 19:45

## 2018-01-01 RX ADMIN — MIDODRINE HYDROCHLORIDE 10 MG: 5 TABLET ORAL at 17:55

## 2018-01-01 RX ADMIN — POTASSIUM CHLORIDE 10 MEQ: 7.46 INJECTION, SOLUTION INTRAVENOUS at 09:52

## 2018-01-01 RX ADMIN — Medication 4 PUFF: at 04:39

## 2018-01-01 RX ADMIN — HEPARIN SODIUM 5000 UNITS: 5000 INJECTION, SOLUTION INTRAVENOUS; SUBCUTANEOUS at 13:34

## 2018-01-01 RX ADMIN — Medication 10 ML: at 21:07

## 2018-01-01 RX ADMIN — FAMOTIDINE 20 MG: 20 TABLET ORAL at 21:32

## 2018-01-01 RX ADMIN — ACETAMINOPHEN 650 MG: 325 TABLET ORAL at 11:55

## 2018-01-01 RX ADMIN — METOPROLOL TARTRATE 25 MG: 25 TABLET ORAL at 08:42

## 2018-01-01 RX ADMIN — CIPROFLOXACIN 400 MG: 2 INJECTION, SOLUTION INTRAVENOUS at 22:33

## 2018-01-01 RX ADMIN — Medication 4 PUFF: at 12:50

## 2018-01-01 RX ADMIN — METHYLPREDNISOLONE SODIUM SUCCINATE 40 MG: 40 INJECTION, POWDER, FOR SOLUTION INTRAMUSCULAR; INTRAVENOUS at 05:30

## 2018-01-01 RX ADMIN — MEROPENEM 1 G: 1 INJECTION, POWDER, FOR SOLUTION INTRAVENOUS at 04:59

## 2018-01-01 RX ADMIN — DEXMEDETOMIDINE HYDROCHLORIDE 0.9 MCG/KG/HR: 100 INJECTION, SOLUTION INTRAVENOUS at 20:30

## 2018-01-01 RX ADMIN — LEVALBUTEROL 1.25 MG: 1.25 SOLUTION, CONCENTRATE RESPIRATORY (INHALATION) at 14:14

## 2018-01-01 RX ADMIN — DOCUSATE SODIUM 100 MG: 50 LIQUID ORAL at 09:22

## 2018-01-01 RX ADMIN — Medication 10 ML: at 07:46

## 2018-01-01 RX ADMIN — ACETAMINOPHEN 650 MG: 325 TABLET ORAL at 09:30

## 2018-01-01 RX ADMIN — AMIODARONE HYDROCHLORIDE 0.5 MG/MIN: 50 INJECTION, SOLUTION INTRAVENOUS at 13:30

## 2018-01-01 RX ADMIN — PANTOPRAZOLE SODIUM 40 MG: 40 INJECTION, POWDER, FOR SOLUTION INTRAVENOUS at 08:36

## 2018-01-01 RX ADMIN — PROPOFOL 60 MG: 10 INJECTION, EMULSION INTRAVENOUS at 15:55

## 2018-01-01 RX ADMIN — METHYLPREDNISOLONE SODIUM SUCCINATE 40 MG: 40 INJECTION, POWDER, FOR SOLUTION INTRAMUSCULAR; INTRAVENOUS at 11:43

## 2018-01-01 RX ADMIN — AZITHROMYCIN MONOHYDRATE 500 MG: 500 INJECTION, POWDER, LYOPHILIZED, FOR SOLUTION INTRAVENOUS at 10:54

## 2018-01-01 RX ADMIN — DEXMEDETOMIDINE HYDROCHLORIDE 0.6 MCG/KG/HR: 100 INJECTION, SOLUTION INTRAVENOUS at 03:39

## 2018-01-01 RX ADMIN — Medication 10 ML: at 09:07

## 2018-01-01 RX ADMIN — Medication 10 ML: at 22:00

## 2018-01-01 RX ADMIN — ALBUMIN (HUMAN) 25 G: 0.25 INJECTION, SOLUTION INTRAVENOUS at 20:12

## 2018-01-01 RX ADMIN — Medication 10 ML: at 09:15

## 2018-01-01 RX ADMIN — FUROSEMIDE 20 MG: 10 INJECTION, SOLUTION INTRAVENOUS at 04:08

## 2018-01-01 RX ADMIN — MEROPENEM 1 G: 1 INJECTION, POWDER, FOR SOLUTION INTRAVENOUS at 20:02

## 2018-01-01 RX ADMIN — APIXABAN 5 MG: 5 TABLET, FILM COATED ORAL at 08:27

## 2018-01-01 RX ADMIN — METOPROLOL TARTRATE 25 MG: 25 TABLET ORAL at 21:27

## 2018-01-01 RX ADMIN — CHLORHEXIDINE GLUCONATE 0.12% ORAL RINSE 15 ML: 1.2 LIQUID ORAL at 20:12

## 2018-01-01 RX ADMIN — MIDODRINE HYDROCHLORIDE 10 MG: 5 TABLET ORAL at 17:14

## 2018-01-01 RX ADMIN — MIDODRINE HYDROCHLORIDE 10 MG: 5 TABLET ORAL at 18:02

## 2018-01-01 RX ADMIN — VANCOMYCIN HYDROCHLORIDE 1000 MG: 1 INJECTION, POWDER, LYOPHILIZED, FOR SOLUTION INTRAVENOUS at 19:39

## 2018-01-01 RX ADMIN — MEROPENEM 100 MG: 1 INJECTION, POWDER, FOR SOLUTION INTRAVENOUS at 11:57

## 2018-01-01 RX ADMIN — DEXMEDETOMIDINE HYDROCHLORIDE 0.5 MCG/KG/HR: 100 INJECTION, SOLUTION INTRAVENOUS at 22:27

## 2018-01-01 RX ADMIN — MIDODRINE HYDROCHLORIDE 10 MG: 5 TABLET ORAL at 08:19

## 2018-01-01 RX ADMIN — Medication 10 ML: at 08:50

## 2018-01-01 RX ADMIN — PANTOPRAZOLE SODIUM 40 MG: 40 INJECTION, POWDER, FOR SOLUTION INTRAVENOUS at 07:45

## 2018-01-01 RX ADMIN — NOREPINEPHRINE BITARTRATE 0.96 MCG/MIN: 1 INJECTION INTRAVENOUS at 11:50

## 2018-01-01 RX ADMIN — HEPARIN SODIUM 5000 UNITS: 5000 INJECTION, SOLUTION INTRAVENOUS; SUBCUTANEOUS at 05:40

## 2018-01-01 RX ADMIN — DOCUSATE SODIUM 100 MG: 50 LIQUID ORAL at 20:43

## 2018-01-01 RX ADMIN — PROPOFOL 15 MCG/KG/MIN: 10 INJECTION, EMULSION INTRAVENOUS at 05:00

## 2018-01-01 RX ADMIN — CEFTRIAXONE SODIUM 1 G: 1 INJECTION, POWDER, FOR SOLUTION INTRAMUSCULAR; INTRAVENOUS at 04:19

## 2018-01-01 RX ADMIN — APIXABAN 5 MG: 5 TABLET, FILM COATED ORAL at 21:31

## 2018-01-01 RX ADMIN — LEVALBUTEROL 1.25 MG: 1.25 SOLUTION, CONCENTRATE RESPIRATORY (INHALATION) at 11:14

## 2018-01-01 RX ADMIN — Medication 10 ML: at 23:36

## 2018-01-01 RX ADMIN — DEXTROSE AND SODIUM CHLORIDE: 5; 200 INJECTION, SOLUTION INTRAVENOUS at 10:02

## 2018-01-01 RX ADMIN — LEVALBUTEROL 1.25 MG: 1.25 SOLUTION, CONCENTRATE RESPIRATORY (INHALATION) at 05:37

## 2018-01-01 RX ADMIN — PROPOFOL 20 MG: 10 INJECTION, EMULSION INTRAVENOUS at 15:59

## 2018-01-01 RX ADMIN — VANCOMYCIN HYDROCHLORIDE 1000 MG: 1 INJECTION, POWDER, LYOPHILIZED, FOR SOLUTION INTRAVENOUS at 04:48

## 2018-01-01 RX ADMIN — CHLORHEXIDINE GLUCONATE 0.12% ORAL RINSE 15 ML: 1.2 LIQUID ORAL at 09:21

## 2018-01-01 RX ADMIN — LEVALBUTEROL 1.25 MG: 1.25 SOLUTION, CONCENTRATE RESPIRATORY (INHALATION) at 13:30

## 2018-01-01 RX ADMIN — METOPROLOL TARTRATE 50 MG: 50 TABLET ORAL at 10:25

## 2018-01-01 RX ADMIN — DIGOXIN 125 MCG: 250 INJECTION, SOLUTION INTRAMUSCULAR; INTRAVENOUS; PARENTERAL at 12:55

## 2018-01-01 RX ADMIN — MIDODRINE HYDROCHLORIDE 10 MG: 5 TABLET ORAL at 16:59

## 2018-01-01 RX ADMIN — MIDODRINE HYDROCHLORIDE 10 MG: 5 TABLET ORAL at 17:41

## 2018-01-01 RX ADMIN — INSULIN LISPRO 4 UNITS: 100 INJECTION, SOLUTION INTRAVENOUS; SUBCUTANEOUS at 07:20

## 2018-01-01 RX ADMIN — MIDODRINE HYDROCHLORIDE 10 MG: 5 TABLET ORAL at 11:43

## 2018-01-01 RX ADMIN — Medication 4 PUFF: at 19:24

## 2018-01-01 RX ADMIN — DEXMEDETOMIDINE HYDROCHLORIDE 0.6 MCG/KG/HR: 100 INJECTION, SOLUTION INTRAVENOUS at 13:56

## 2018-01-01 RX ADMIN — LEVALBUTEROL 1.25 MG: 1.25 SOLUTION, CONCENTRATE RESPIRATORY (INHALATION) at 20:24

## 2018-01-01 RX ADMIN — METHYLPREDNISOLONE SODIUM SUCCINATE 40 MG: 40 INJECTION, POWDER, FOR SOLUTION INTRAMUSCULAR; INTRAVENOUS at 09:56

## 2018-01-01 RX ADMIN — SODIUM CHLORIDE 250 ML: 9 INJECTION, SOLUTION INTRAVENOUS at 22:36

## 2018-01-01 RX ADMIN — Medication 0.1 MG: at 13:58

## 2018-01-01 RX ADMIN — DIGOXIN 125 MCG: 125 TABLET ORAL at 08:27

## 2018-01-01 RX ADMIN — NOREPINEPHRINE BITARTRATE 8 MCG/MIN: 1 INJECTION INTRAVENOUS at 12:47

## 2018-01-01 RX ADMIN — CHLORHEXIDINE GLUCONATE 0.12% ORAL RINSE 15 ML: 1.2 LIQUID ORAL at 21:16

## 2018-01-01 RX ADMIN — Medication 10 ML: at 10:47

## 2018-01-01 RX ADMIN — PANTOPRAZOLE SODIUM 40 MG: 40 INJECTION, POWDER, FOR SOLUTION INTRAVENOUS at 18:26

## 2018-01-01 RX ADMIN — METHYLPREDNISOLONE SODIUM SUCCINATE 40 MG: 40 INJECTION, POWDER, FOR SOLUTION INTRAMUSCULAR; INTRAVENOUS at 21:26

## 2018-01-01 RX ADMIN — Medication 4 PUFF: at 03:33

## 2018-01-01 RX ADMIN — Medication 10 ML: at 08:58

## 2018-01-01 RX ADMIN — Medication 10 ML: at 21:24

## 2018-01-01 RX ADMIN — MEROPENEM 1 G: 1 INJECTION, POWDER, FOR SOLUTION INTRAVENOUS at 18:35

## 2018-01-01 RX ADMIN — HEPARIN SODIUM 5000 UNITS: 5000 INJECTION, SOLUTION INTRAVENOUS; SUBCUTANEOUS at 06:46

## 2018-01-01 RX ADMIN — MEROPENEM 1 G: 1 INJECTION, POWDER, FOR SOLUTION INTRAVENOUS at 03:16

## 2018-01-01 RX ADMIN — Medication 4 PUFF: at 20:37

## 2018-01-01 RX ADMIN — INSULIN LISPRO 4 UNITS: 100 INJECTION, SOLUTION INTRAVENOUS; SUBCUTANEOUS at 01:31

## 2018-01-01 RX ADMIN — Medication 4 PUFF: at 11:29

## 2018-01-01 RX ADMIN — Medication 400 MG: at 08:47

## 2018-01-01 RX ADMIN — CHLORHEXIDINE GLUCONATE 0.12% ORAL RINSE 15 ML: 1.2 LIQUID ORAL at 08:16

## 2018-01-01 RX ADMIN — PROPOFOL 10 MG: 10 INJECTION, EMULSION INTRAVENOUS at 16:21

## 2018-01-01 RX ADMIN — METOPROLOL TARTRATE 2.5 MG: 1 INJECTION, SOLUTION INTRAVENOUS at 17:00

## 2018-01-01 RX ADMIN — DIGOXIN 125 MCG: 250 INJECTION, SOLUTION INTRAMUSCULAR; INTRAVENOUS; PARENTERAL at 09:06

## 2018-01-01 RX ADMIN — Medication 4 PUFF: at 05:28

## 2018-01-01 RX ADMIN — METOPROLOL TARTRATE 2.5 MG: 1 INJECTION, SOLUTION INTRAVENOUS at 05:00

## 2018-01-01 RX ADMIN — LEVALBUTEROL 1.25 MG: 1.25 SOLUTION, CONCENTRATE RESPIRATORY (INHALATION) at 13:25

## 2018-01-01 RX ADMIN — FAMOTIDINE 20 MG: 20 TABLET ORAL at 08:35

## 2018-01-01 RX ADMIN — Medication 400 MG: at 11:44

## 2018-01-01 RX ADMIN — SODIUM CHLORIDE 250 ML: 9 INJECTION, SOLUTION INTRAVENOUS at 14:36

## 2018-01-01 RX ADMIN — PHENYLEPHRINE HYDROCHLORIDE 100 MCG: 10 INJECTION INTRAVENOUS at 16:29

## 2018-01-01 RX ADMIN — HEPARIN SODIUM 5000 UNITS: 5000 INJECTION, SOLUTION INTRAVENOUS; SUBCUTANEOUS at 05:36

## 2018-01-01 RX ADMIN — LEVALBUTEROL 1.25 MG: 1.25 SOLUTION, CONCENTRATE RESPIRATORY (INHALATION) at 20:45

## 2018-01-01 RX ADMIN — Medication 10 ML: at 19:52

## 2018-01-01 RX ADMIN — PANTOPRAZOLE SODIUM 40 MG: 40 INJECTION, POWDER, FOR SOLUTION INTRAVENOUS at 09:54

## 2018-01-01 RX ADMIN — AMIODARONE HYDROCHLORIDE 200 MG: 200 TABLET ORAL at 08:35

## 2018-01-01 RX ADMIN — Medication 10 ML: at 22:47

## 2018-01-01 RX ADMIN — MIDODRINE HYDROCHLORIDE 10 MG: 5 TABLET ORAL at 17:52

## 2018-01-01 RX ADMIN — LEVALBUTEROL 1.25 MG: 1.25 SOLUTION, CONCENTRATE RESPIRATORY (INHALATION) at 04:12

## 2018-01-01 RX ADMIN — DEXTROSE AND SODIUM CHLORIDE: 5; 450 INJECTION, SOLUTION INTRAVENOUS at 22:30

## 2018-01-01 RX ADMIN — SODIUM CHLORIDE, POTASSIUM CHLORIDE, SODIUM LACTATE AND CALCIUM CHLORIDE: 600; 310; 30; 20 INJECTION, SOLUTION INTRAVENOUS at 13:35

## 2018-01-01 RX ADMIN — METHYLPREDNISOLONE SODIUM SUCCINATE 40 MG: 40 INJECTION, POWDER, FOR SOLUTION INTRAMUSCULAR; INTRAVENOUS at 05:23

## 2018-01-01 RX ADMIN — HEPARIN SODIUM 5000 UNITS: 5000 INJECTION, SOLUTION INTRAVENOUS; SUBCUTANEOUS at 12:25

## 2018-01-01 RX ADMIN — DEXMEDETOMIDINE HYDROCHLORIDE 0.9 MCG/KG/HR: 100 INJECTION, SOLUTION INTRAVENOUS at 15:20

## 2018-01-01 RX ADMIN — DEXMEDETOMIDINE HYDROCHLORIDE 0.8 MCG/KG/HR: 100 INJECTION, SOLUTION INTRAVENOUS at 18:15

## 2018-01-01 RX ADMIN — MEROPENEM 1 G: 1 INJECTION, POWDER, FOR SOLUTION INTRAVENOUS at 03:58

## 2018-01-01 RX ADMIN — LEVALBUTEROL 1.25 MG: 1.25 SOLUTION, CONCENTRATE RESPIRATORY (INHALATION) at 19:51

## 2018-01-01 RX ADMIN — DEXTROSE MONOHYDRATE: 50 INJECTION, SOLUTION INTRAVENOUS at 17:21

## 2018-01-01 RX ADMIN — Medication 4 PUFF: at 04:59

## 2018-01-01 RX ADMIN — Medication 10 ML: at 21:17

## 2018-01-01 RX ADMIN — DOCUSATE SODIUM 100 MG: 50 LIQUID ORAL at 22:24

## 2018-01-01 RX ADMIN — Medication 10 ML: at 10:16

## 2018-01-01 RX ADMIN — DOCUSATE SODIUM 100 MG: 100 CAPSULE, LIQUID FILLED ORAL at 20:20

## 2018-01-01 RX ADMIN — LEVALBUTEROL 1.25 MG: 1.25 SOLUTION, CONCENTRATE RESPIRATORY (INHALATION) at 20:18

## 2018-01-01 RX ADMIN — FAMOTIDINE 20 MG: 20 TABLET ORAL at 08:27

## 2018-01-01 RX ADMIN — CEFEPIME HYDROCHLORIDE 2 G: 2 INJECTION, POWDER, FOR SOLUTION INTRAVENOUS at 23:30

## 2018-01-01 RX ADMIN — DEXMEDETOMIDINE HYDROCHLORIDE 0.6 MCG/KG/HR: 100 INJECTION, SOLUTION INTRAVENOUS at 08:34

## 2018-01-01 RX ADMIN — PROPOFOL 10 MG: 10 INJECTION, EMULSION INTRAVENOUS at 16:01

## 2018-01-01 RX ADMIN — PHENYLEPHRINE HYDROCHLORIDE 100 MCG: 10 INJECTION INTRAVENOUS at 16:15

## 2018-01-01 RX ADMIN — INSULIN HUMAN 10 UNITS: 100 INJECTION, SUSPENSION SUBCUTANEOUS at 10:17

## 2018-01-01 RX ADMIN — MIDODRINE HYDROCHLORIDE 10 MG: 5 TABLET ORAL at 09:34

## 2018-01-01 RX ADMIN — MIDODRINE HYDROCHLORIDE 10 MG: 5 TABLET ORAL at 08:42

## 2018-01-01 RX ADMIN — INSULIN HUMAN 10 UNITS: 100 INJECTION, SUSPENSION SUBCUTANEOUS at 20:18

## 2018-01-01 RX ADMIN — SODIUM CHLORIDE 250 ML: 9 INJECTION, SOLUTION INTRAVENOUS at 09:22

## 2018-01-01 RX ADMIN — DIGOXIN 125 MCG: 250 INJECTION, SOLUTION INTRAMUSCULAR; INTRAVENOUS; PARENTERAL at 08:47

## 2018-01-01 RX ADMIN — PANTOPRAZOLE SODIUM 40 MG: 40 INJECTION, POWDER, FOR SOLUTION INTRAVENOUS at 09:20

## 2018-01-01 RX ADMIN — Medication 400 MG: at 08:17

## 2018-01-01 RX ADMIN — MEROPENEM 1 G: 1 INJECTION, POWDER, FOR SOLUTION INTRAVENOUS at 12:23

## 2018-01-01 RX ADMIN — Medication 4 PUFF: at 04:13

## 2018-01-01 RX ADMIN — DOCUSATE SODIUM 100 MG: 50 LIQUID ORAL at 08:47

## 2018-01-01 RX ADMIN — SODIUM CHLORIDE, POTASSIUM CHLORIDE, SODIUM LACTATE AND CALCIUM CHLORIDE 100 ML/HR: 600; 310; 30; 20 INJECTION, SOLUTION INTRAVENOUS at 20:46

## 2018-01-01 RX ADMIN — MEROPENEM 1 G: 1 INJECTION, POWDER, FOR SOLUTION INTRAVENOUS at 04:16

## 2018-01-01 RX ADMIN — FUROSEMIDE 20 MG: 10 INJECTION, SOLUTION INTRAVENOUS at 21:26

## 2018-01-01 RX ADMIN — POTASSIUM CHLORIDE 10 MEQ: 7.46 INJECTION, SOLUTION INTRAVENOUS at 12:23

## 2018-01-01 RX ADMIN — PANTOPRAZOLE SODIUM 40 MG: 40 INJECTION, POWDER, FOR SOLUTION INTRAVENOUS at 08:45

## 2018-01-01 RX ADMIN — METOPROLOL TARTRATE 25 MG: 25 TABLET ORAL at 22:24

## 2018-01-01 RX ADMIN — APIXABAN 5 MG: 5 TABLET, FILM COATED ORAL at 20:08

## 2018-01-01 RX ADMIN — MEROPENEM 1 G: 1 INJECTION, POWDER, FOR SOLUTION INTRAVENOUS at 12:15

## 2018-01-01 RX ADMIN — HEPARIN SODIUM 5000 UNITS: 5000 INJECTION, SOLUTION INTRAVENOUS; SUBCUTANEOUS at 21:23

## 2018-01-01 RX ADMIN — LEVALBUTEROL 1.25 MG: 1.25 SOLUTION, CONCENTRATE RESPIRATORY (INHALATION) at 20:25

## 2018-01-01 RX ADMIN — INSULIN HUMAN 10 UNITS: 100 INJECTION, SUSPENSION SUBCUTANEOUS at 20:22

## 2018-01-01 RX ADMIN — OXYCODONE HYDROCHLORIDE AND ACETAMINOPHEN 1000 MG: 500 TABLET ORAL at 10:25

## 2018-01-01 RX ADMIN — AMIODARONE HYDROCHLORIDE 200 MG: 200 TABLET ORAL at 16:04

## 2018-01-01 RX ADMIN — METHYLPREDNISOLONE SODIUM SUCCINATE 40 MG: 40 INJECTION, POWDER, FOR SOLUTION INTRAMUSCULAR; INTRAVENOUS at 20:43

## 2018-01-01 RX ADMIN — Medication 10 ML: at 20:19

## 2018-01-01 RX ADMIN — MEROPENEM 1 G: 1 INJECTION, POWDER, FOR SOLUTION INTRAVENOUS at 12:58

## 2018-01-01 RX ADMIN — LEVALBUTEROL 1.25 MG: 1.25 SOLUTION, CONCENTRATE RESPIRATORY (INHALATION) at 03:23

## 2018-01-01 RX ADMIN — PANTOPRAZOLE SODIUM 40 MG: 40 INJECTION, POWDER, FOR SOLUTION INTRAVENOUS at 20:19

## 2018-01-01 RX ADMIN — IOPAMIDOL 100 ML: 755 INJECTION, SOLUTION INTRAVENOUS at 16:07

## 2018-01-01 RX ADMIN — DOCUSATE SODIUM 100 MG: 100 CAPSULE, LIQUID FILLED ORAL at 21:48

## 2018-01-01 RX ADMIN — PANTOPRAZOLE SODIUM 40 MG: 40 INJECTION, POWDER, FOR SOLUTION INTRAVENOUS at 10:25

## 2018-01-01 RX ADMIN — MEROPENEM 1 G: 1 INJECTION, POWDER, FOR SOLUTION INTRAVENOUS at 13:34

## 2018-01-01 RX ADMIN — Medication 10 ML: at 09:35

## 2018-01-01 RX ADMIN — CHLORHEXIDINE GLUCONATE 0.12% ORAL RINSE 15 ML: 1.2 LIQUID ORAL at 21:55

## 2018-01-01 RX ADMIN — FUROSEMIDE 20 MG: 10 INJECTION, SOLUTION INTRAVENOUS at 10:15

## 2018-01-01 RX ADMIN — FUROSEMIDE 40 MG: 10 INJECTION, SOLUTION INTRAMUSCULAR; INTRAVENOUS at 22:25

## 2018-01-01 RX ADMIN — DEXTROSE MONOHYDRATE: 50 INJECTION, SOLUTION INTRAVENOUS at 20:31

## 2018-01-01 RX ADMIN — ALBUMIN (HUMAN) 25 G: 0.25 INJECTION, SOLUTION INTRAVENOUS at 08:48

## 2018-01-01 RX ADMIN — NOREPINEPHRINE BITARTRATE 3 MCG/MIN: 1 INJECTION INTRAVENOUS at 17:50

## 2018-01-01 RX ADMIN — FENTANYL CITRATE 50 MCG/HR: 50 INJECTION, SOLUTION INTRAMUSCULAR; INTRAVENOUS at 10:31

## 2018-01-01 RX ADMIN — PANTOPRAZOLE SODIUM 40 MG: 40 INJECTION, POWDER, FOR SOLUTION INTRAVENOUS at 08:21

## 2018-01-01 RX ADMIN — ETOMIDATE 10 MG: 20 INJECTION, SOLUTION INTRAVENOUS at 12:15

## 2018-01-01 RX ADMIN — DOCUSATE SODIUM 100 MG: 50 LIQUID ORAL at 21:24

## 2018-01-01 ASSESSMENT — PULMONARY FUNCTION TESTS
PIF_VALUE: 12
PIF_VALUE: 12
PIF_VALUE: 36
PIF_VALUE: 12
PIF_VALUE: 0
PIF_VALUE: 11
PIF_VALUE: 35
PIF_VALUE: 12
PIF_VALUE: 35
PIF_VALUE: 12
PIF_VALUE: 0
PIF_VALUE: 34
PIF_VALUE: 31
PIF_VALUE: 40
PIF_VALUE: 0
PIF_VALUE: 0
PIF_VALUE: 31
PIF_VALUE: 36
PIF_VALUE: 1
PIF_VALUE: 11
PIF_VALUE: 39
PIF_VALUE: 0
PIF_VALUE: 40
PIF_VALUE: 0
PIF_VALUE: 34
PIF_VALUE: 26
PIF_VALUE: 0
PIF_VALUE: 38
PIF_VALUE: 34
PIF_VALUE: 12
PIF_VALUE: 35
PIF_VALUE: 11
PIF_VALUE: 37
PIF_VALUE: 29
PIF_VALUE: 21
PIF_VALUE: 11
PIF_VALUE: 42
PIF_VALUE: 25
PIF_VALUE: 30
PIF_VALUE: 33
PIF_VALUE: 40
PIF_VALUE: 22
PIF_VALUE: 12
PIF_VALUE: 41
PIF_VALUE: 30
PIF_VALUE: 12
PIF_VALUE: 37
PIF_VALUE: 30
PIF_VALUE: 32
PIF_VALUE: 12
PIF_VALUE: 39
PIF_VALUE: 38
PIF_VALUE: 43
PIF_VALUE: 9
PIF_VALUE: 34
PIF_VALUE: 41
PIF_VALUE: 11
PIF_VALUE: 46
PIF_VALUE: 37
PIF_VALUE: 37
PIF_VALUE: 0
PIF_VALUE: 20
PIF_VALUE: 36
PIF_VALUE: 16
PIF_VALUE: 39
PIF_VALUE: 13
PIF_VALUE: 25
PIF_VALUE: 34
PIF_VALUE: 31
PIF_VALUE: 42
PIF_VALUE: 0
PIF_VALUE: 16
PIF_VALUE: 24
PIF_VALUE: 0
PIF_VALUE: 37
PIF_VALUE: 10
PIF_VALUE: 22
PIF_VALUE: 11
PIF_VALUE: 12
PIF_VALUE: 36
PIF_VALUE: 10
PIF_VALUE: 26
PIF_VALUE: 22
PIF_VALUE: 22
PIF_VALUE: 0
PIF_VALUE: 12
PIF_VALUE: 43
PIF_VALUE: 45
PIF_VALUE: 13
PIF_VALUE: 33
PIF_VALUE: 0
PIF_VALUE: 24
PIF_VALUE: 0
PIF_VALUE: 32
PIF_VALUE: 19
PIF_VALUE: 9
PIF_VALUE: 25
PIF_VALUE: 29
PIF_VALUE: 45
PIF_VALUE: 35
PIF_VALUE: 11
PIF_VALUE: 36
PIF_VALUE: 24
PIF_VALUE: 15
PIF_VALUE: 0
PIF_VALUE: 12
PIF_VALUE: 0
PIF_VALUE: 36
PIF_VALUE: 43
PIF_VALUE: 25
PIF_VALUE: 24
PIF_VALUE: 9
PIF_VALUE: 17
PIF_VALUE: 35
PIF_VALUE: 0
PIF_VALUE: 39
PIF_VALUE: 11
PIF_VALUE: 32
PIF_VALUE: 12
PIF_VALUE: 36
PIF_VALUE: 21
PIF_VALUE: 0
PIF_VALUE: 41
PIF_VALUE: 35
PIF_VALUE: 32
PIF_VALUE: 11
PIF_VALUE: 12
PIF_VALUE: 25
PIF_VALUE: 22
PIF_VALUE: 12
PIF_VALUE: 36
PIF_VALUE: 31
PIF_VALUE: 36
PIF_VALUE: 0
PIF_VALUE: 35
PIF_VALUE: 11
PIF_VALUE: 0
PIF_VALUE: 14
PIF_VALUE: 12
PIF_VALUE: 13
PIF_VALUE: 8.9
PIF_VALUE: 38
PIF_VALUE: 25
PIF_VALUE: 45
PIF_VALUE: 37
PIF_VALUE: 13
PIF_VALUE: 0
PIF_VALUE: 12
PIF_VALUE: 30
PIF_VALUE: 17
PIF_VALUE: 35
PIF_VALUE: 36
PIF_VALUE: 33
PIF_VALUE: 30
PIF_VALUE: 13
PIF_VALUE: 0
PIF_VALUE: 12
PIF_VALUE: 14
PIF_VALUE: 11
PIF_VALUE: 11
PIF_VALUE: 34
PIF_VALUE: 13
PIF_VALUE: 29
PIF_VALUE: 11
PIF_VALUE: 23
PIF_VALUE: 12
PIF_VALUE: 35
PIF_VALUE: 8.3
PIF_VALUE: 11
PIF_VALUE: 33
PIF_VALUE: 24
PIF_VALUE: 13
PIF_VALUE: 17
PIF_VALUE: 37
PIF_VALUE: 33
PIF_VALUE: 40
PIF_VALUE: 19
PIF_VALUE: 34
PIF_VALUE: 46
PIF_VALUE: 37
PIF_VALUE: 36
PIF_VALUE: 10
PIF_VALUE: 37
PIF_VALUE: 11
PIF_VALUE: 35
PIF_VALUE: 12
PIF_VALUE: 13
PIF_VALUE: 11
PIF_VALUE: 32
PIF_VALUE: 29
PIF_VALUE: 11
PIF_VALUE: 2
PIF_VALUE: 12
PIF_VALUE: 13
PIF_VALUE: 30
PIF_VALUE: 28
PIF_VALUE: 24
PIF_VALUE: 17
PIF_VALUE: 26
PIF_VALUE: 31
PIF_VALUE: 39
PIF_VALUE: 19
PIF_VALUE: 0
PIF_VALUE: 17
PIF_VALUE: 22
PIF_VALUE: 23
PIF_VALUE: 12
PIF_VALUE: 43
PIF_VALUE: 31
PIF_VALUE: 12
PIF_VALUE: 12
PIF_VALUE: 40
PIF_VALUE: 36
PIF_VALUE: 28
PIF_VALUE: 37
PIF_VALUE: 13
PIF_VALUE: 15
PIF_VALUE: 21
PIF_VALUE: 29
PIF_VALUE: 39
PIF_VALUE: 0
PIF_VALUE: 40
PIF_VALUE: 22
PIF_VALUE: 0
PIF_VALUE: 22
PIF_VALUE: 24
PIF_VALUE: 22
PIF_VALUE: 35
PIF_VALUE: 11
PIF_VALUE: 11
PIF_VALUE: 31
PIF_VALUE: 43
PIF_VALUE: 23
PIF_VALUE: 35
PIF_VALUE: 35
PIF_VALUE: 34
PIF_VALUE: 34
PIF_VALUE: 19
PIF_VALUE: 11
PIF_VALUE: 32
PIF_VALUE: 0
PIF_VALUE: 34
PIF_VALUE: 26
PIF_VALUE: 34
PIF_VALUE: 36
PIF_VALUE: 28
PIF_VALUE: 24
PIF_VALUE: 0
PIF_VALUE: 0
PIF_VALUE: 1
PIF_VALUE: 36
PIF_VALUE: 22
PIF_VALUE: 42
PIF_VALUE: 12
PIF_VALUE: 36
PIF_VALUE: 36
PIF_VALUE: 0
PIF_VALUE: 23
PIF_VALUE: 34
PIF_VALUE: 40
PIF_VALUE: 8.5
PIF_VALUE: 0
PIF_VALUE: 37
PIF_VALUE: 34
PIF_VALUE: 33
PIF_VALUE: 0
PIF_VALUE: 29
PIF_VALUE: 39
PIF_VALUE: 26
PIF_VALUE: 0
PIF_VALUE: 13
PIF_VALUE: 35
PIF_VALUE: 16
PIF_VALUE: 11
PIF_VALUE: 12
PIF_VALUE: 37
PIF_VALUE: 11
PIF_VALUE: 39
PIF_VALUE: 38
PIF_VALUE: 11
PIF_VALUE: 45
PIF_VALUE: 36
PIF_VALUE: 11
PIF_VALUE: 13
PIF_VALUE: 13
PIF_VALUE: 0
PIF_VALUE: 14
PIF_VALUE: 37
PIF_VALUE: 41
PIF_VALUE: 15
PIF_VALUE: 11
PIF_VALUE: 39
PIF_VALUE: 36
PIF_VALUE: 23
PIF_VALUE: 0
PIF_VALUE: 13
PIF_VALUE: 36
PIF_VALUE: 11
PIF_VALUE: 23
PIF_VALUE: 0
PIF_VALUE: 44
PIF_VALUE: 34
PIF_VALUE: 39
PIF_VALUE: 36
PIF_VALUE: 37
PIF_VALUE: 44
PIF_VALUE: 39
PIF_VALUE: 23
PIF_VALUE: 36
PIF_VALUE: 13
PIF_VALUE: 41
PIF_VALUE: 29
PIF_VALUE: 27
PIF_VALUE: 16
PIF_VALUE: 13
PIF_VALUE: 10
PIF_VALUE: 37
PIF_VALUE: 0
PIF_VALUE: 24
PIF_VALUE: 22
PIF_VALUE: 31
PIF_VALUE: 36
PIF_VALUE: 30
PIF_VALUE: 13
PIF_VALUE: 35
PIF_VALUE: 38
PIF_VALUE: 22
PIF_VALUE: 10
PIF_VALUE: 39
PIF_VALUE: 46
PIF_VALUE: 11
PIF_VALUE: 0
PIF_VALUE: 29
PIF_VALUE: 10
PIF_VALUE: 0
PIF_VALUE: 32
PIF_VALUE: 0
PIF_VALUE: 42
PIF_VALUE: 13
PIF_VALUE: 13
PIF_VALUE: 42
PIF_VALUE: 12
PIF_VALUE: 12
PIF_VALUE: 10
PIF_VALUE: 37
PIF_VALUE: 30
PIF_VALUE: 14
PIF_VALUE: 0
PIF_VALUE: 16
PIF_VALUE: 12
PIF_VALUE: 39
PIF_VALUE: 40
PIF_VALUE: 30
PIF_VALUE: 35
PIF_VALUE: 12
PIF_VALUE: 36
PIF_VALUE: 0
PIF_VALUE: 23
PIF_VALUE: 0
PIF_VALUE: 11
PIF_VALUE: 20
PIF_VALUE: 30
PIF_VALUE: 29
PIF_VALUE: 41
PIF_VALUE: 0
PIF_VALUE: 26
PIF_VALUE: 36
PIF_VALUE: 12
PIF_VALUE: 37
PIF_VALUE: 14
PIF_VALUE: 12
PIF_VALUE: 11
PIF_VALUE: 14
PIF_VALUE: 37
PIF_VALUE: 36
PIF_VALUE: 31
PIF_VALUE: 0
PIF_VALUE: 46
PIF_VALUE: 11
PIF_VALUE: 12
PIF_VALUE: 1
PIF_VALUE: 12
PIF_VALUE: 36
PIF_VALUE: 37
PIF_VALUE: 41
PIF_VALUE: 11
PIF_VALUE: 30
PIF_VALUE: 23
PIF_VALUE: 15
PIF_VALUE: 11
PIF_VALUE: 0
PIF_VALUE: 31
PIF_VALUE: 35
PIF_VALUE: 13
PIF_VALUE: 36
PIF_VALUE: 36
PIF_VALUE: 11
PIF_VALUE: 0
PIF_VALUE: 0
PIF_VALUE: 32
PIF_VALUE: 12
PIF_VALUE: 24
PIF_VALUE: 13
PIF_VALUE: 23
PIF_VALUE: 22
PIF_VALUE: 31
PIF_VALUE: 44
PIF_VALUE: 22
PIF_VALUE: 34
PIF_VALUE: 27
PIF_VALUE: 15
PIF_VALUE: 11
PIF_VALUE: 24
PIF_VALUE: 13
PIF_VALUE: 40
PIF_VALUE: 12
PIF_VALUE: 33
PIF_VALUE: 13
PIF_VALUE: 35
PIF_VALUE: 22
PIF_VALUE: 22
PIF_VALUE: 39
PIF_VALUE: 0
PIF_VALUE: 0
PIF_VALUE: 15
PIF_VALUE: 9.8
PIF_VALUE: 0
PIF_VALUE: 0
PIF_VALUE: 12
PIF_VALUE: 12
PIF_VALUE: 14
PIF_VALUE: 11
PIF_VALUE: 23
PIF_VALUE: 12
PIF_VALUE: 36
PIF_VALUE: 11
PIF_VALUE: 0
PIF_VALUE: 34
PIF_VALUE: 21
PIF_VALUE: 11
PIF_VALUE: 15
PIF_VALUE: 30
PIF_VALUE: 0
PIF_VALUE: 0
PIF_VALUE: 27
PIF_VALUE: 32
PIF_VALUE: 13
PIF_VALUE: 14
PIF_VALUE: 12
PIF_VALUE: 20
PIF_VALUE: 0
PIF_VALUE: 9.9
PIF_VALUE: 13
PIF_VALUE: 23
PIF_VALUE: 38
PIF_VALUE: 27
PIF_VALUE: 15
PIF_VALUE: 36
PIF_VALUE: 11
PIF_VALUE: 9
PIF_VALUE: 20
PIF_VALUE: 31
PIF_VALUE: 0
PIF_VALUE: 37
PIF_VALUE: 36

## 2018-01-01 ASSESSMENT — PAIN SCALES - PAIN ASSESSMENT IN ADVANCED DEMENTIA (PAINAD)
BREATHING: 0
CONSOLABILITY: 0
NEGVOCALIZATION: 0
BREATHING: 0
NEGVOCALIZATION: 0
CONSOLABILITY: 0
TOTALSCORE: 0
FACIALEXPRESSION: 0
FACIALEXPRESSION: 0
BODYLANGUAGE: 0
TOTALSCORE: 0
BREATHING: 0
BODYLANGUAGE: 0
FACIALEXPRESSION: 0
NEGVOCALIZATION: 0
FACIALEXPRESSION: 0
FACIALEXPRESSION: 0
NEGVOCALIZATION: 0
NEGVOCALIZATION: 0
TOTALSCORE: 0
TOTALSCORE: 0
CONSOLABILITY: 0
BREATHING: 0
TOTALSCORE: 0
FACIALEXPRESSION: 0
BREATHING: 0
FACIALEXPRESSION: 0
NEGVOCALIZATION: 0
TOTALSCORE: 0
TOTALSCORE: 0
FACIALEXPRESSION: 0
BODYLANGUAGE: 0
NEGVOCALIZATION: 0
BODYLANGUAGE: 0
TOTALSCORE: 0
FACIALEXPRESSION: 0
BREATHING: 0
CONSOLABILITY: 0
FACIALEXPRESSION: 0
CONSOLABILITY: 0
NEGVOCALIZATION: 0
FACIALEXPRESSION: 0
FACIALEXPRESSION: 0
BODYLANGUAGE: 0
TOTALSCORE: 0
FACIALEXPRESSION: 0
FACIALEXPRESSION: 0
CONSOLABILITY: 0
FACIALEXPRESSION: 0
BREATHING: 0
BREATHING: 0
NEGVOCALIZATION: 0
CONSOLABILITY: 0
BREATHING: 0
BODYLANGUAGE: 0
BREATHING: 0
BODYLANGUAGE: 0
BODYLANGUAGE: 0
FACIALEXPRESSION: 0
CONSOLABILITY: 0
BREATHING: 0
BREATHING: 0
NEGVOCALIZATION: 0
NEGVOCALIZATION: 0
BODYLANGUAGE: 0
CONSOLABILITY: 0
BREATHING: 0
BODYLANGUAGE: 0
BREATHING: 0
TOTALSCORE: 0
FACIALEXPRESSION: 0
NEGVOCALIZATION: 0
TOTALSCORE: 0
CONSOLABILITY: 0
FACIALEXPRESSION: 0
FACIALEXPRESSION: 0
BREATHING: 0
FACIALEXPRESSION: 0
BODYLANGUAGE: 0
BREATHING: 0
NEGVOCALIZATION: 0
CONSOLABILITY: 0
BREATHING: 0
FACIALEXPRESSION: 0
NEGVOCALIZATION: 0
FACIALEXPRESSION: 0
TOTALSCORE: 0
BODYLANGUAGE: 0
BREATHING: 0
CONSOLABILITY: 0
FACIALEXPRESSION: 0
NEGVOCALIZATION: 0
BODYLANGUAGE: 0
BREATHING: 0
FACIALEXPRESSION: 0
BODYLANGUAGE: 0
FACIALEXPRESSION: 0
CONSOLABILITY: 0
CONSOLABILITY: 0
NEGVOCALIZATION: 0
NEGVOCALIZATION: 0
CONSOLABILITY: 0
BREATHING: 0
TOTALSCORE: 0
CONSOLABILITY: 0
BODYLANGUAGE: 0
FACIALEXPRESSION: 0
CONSOLABILITY: 0
BODYLANGUAGE: 0
TOTALSCORE: 0
TOTALSCORE: 1
TOTALSCORE: 0
CONSOLABILITY: 0
FACIALEXPRESSION: 0
CONSOLABILITY: 0
BREATHING: 0
TOTALSCORE: 0
BREATHING: 0
CONSOLABILITY: 0
NEGVOCALIZATION: 0
BODYLANGUAGE: 0
FACIALEXPRESSION: 0
NEGVOCALIZATION: 0
FACIALEXPRESSION: 0
NEGVOCALIZATION: 0
BODYLANGUAGE: 0
TOTALSCORE: 0
NEGVOCALIZATION: 0
BREATHING: 0
TOTALSCORE: 0
TOTALSCORE: 0
BREATHING: 0
TOTALSCORE: 0
NEGVOCALIZATION: 0
BODYLANGUAGE: 0
CONSOLABILITY: 0
BREATHING: 0
TOTALSCORE: 0
BODYLANGUAGE: 0
FACIALEXPRESSION: 0
TOTALSCORE: 0
TOTALSCORE: 0
BODYLANGUAGE: 0
BREATHING: 0
NEGVOCALIZATION: 0
NEGVOCALIZATION: 0
FACIALEXPRESSION: 0
BODYLANGUAGE: 0
NEGVOCALIZATION: 0
CONSOLABILITY: 0
FACIALEXPRESSION: 0
BODYLANGUAGE: 0
NEGVOCALIZATION: 0
NEGVOCALIZATION: 0
FACIALEXPRESSION: 0
TOTALSCORE: 0
BREATHING: 0
NEGVOCALIZATION: 0
BREATHING: 1
BREATHING: 0
CONSOLABILITY: 0
BODYLANGUAGE: 0
BREATHING: 0
TOTALSCORE: 0
FACIALEXPRESSION: 0
CONSOLABILITY: 0
CONSOLABILITY: 0
BODYLANGUAGE: 0
BREATHING: 0
CONSOLABILITY: 0
TOTALSCORE: 0
TOTALSCORE: 0
CONSOLABILITY: 0
CONSOLABILITY: 0
NEGVOCALIZATION: 0
CONSOLABILITY: 0
CONSOLABILITY: 0
TOTALSCORE: 0
BODYLANGUAGE: 0
NEGVOCALIZATION: 0
TOTALSCORE: 0
FACIALEXPRESSION: 0
FACIALEXPRESSION: 0
BREATHING: 0
NEGVOCALIZATION: 0
FACIALEXPRESSION: 0
CONSOLABILITY: 0
NEGVOCALIZATION: 0
BREATHING: 0
FACIALEXPRESSION: 0
BREATHING: 0
CONSOLABILITY: 0
BODYLANGUAGE: 0
TOTALSCORE: 0
BODYLANGUAGE: 0
BODYLANGUAGE: 0
BREATHING: 0
BODYLANGUAGE: 0
BODYLANGUAGE: 0
BREATHING: 0
FACIALEXPRESSION: 0
CONSOLABILITY: 0
FACIALEXPRESSION: 0
BREATHING: 0
BODYLANGUAGE: 0
FACIALEXPRESSION: 0
TOTALSCORE: 0
CONSOLABILITY: 0
FACIALEXPRESSION: 0
BODYLANGUAGE: 0
CONSOLABILITY: 0
BREATHING: 0
TOTALSCORE: 0
FACIALEXPRESSION: 0
CONSOLABILITY: 0
NEGVOCALIZATION: 0
FACIALEXPRESSION: 0
TOTALSCORE: 0
CONSOLABILITY: 0
BREATHING: 0
CONSOLABILITY: 0
TOTALSCORE: 0
NEGVOCALIZATION: 0
TOTALSCORE: 0
BODYLANGUAGE: 0
NEGVOCALIZATION: 0
TOTALSCORE: 0
NEGVOCALIZATION: 0
BODYLANGUAGE: 0
NEGVOCALIZATION: 0
BREATHING: 0
NEGVOCALIZATION: 0
BODYLANGUAGE: 0
TOTALSCORE: 0
TOTALSCORE: 0
CONSOLABILITY: 0
BODYLANGUAGE: 0
TOTALSCORE: 0
BODYLANGUAGE: 0
CONSOLABILITY: 0
TOTALSCORE: 0
CONSOLABILITY: 0
BODYLANGUAGE: 0
NEGVOCALIZATION: 0
CONSOLABILITY: 0
BODYLANGUAGE: 0
BREATHING: 0
BODYLANGUAGE: 0
BREATHING: 0
NEGVOCALIZATION: 0
BODYLANGUAGE: 0
TOTALSCORE: 0
CONSOLABILITY: 0
TOTALSCORE: 0
BODYLANGUAGE: 0
TOTALSCORE: 0
BODYLANGUAGE: 0
BREATHING: 0
BREATHING: 0
BODYLANGUAGE: 0
CONSOLABILITY: 0

## 2018-01-01 ASSESSMENT — PAIN SCALES - WONG BAKER

## 2018-01-01 ASSESSMENT — PAIN SCALES - GENERAL
PAINLEVEL_OUTOF10: 0
PAINLEVEL_OUTOF10: 3
PAINLEVEL_OUTOF10: 0
PAINLEVEL_OUTOF10: 8
PAINLEVEL_OUTOF10: 0

## 2018-01-01 ASSESSMENT — ENCOUNTER SYMPTOMS
STRIDOR: 0
WHEEZING: 0
GASTROINTESTINAL NEGATIVE: 1
CHEST TIGHTNESS: 0
STRIDOR: 0
SHORTNESS OF BREATH: 0
CHEST TIGHTNESS: 0
WHEEZING: 0
COUGH: 0
RESPIRATORY NEGATIVE: 1
EYES NEGATIVE: 1
GASTROINTESTINAL NEGATIVE: 1
WHEEZING: 0
WHEEZING: 0
NAUSEA: 0
EYE PAIN: 0
EYES NEGATIVE: 1
EYES NEGATIVE: 1
COUGH: 0
GASTROINTESTINAL NEGATIVE: 1
EYES NEGATIVE: 1
GASTROINTESTINAL NEGATIVE: 1
WHEEZING: 0
CHEST TIGHTNESS: 0
APNEA: 0
COLOR CHANGE: 0
COUGH: 0
APNEA: 0
STRIDOR: 0
GASTROINTESTINAL NEGATIVE: 1
COUGH: 0
EYES NEGATIVE: 1
NAUSEA: 0
GASTROINTESTINAL NEGATIVE: 1
COUGH: 0
NAUSEA: 0
NAUSEA: 0
WHEEZING: 0
COUGH: 0
CHEST TIGHTNESS: 0
SHORTNESS OF BREATH: 0
STRIDOR: 0
BLOOD IN STOOL: 0
COUGH: 0
SHORTNESS OF BREATH: 1
ALLERGIC/IMMUNOLOGIC NEGATIVE: 1
COUGH: 0
ABDOMINAL PAIN: 0
EYES NEGATIVE: 1
RESPIRATORY NEGATIVE: 1
GASTROINTESTINAL NEGATIVE: 1
SHORTNESS OF BREATH: 1
STRIDOR: 0
CHEST TIGHTNESS: 0
EYE PAIN: 0
CHOKING: 0
RESPIRATORY NEGATIVE: 1
SHORTNESS OF BREATH: 1
GASTROINTESTINAL NEGATIVE: 1
NAUSEA: 0
ABDOMINAL PAIN: 1
NAUSEA: 0
CHEST TIGHTNESS: 0
RESPIRATORY NEGATIVE: 1
WHEEZING: 0
SHORTNESS OF BREATH: 0
COLOR CHANGE: 0
STRIDOR: 0
CHEST TIGHTNESS: 0
EYE DISCHARGE: 0
EYES NEGATIVE: 1
BLOOD IN STOOL: 0
EYES NEGATIVE: 1
COUGH: 0
CHEST TIGHTNESS: 0
TROUBLE SWALLOWING: 0
GASTROINTESTINAL NEGATIVE: 1
BLOOD IN STOOL: 0
BLOOD IN STOOL: 0
WHEEZING: 0
COUGH: 0
NAUSEA: 0
WHEEZING: 0
STRIDOR: 0
NAUSEA: 0
WHEEZING: 0
TROUBLE SWALLOWING: 0
STRIDOR: 0
NAUSEA: 0
EYES NEGATIVE: 1
CHEST TIGHTNESS: 0
SHORTNESS OF BREATH: 0
BLOOD IN STOOL: 0
CHOKING: 0
ABDOMINAL DISTENTION: 0
BLOOD IN STOOL: 0
WHEEZING: 0
ABDOMINAL PAIN: 0
STRIDOR: 0
BLOOD IN STOOL: 0
RESPIRATORY NEGATIVE: 1
NAUSEA: 0
NAUSEA: 0
WHEEZING: 0
EYES NEGATIVE: 1
COUGH: 0
BLOOD IN STOOL: 0
BLOOD IN STOOL: 1
GASTROINTESTINAL NEGATIVE: 1
EYES NEGATIVE: 1
EYES NEGATIVE: 1
EYE DISCHARGE: 0
NAUSEA: 0
RESPIRATORY NEGATIVE: 1
BLOOD IN STOOL: 0
ALLERGIC/IMMUNOLOGIC NEGATIVE: 1
SHORTNESS OF BREATH: 1
COUGH: 0
CHEST TIGHTNESS: 0
ABDOMINAL DISTENTION: 0
SHORTNESS OF BREATH: 0
STRIDOR: 0
CHEST TIGHTNESS: 0
SHORTNESS OF BREATH: 0
STRIDOR: 0
EYES NEGATIVE: 1
GASTROINTESTINAL NEGATIVE: 1
SHORTNESS OF BREATH: 1
STRIDOR: 0
SHORTNESS OF BREATH: 0
CHEST TIGHTNESS: 0
BLOOD IN STOOL: 0

## 2018-01-01 ASSESSMENT — PAIN DESCRIPTION - LOCATION: LOCATION: HEAD

## 2018-01-01 ASSESSMENT — PAIN DESCRIPTION - PAIN TYPE: TYPE: ACUTE PAIN

## 2018-08-13 PROBLEM — R55 PRE-SYNCOPE: Status: ACTIVE | Noted: 2018-01-01

## 2018-08-14 NOTE — CONSULTS
Consult Note  Patient: Mychal Smoke  Unit/Bed: T976/L607-41  YOB: 1935  MRN: [id number]  Acct: [de-identified]   Admitting Diagnosis: Pre-syncope [R55]  Pre-syncope [R55]  Admit Date:  8/14/2018  Hospital Day: 1      Chief Complaint:  Near syncope/confusion    History of Present Illness:  82y male presented to emergency department complaining of lightheadedness, dizziness, confusion episode in onset while at home. He states that he passed out or felt like he was going to pass out. He states that he sat down with this checked [place] he was going to bounce his checkbook he states then he become extremely lightheaded and dizzy wasn't sure if he passed out states he was very confused and disoriented didn't know where he was felt like he couldn't find his rule out any states it's ago couple minutes for this the past to come back to feeling normal.  He has a history of a pacemaker we did interrogate it shows that he's been having runs of A. fib, history of a tavr 2016 at [facility], hypertension, PAD, dyslipidemia. He states that he just had this when he felt like a temporary confusion and he just didn't feel right and he's not quite sure actually if he passed out or not.   No fever, chills, nausea, vomiting, PND, orthopnea    Allergies   Allergen Reactions    Pcn [Penicillins]        Current Facility-Administered Medications   Medication Dose Route Frequency Provider Last Rate Last Dose    sodium chloride flush 0.9 % injection 10 mL  10 mL Intravenous 2 times per day Ferdie Ill, APRN - CNP        sodium chloride flush 0.9 % injection 10 mL  10 mL Intravenous PRN Ferdie Ill, APRN - CNP        magnesium hydroxide (MILK OF MAGNESIA) 400 MG/5ML suspension 30 mL  30 mL Oral Daily PRN Ferdie Ill, APRN - CNP        ondansetron (ZOFRAN) injection 4 mg  4 mg Intravenous Q6H PRN Ferdie Ill, APRN - CNP        famotidine (PEPCID) tablet 20 mg  20 mg Oral BID Ferdie Ill, APRN

## 2018-08-14 NOTE — CONSULTS
vascular disease is notable. NECK:  Supple. CNS:  Pupils are equal and reactive. Eye movements are conjugate. Speech  is normal.  Tongue is midline. Palate moves symmetrically. EXTREMITIES:  Examination reveals no pronator drift. Fine finger movements  are symmetrical.  Strength is 4+/5. He is areflexic in the lower  extremity. I did not attempt to walk him as yet, but he was walking with  the cane in the [place], as I am told by the nurse. This appears to be his  baseline. LABORATORY DATA:  His lab examination is therefore reviewed. CPK level of  72 with the glucose of 104, creatinine 1.0. Liver function tests are  normal.  WBC count of 5.3, hemoglobin 12.8, hematocrit 38.6, platelets  _____,988. IMPRESSION:  Presyncopal episode without true suggestion of any other  findings with disorientation. This likely represents ischemia, suggestive  of a TIA, secondary to cardiac arrhythmias. The patient is now in atrial  fibrillation and will require long-term anticoagulation. CT scan of the  head is pending. The patient will be started on Eliquis, if the CT does  not show any suggestion of an intracerebral hemorrhage or subdural  hematomas. Further recommended on carotid ultrasound and an EEG. We will monitor his  profile. I truly not appreciate any focal findings, to further work him up  for a stroke. Thank you Dr. Janet Rothman for asking us to assist in the care of this patient.         Marck Ballard MD    D: 08/14/2018 11:57:03       T: 08/14/2018 14:03:41     [email or web address]  Job#: [id number]     Doc#: [id number]    CC:

## 2018-08-14 NOTE — PROGRESS NOTES
PM analysis per order Khadijah Colin CNP. 2,190 Vent high rate episodes (AF with RVR) since 6/9/16. Gd pacer capture, sense, and lead impedance. Normal VVIR pacing. No changes made. Battery status OK, est longevity 7 yrs. Dr. Alejandra Man notified.

## 2018-08-14 NOTE — CONSULTS
Inpatient consult to Neurology  Consult performed by: Bee Campbell  Consult ordered by: Zac Blanco      Cerebral TIA secondary to cardiac arythmia  Afib  OK for eliquis if CT shows no bleed  Carotids and EEG  orthostaic bp    Landon Calvo MD, [facility] American Board of Psychiatry & Neurology  Board Certified in Vascular Neurology  Board Certified in Neuromuscular Medicine  Certified in . [place]

## 2018-08-14 NOTE — H&P
[facility]  MEDICINE    HISTORY AND PHYSICAL EXAM    PATIENT NAME:  Laith Gil    MRN:  [id number]  SERVICE DATE:  8/14/2018   SERVICE TIME:  2:36 AM    Primary Care Physician: Akbar Mckeon MD         SUBJECTIVE  CHIEF COMPLAINT:  Lightheadedness    HPI:  This is a 82 y.o. male who presents with significant PMH of bradycardia arrhythmia requiring pacemaker insertion, aortic stenosis, A. fib, HTN, HLD although patient is not on a statin, DVT, and PVD; patient presents to [address] [place] [address] with complaints of getting lightheaded and mildly short of breath for a couple of seconds, patient almost fell, he states he also lost his thought process for a few minutes, was questioning whether he had a CVA, although nothing points to a CVA. Patient states nothing made her symptoms better or worse in the lasted for very short time. Patient had his pacemaker inserted a couple years ago for presyncope, his last pacemaker check was in 2016, he follows Dr. Mp Diaz for cardiology. Patient has no other complaints, currently alert and oriented ×4 without any dizziness or lightheadedness. Patient denies fevers, chest pain, palpitations, abdominal pain, dysuria, numbness or tingling, blurred vision or double vision. PAST MEDICAL HISTORY:    Past Medical History:   Diagnosis Date    Aortic stenosis     Arthritis     Artificial cardiac pacemaker     Atrial fibrillation ([address]     Bradyarrhythmia 1/30/2012    Erectile dysfunction     Fall at home 2/2/2016    History of DVT (deep vein thrombosis)     Hyperlipidemia     Hypertension     Pilonidal cyst     PVD (peripheral vascular disease) ([address]     Rotator cuff tear      PAST SURGICAL HISTORY:    Past Surgical History:   Procedure Laterality Date    CARDIAC CATHETERIZATION  1983    CARDIAC VALVE REPLACEMENT      CATARACT REMOVAL      COLONOSCOPY  10/08/15    DR Ender Garcia    PACEMAKER PLACEMENT  12/15/15     DR. BARTLETT     UPPER GASTROINTESTINAL ENDOSCOPY Assessment: Patient last had his pacemaker interrogated in 2016, patient had pacemaker inserted for presyncope, could be pacemaker failure     Plan:   Consult cardiology  2-D echo in the morning  Pacemaker evaluation in the morning   Check orthostatic blood pressure and pulse  Telemetry monitoring     Hypertension ()    Assessment: Chronic     Plan:   Monitor vital signs  Home meds per Mar  Pt was seen and evaluated and discussed care with NP.  Agree with assessment and plan    Electronically signed by Gonzalo Haskins MD on 8/14/18 at 3:28 AM          Plan of care discussed with: patient    SIGNATURE: MARTHA Gallegos CNP  DATE: August 14, 2018  TIME: 2:36 AM     Electronically signed by MARTHA Gallegos CNP on 8/14/2018 at 2:43 AM    Dr. Forrest Castañeda MD - supervising physician

## 2018-08-14 NOTE — CONSULTS
[facility] CONSULT NOTE    Patient: Inocencio Jeffrey  Unit/Bed: J881/X554-89  YOB: 1935  MRN: [id number]  Acct: [de-identified]   Admitting Diagnosis: Pre-syncope [R55]  Pre-syncope [R55]  Admit Date:  8/14/2018  Hospital Day: 1      Chief Complaint: .  Near syncope    History of Present Illness:   61-year-old male is very well-known to our cardiology service sat down had an event was questionable syncopal event versus a TIA. Check pacemaker did show that he sevens episodes of A. fib. history of tavr, hypertension, ppm, PAD, dyslipidemia. No chest pain at this time resting quietly well informed plan of care    PMHx:  Past Medical History:   Diagnosis Date    Aortic stenosis     Arthritis     Artificial cardiac pacemaker     Atrial fibrillation ([address]     Bradyarrhythmia 1/30/2012    Erectile dysfunction     Fall at home 2/2/2016    History of DVT (deep vein thrombosis)     Hyperlipidemia     Hypertension     Pilonidal cyst     [facility]     Rheumatic fever     rheumatic fever as a kid    Rotator cuff tear        PSHx:  Past Surgical History:   Procedure Laterality Date    CARDIAC CATHETERIZATION  1983    CARDIAC VALVE REPLACEMENT      CATARACT REMOVAL      COLONOSCOPY  10/08/15    DR Sudha Cheek    PACEMAKER PLACEMENT  12/15/15     DR. BARTLETT     UPPER GASTROINTESTINAL ENDOSCOPY  10/08/15    DR Sudha Cheek       Social Hx:  Social History     Social History    Marital status:       Spouse name: N/A    Number of children: N/A    Years of education: N/A     Social History Main Topics    Smoking status: Never Smoker    Smokeless tobacco: Never Used    Alcohol use No    Drug use: No    Sexual activity: Not Currently     Other Topics Concern    None     Social History Narrative    None       Family Hx:  Family History   Problem Relation Age of Onset    Arthritis Mother     Lupus Sister     Cancer Brother         PANCREAS    Lupus Sister Review of Systems:   Review of Systems   Constitutional: Positive for fatigue. HENT: Negative for congestion and trouble swallowing. Eyes: Negative for pain, discharge and visual disturbance. Respiratory: Negative for apnea, cough, choking, chest tightness, shortness of breath, wheezing and stridor. Cardiovascular: Negative for chest pain, palpitations and leg swelling. Gastrointestinal: Negative for abdominal distention, abdominal pain and nausea. Endocrine: Negative for cold intolerance and heat intolerance. Genitourinary: Negative for difficulty urinating. Musculoskeletal: Negative for arthralgias and joint swelling. Skin: Negative for color change and pallor. Allergic/Immunologic: Negative for immunocompromised state. Neurological: Positive for dizziness, syncope (near syncope) and light-headedness. Negative for seizures, facial asymmetry, speech difficulty, weakness, numbness and headaches. Hematological: Negative for adenopathy. Psychiatric/Behavioral: Negative for agitation, behavioral problems and confusion. Allergies:   Allergies   Allergen Reactions    Pcn [Penicillins]        Current Medications:    Current Facility-Administered Medications:     sodium chloride flush 0.9 % injection 10 mL, 10 mL, Intravenous, 2 times per day, Skye Crutch, APRN - CNP    sodium chloride flush 0.9 % injection 10 mL, 10 mL, Intravenous, PRN, Skye Crutch, APRN - CNP    magnesium hydroxide (MILK OF MAGNESIA) 400 MG/5ML suspension 30 mL, 30 mL, Oral, Daily PRN, Skye Crutch, APRN - CNP    ondansetron (ZOFRAN) injection 4 mg, 4 mg, Intravenous, Q6H PRN, Skye Crutch, APRN - CNP    famotidine (PEPCID) tablet 20 mg, 20 mg, Oral, BID, Skye Crutch, APRN - CNP, 20 mg at 08/14/18 1025    acetaminophen (TYLENOL) tablet 650 mg, 650 mg, Oral, Q4H PRN, Skye Crutch, APRN - CNP    vitamin C (ASCORBIC ACID) tablet 1,000 mg, 1,000 mg, Oral, Daily, Skye Crutch, APRN - CNP, 1,000 mg at 08/14/18 1025    digoxin (LANOXIN) tablet 125 mcg, 125 mcg, Oral, QAM, MARTHA Lynch - CNP, 125 mcg at 08/14/18 1025    fluocinonide (LIDEX) 0.05 % ointment, , Topical, Daily, MARTHA Lynch - CNP    apixaban (ELIQUIS) tablet 5 mg, 5 mg, Oral, BID, Josee Khan APRN - CNP    metoprolol tartrate (LOPRESSOR) tablet 100 mg, 100 mg, Oral, BID, MARTHA Valadez - THOMAS    Physical Examination:    /71   Pulse 73   Temp 97.5 °F (36.4 °C) (Oral)   Resp 17   Ht 5' 10\" (1.778 m)   Wt 160 lb 12.8 oz (72.9 kg)   SpO2 99%   BMI 23.07 kg/m²    Physical Exam   Constitutional: He is oriented to person, place, and time. He appears well-developed and well-nourished. No distress. HENT:   Head: Normocephalic and atraumatic. Eyes: Pupils are equal, round, and reactive to light. Conjunctivae are normal. Right eye exhibits no discharge. Neck: Normal range of motion. Neck supple. No JVD present. No tracheal deviation present. Cardiovascular: Normal rate and intact distal pulses. An irregular rhythm present. Exam reveals no gallop and no friction rub. Murmur heard. Systolic murmur is present with a grade of 3/6   Pulmonary/Chest: Effort normal and breath sounds normal. No respiratory distress. He has no wheezes. He has no rales. Abdominal: Soft. Bowel sounds are normal. He exhibits no distension. There is no tenderness. Musculoskeletal: Normal range of motion. He exhibits no edema. Neurological: He is alert and oriented to person, place, and time. No cranial nerve deficit. Skin: Skin is warm. No rash noted. He is not diaphoretic. Psychiatric: He has a normal mood and affect.        LABS:  CBC:   Lab Results   Component Value Date    WBC 5.0 08/14/2018    RBC 3.75 08/14/2018    RBC 3.69 08/13/2018    HGB 13.3 08/14/2018    HCT 38.7 08/14/2018    .0 08/14/2018    MCH 35.3 08/14/2018    MCHC 34.3 08/14/2018    RDW 13.9 08/14/2018     08/14/2018    MPV 9.2 echo    Electronically signed by Harvey De Jesus.  MD Hang Bermudez [facility] Director of Cardiology Services and Cardiac Catheterization Laboratory  [facility], [place]   on 8/14/2018 at 2:07 PM

## 2018-08-15 NOTE — PROGRESS NOTES
Tried to call emergency contact david whom is the daughter to inform her that the pt states he will sign out AMA. No answer and there was not a way for me to LM. Will continue to monitor.  Electronically signed by Светлана Carlos RN on 8/15/2018 at 12:46 PM

## 2018-08-15 NOTE — PROGRESS NOTES
Progress Note  Patient: Estella Kapadia  Unit/Bed: D669/F316-96  YOB: 1935  MRN: [id number]  Acct: [de-identified]   Admitting Diagnosis: Pre-syncope [R55]  Pre-syncope [R55]  Pre-syncope [R55]  Admit Date:  8/14/2018  Hospital Day: 1    Chief Complaint: syncope AF PPM    Histories:  Past Medical History:   Diagnosis Date    Aortic stenosis     Arthritis     Artificial cardiac pacemaker     [facility]rial fibrillation ([facility]     Bradyarrhythmia 1/30/2012    Erectile dysfunction     Fall at home 2/2/2016    History of DVT (deep vein thrombosis)     Hyperlipidemia     Hypertension     Pilonidal cyst     [facility]     Rheumatic fever     rheumatic fever as a kid    Rotator cuff tear      Past Surgical History:   Procedure Laterality Date    CARDIAC CATHETERIZATION  1983    CARDIAC VALVE REPLACEMENT      CATARACT REMOVAL      COLONOSCOPY  10/08/15    DR Slade Chang    PACEMAKER PLACEMENT  12/15/15     DR. BARTLETT     UPPER GASTROINTESTINAL ENDOSCOPY  10/08/15    DR Slade Chang     Family History   Problem Relation Age of Onset    Arthritis Mother     Lupus Sister     Cancer Brother         PANCREAS    Lupus Sister      Social History     Social History    Marital status:      Spouse name: N/A    Number of children: N/A    Years of education: N/A     Social History Main Topics    Smoking status: Never Smoker    Smokeless tobacco: Never Used    Alcohol use No    Drug use: No    Sexual activity: Not Currently     Other Topics Concern    None     Social History Narrative    None       Subjective/HPI: no new events. Denies cp nor sob. Prefers to go hoome asap. EKG: Paced with underlying AF rate  at rest        Review of Systems:   Review of Systems   Constitutional: Positive for fatigue. Negative for diaphoresis. HENT: Negative. Eyes: Negative. Respiratory: Negative. Negative for cough, chest tightness, shortness of breath, wheezing and stridor. Cardiovascular: Negative. Negative for chest pain, palpitations and leg swelling. Gastrointestinal: Negative. Negative for blood in stool and nausea. Genitourinary: Negative. Musculoskeletal: Negative. Skin: Negative. Neurological: Positive for syncope and weakness. Negative for dizziness and light-headedness. Hematological: Negative. Psychiatric/Behavioral: Negative. Physical Examination:    /68   Pulse 70   Temp 97.7 °F (36.5 °C) (Oral)   Resp 18   Ht 5' 10\" (1.778 m)   Wt 160 lb 12.8 oz (72.9 kg)   SpO2 99%   BMI 23.07 kg/m²    Physical Exam   Constitutional: He appears cachectic. No distress. He appears acutely ill. HENT:   Normal cephalic and Atraumatic   Eyes: Pupils are equal, round, and reactive to light. Neck: Normal range of motion and thyroid normal. Neck supple. No JVD present. No neck adenopathy. No thyromegaly present. Cardiovascular: Normal heart sounds, intact distal pulses and normal pulses. An irregularly irregular rhythm present. Tachycardia present. Pulmonary/Chest: Effort normal and breath sounds normal. He has no wheezes. He has no rales. He exhibits no tenderness. Abdominal: Soft. Bowel sounds are normal. There is no tenderness. Musculoskeletal: Normal range of motion. He exhibits no edema or tenderness. Neurological: He is alert and oriented to person, place, and time. Skin: Skin is warm. No cyanosis. Nails show no clubbing.        LABS:  CBC:   Lab Results   Component Value Date    WBC 5.4 08/15/2018    RBC 3.79 08/15/2018    RBC 3.69 08/13/2018    HGB 13.5 08/15/2018    HCT 39.4 08/15/2018    .9 08/15/2018    MCH 35.8 08/15/2018    MCHC 34.4 08/15/2018    RDW 13.7 08/15/2018     08/15/2018    MPV 9.2 08/13/2018     CBC with Differential:    Lab Results   Component Value Date    WBC 5.4 08/15/2018    RBC 3.79 08/15/2018    RBC 3.69 08/13/2018    HGB 13.5 08/15/2018    HCT 39.4 08/15/2018     08/15/2018    MCV 103.9 08/15/2018    MCH 35.8 08/15/2018    MCHC 34.4 08/15/2018    RDW 13.7 08/15/2018    LYMPHOPCT 29.2 08/13/2018    LYMPHOPCT 28.4 06/13/2016    MONOPCT 11.4 08/13/2018    MONOPCT 10.8 06/13/2016    BASOPCT 0.4 08/13/2018    BASOPCT 0.5 06/13/2016    MONOSABS 0.61 08/13/2018    LYMPHSABS 1.56 08/13/2018    EOSABS 0.24 08/13/2018    BASOSABS 0.02 08/13/2018     CMP:    Lab Results   Component Value Date     08/14/2018    K 3.9 08/14/2018     08/14/2018    CO2 25 08/14/2018    BUN 24 08/14/2018    CREATININE 0.79 08/14/2018    GFRAA >60.0 08/14/2018    AGRATIO 1.4 08/13/2018    LABGLOM >60.0 08/14/2018    GLUCOSE 104 08/14/2018    GLUCOSE 104 08/13/2018    PROT 7.1 08/13/2018    LABALBU 4.1 08/13/2018    CALCIUM 9.4 08/14/2018    BILITOT 0.6 08/13/2018    ALKPHOS 61 08/13/2018    AST 18 08/13/2018    ALT 11 08/13/2018     BMP:    Lab Results   Component Value Date     08/14/2018    K 3.9 08/14/2018     08/14/2018    CO2 25 08/14/2018    BUN 24 08/14/2018    LABALBU 4.1 08/13/2018    CREATININE 0.79 08/14/2018    CALCIUM 9.4 08/14/2018    GFRAA >60.0 08/14/2018    LABGLOM >60.0 08/14/2018    GLUCOSE 104 08/14/2018    GLUCOSE 104 08/13/2018     Magnesium:    Lab Results   Component Value Date    MG 2.2 12/16/2015     Troponin:    Lab Results   Component Value Date    TROPONINI 0.03 08/13/2018        Active Hospital Problems    Diagnosis Date Noted    Pre-syncope [R55] 08/13/2018     Priority: Low    Hypertension [I10]      Priority: Low        Assessment/Plan:  1. Syncope- neuro w/u in progress  2. Carotid US negative  3. AF- syncope possibly related to rapid AF. Rate still not to goal.  Continue BB and Dig. Start Amiodarone Load. Follow labs and ECG  4. Check Mag  5. Check TSH  6. Echo EF 60% with severe Pulmonary HTN  7.  Normal TAVR function  8. continue [facility]       Electronically signed by Garrett Carvalho MD on 8/15/2018 at 7:07 AM

## 2018-08-15 NOTE — PROGRESS NOTES
and 127 cm/s respectively. This results in ICA/CCA ratios of approximately 0.5 on the right, and 0.6 on the left, which indicates less than 50% narrowing by velocity ratio criteria. Maximum velocities within the right and left external carotid arteries are 81 and 90 cm/sec respectively. There is antegrade flow in both vertebral arteries. MILD ATHEROSCLEROTIC PLAQUING OF THE CAROTID BULBS WITHOUT EVIDENCE OF A FLOW-LIMITING STENOSIS.   Ultrasound      Recent Labs      08/13/18   2057  08/14/18   0625  08/15/18   0623   WBC  5.3  5.0  5.4   HGB  12.8  13.3*  13.5*   PLT  131*  132  131     Recent Labs      08/13/18   2057  08/14/18   0625  08/15/18   0623   NA  136  140  139   K  3.8  3.9  3.9   CL  102  101  98   CO2   --   25  27   BUN   --   24*  23   CREATININE  1.00  0.79  0.93   GLUCOSE  104*  104  81     Recent Labs      08/13/18 2057   BILITOT  0.6   ALKPHOS  61   AST  18   ALT  11     Lab Results   Component Value Date    PROTIME 12.4 08/13/2018    INR 1.11 08/13/2018     No results found for: LITHIUM, DILFRTOT, VALPROATE    ASSESSMENT AND PLAN      Neurology Follow up    SUBJECTIVE:  NO Headache, double vision,blurry vision,difficulty with speech,difficulty with swallowing,weakness,numbness,pain,nausea,vomitting,chocking,neck pain,dizziness,    PHYSICAL EXAM:    BP (!) 106/58   Pulse 57   Temp 98.1 °F (36.7 °C) (Oral)   Resp 18   Ht 5' 10\" (1.778 m)   Wt 160 lb 12.8 oz (72.9 kg)   SpO2 100%   BMI 23.07 kg/m²    General Appearance:      Mental Status Exam:             Level of Alertness:   awake            Orientation:   person, place, time            Memory:   normal            Fund of Knowledge:  normal            Attention/Concentration:  normal            Language:  normal      Funduscopic Exam:     Cranial Nerves        Cranial nerve II           Visual acuity:  normal           Visual fields:  normal      Cranial nerve III           Pupils:  equal, round, reactive to light      Cranial 101  98   CO2   --   25  27   BUN   --   24*  23   CREATININE  1.00  0.79  0.93   GLUCOSE  104*  104  81     Recent Labs      08/13/18 2057   BILITOT  0.6   ALKPHOS  61   AST  18   ALT  11     Lab Results   Component Value Date    PROTIME 12.4 08/13/2018    INR 1.11 08/13/2018     No results found for: LITHIUM, DILFRTOT, VALPROATE    ASSESSMENT AND PLAN      Neurology Follow up    SUBJECTIVE:  NO Headache, double vision,blurry vision,difficulty with speech,difficulty with swallowing,weakness,numbness,pain,nausea,vomitting,chocking,neck pain,dizziness,    PHYSICAL EXAM:    BP (!) 106/58   Pulse 57   Temp 98.1 °F (36.7 °C) (Oral)   Resp 18   Ht 5' 10\" (1.778 m)   Wt 160 lb 12.8 oz (72.9 kg)   SpO2 100%   BMI 23.07 kg/m²    General Appearance:      Mental Status Exam:             Level of Alertness:   awake            Orientation:   person, place, time            Memory:   normal            Fund of Knowledge:  normal            Attention/Concentration:  normal            Language:  normal      Funduscopic Exam:     Cranial Nerves        Cranial nerve II           Visual acuity:  normal           Visual fields:  normal      Cranial nerve III           Pupils:  equal, round, reactive to light      Cranial nerves III, IV, VI           Extraocular Movements: intact      Cranial nerve V           Facial sensation:  intact      Cranial nerve VII           Facial strength: intact      Cranial nerve VIII           Hearing:  intact      Cranial nerve IX           Palate:  intact      Cranial nerve XI         Shoulder shrug:  intact      Cranial nerve XII          Tongue movement:  normal    Motor:    Drift:  absent  Motor exam is symmetrical 5 out of 5 all extremities bilaterally  Tone:  normal  Abnormal Movements:  absent            Sensory:        Pinprick             Right Upper Extremity:  normal             Left Upper Extremity:  normal             Right Lower Extremity:  normal             Left Lower Extremity:

## 2018-08-16 NOTE — FLOWSHEET NOTE
Pts daughter [place] in to visit pt. Updated on confusion. Daughter reports that the confusion in new and not his baseline.  Electronically signed by Jewel Mcwilliams RN on 8/15/2018 at 9:22 PM

## 2018-08-16 NOTE — FLOWSHEET NOTE
Called and left message for daughter Desmond Bundy to call re: DC, awaiting return call.  Electronically signed by Anibal Da Silva RN on 8/16/2018 at 2:32 PM

## 2018-08-16 NOTE — FLOWSHEET NOTE
At start of shift, pt oriented only to self. Unable to follow safety precautions, even after stating that \"his legs felt like they were going to give out. \" RN attempted to reorient with no success. Pt moved to room across from nurses station for safety. Pt continued to get more confused and disoriented. Verbally abusive. Refusing to go into room. Security to floor. After 20 mins, pt appears more calm and was willing to get into room vs in the hallway. One on one initiated for reorientation and safety. Falls precautions in place. Team to continue to monitor closely.  Electronically signed by Pearl Powers RN on 8/15/2018 at 8:07 PM

## 2018-08-16 NOTE — PROGRESS NOTES
Negative for chest pain, palpitations and leg swelling. Gastrointestinal: Negative. Negative for blood in stool and nausea. Genitourinary: Negative. Musculoskeletal: Positive for gait problem. Skin: Negative. Neurological: Positive for weakness. Negative for dizziness, syncope and light-headedness. Hematological: Negative. Psychiatric/Behavioral: Negative. Physical Examination:    /65   Pulse 55   Temp 97.9 °F (36.6 °C) (Axillary)   Resp 17   Ht 5' 10\" (1.778 m)   Wt 160 lb 12.8 oz (72.9 kg)   SpO2 98%   BMI 23.07 kg/m²    Physical Exam   Constitutional: He appears cachectic. No distress. He appears chronically ill. HENT:   Normal cephalic and Atraumatic   Eyes: Pupils are equal, round, and reactive to light. Neck: Normal range of motion and thyroid normal. Neck supple. No JVD present. No neck adenopathy. No thyromegaly present. Cardiovascular: Normal rate, intact distal pulses and normal pulses. An irregular rhythm present. Murmur heard. Pulmonary/Chest: Effort normal and breath sounds normal. He has no wheezes. He has no rales. He exhibits no tenderness. Abdominal: Soft. Bowel sounds are normal. There is no tenderness. Musculoskeletal: Normal range of motion. He exhibits no edema or tenderness. Neurological: He is alert and oriented to person, place, and time. Skin: Skin is warm. No cyanosis. Nails show no clubbing.        LABS:  CBC:   Lab Results   Component Value Date    WBC 5.8 08/16/2018    RBC 3.70 08/16/2018    RBC 3.69 08/13/2018    HGB 13.2 08/16/2018    HCT 38.3 08/16/2018    .5 08/16/2018    MCH 35.5 08/16/2018    MCHC 34.3 08/16/2018    RDW 13.6 08/16/2018     08/16/2018    MPV 9.2 08/13/2018     CBC with Differential:    Lab Results   Component Value Date    WBC 5.8 08/16/2018    RBC 3.70 08/16/2018    RBC 3.69 08/13/2018    HGB 13.2 08/16/2018    HCT 38.3 08/16/2018     08/16/2018    .5 08/16/2018    MCH 35.5 08/16/2018    MCHC 34.3 08/16/2018    RDW 13.6 08/16/2018    LYMPHOPCT 29.2 08/13/2018    LYMPHOPCT 28.4 06/13/2016    MONOPCT 11.4 08/13/2018    MONOPCT 10.8 06/13/2016    BASOPCT 0.4 08/13/2018    BASOPCT 0.5 06/13/2016    MONOSABS 0.61 08/13/2018    LYMPHSABS 1.56 08/13/2018    EOSABS 0.24 08/13/2018    BASOSABS 0.02 08/13/2018     CMP:    Lab Results   Component Value Date     08/16/2018    K 3.8 08/16/2018    CL 99 08/16/2018    CO2 26 08/16/2018    BUN 22 08/16/2018    CREATININE 0.77 08/16/2018    GFRAA >60.0 08/16/2018    AGRATIO 1.4 08/13/2018    LABGLOM >60.0 08/16/2018    GLUCOSE 77 08/16/2018    GLUCOSE 104 08/13/2018    PROT 7.1 08/13/2018    LABALBU 4.1 08/13/2018    CALCIUM 9.1 08/16/2018    BILITOT 0.6 08/13/2018    ALKPHOS 61 08/13/2018    AST 18 08/13/2018    ALT 11 08/13/2018     BMP:    Lab Results   Component Value Date     08/16/2018    K 3.8 08/16/2018    CL 99 08/16/2018    CO2 26 08/16/2018    BUN 22 08/16/2018    LABALBU 4.1 08/13/2018    CREATININE 0.77 08/16/2018    CALCIUM 9.1 08/16/2018    GFRAA >60.0 08/16/2018    LABGLOM >60.0 08/16/2018    GLUCOSE 77 08/16/2018    GLUCOSE 104 08/13/2018     Magnesium:    Lab Results   Component Value Date    MG 2.0 08/15/2018     Troponin:    Lab Results   Component Value Date    TROPONINI 0.03 08/13/2018        Active Hospital Problems    Diagnosis Date Noted    Pre-syncope [R55] 08/13/2018     Priority: Low    Hypertension [I10]      Priority: Low        Assessment/Plan:   1. Near Syncope- neuro w/u negative  2. Carotid US negative  3. AF- Near syncope possibly related to rapid AF. Rate better. Continue BB and Dig.  and  Amiodarone Load. Amio dose titration in office  4. Check Mag  5. Check TSH  6. Echo EF 60% with severe Pulmonary HTN  7. Normal TAVR function  8. continue [address]  9. OK to dc home  10.  F/u Dr. Mariam Saeed       Electronically signed by Jona Yeager MD on 8/16/2018 at 9:43 AM

## 2018-08-17 NOTE — DISCHARGE SUMMARY
Hospital Medicine Discharge Summary    Jeffrey Cortés  :  1935  MRN:  [id number]    Admit date:  2018  Discharge date:  2017    Admitting Physician:  Maryan Denson MD  Primary Care Physician:  Yolanda Diamond MD      Discharge Diagnoses:    Principal Problem:    Pre-syncope  Active Problems:    Hypertension  Resolved Problems:    * No resolved hospital problems. *    No chief complaint on file. Hospital Course:   Jeffrey Cortés is a 82 y.o. male that was admitted and treated at [facility] for the following medical issues:     Principal Problem:    Pre-syncope  Active Problems:    Hypertension  Resolved Problems:    * No resolved hospital problems. *        Patient moved to the hospital with syncope, likely mechanical fall. A total evaluation noted to be in atrial fibrillation, rate difficult to control, amiodarone loading initiated in the hospital.  Seen by cardiology, oral anticoagulation and antiarrhythmic medication was managed and addressed by cardiology. Echocardiogram reviewed, TAVR within normal limits. Seen by neurology, no acute neurological pathology. Patient subsequently discharged with outpatient follow-up. Pt was discharge in a stable condition. /73   Pulse 62   Temp 97.8 °F (36.6 °C) (Axillary)   Resp 16   Ht 5' 10\" (1.778 m)   Wt 160 lb 12.8 oz (72.9 kg)   SpO2 99%   BMI 23.07 kg/m²     Patient was seen by the following consultants while admitted to [facility]:   Consults:  IP CONSULT TO CARDIOLOGY  IP CONSULT TO NEUROLOGY    Significant Diagnostic Studies:    Ct Head Wo Contrast    Result Date: 2018  CT Brain Contrast medium:  Not utilized. History:  Syncope Comparison:  2015 Findings: Extra-axial spaces:  Normal. Intracranial hemorrhage:  None. Ventricular system: Mildly enlarged. Sulci mildly prominent.  Basal Cisterns:  Normal. Cerebral Parenchyma: Bilateral, symmetric periventricular areas of Instructions [id number]    Printed on:08/20/18 3450   Medication Information                      acetaminophen (TYLENOL) 325 MG tablet  Take 650 mg by mouth every 4 hours as needed for Pain or Fever (DO NOT EXCEED 4GM IN 24 HOURS)             amiodarone (CORDARONE) 200 MG tablet  Take 1 tablet by mouth every 8 hours             apixaban (ELIQUIS) 5 MG TABS tablet  Take 1 tablet by mouth 2 times daily             ascorbic acid (VITAMIN C) 500 MG tablet  Take 1,000 mg by mouth daily              digoxin (LANOXIN) 125 MCG tablet  Take 1 tablet by mouth every morning             ferrous sulfate 325 (65 FE) MG tablet  Take 325 mg by mouth 2 times daily             levothyroxine (LEVOTHROID) 50 MCG tablet  Take 1 tablet by mouth daily             metoprolol tartrate (LOPRESSOR) 50 MG tablet  Take 1 tablet by mouth 2 times daily             mometasone (ELOCON) 0.1 % cream  APPLY TOPICALLY DAILY. Disposition:   If discharged to Home, Any [place] [address] needs that were indicated and/or required as been addressed and set up by Social Work. Condition at discharge: Pt was medically stable at the time of discharge. Activity: activity as tolerated    Total time taken for discharging this patient: 40 minutes. Greater than 70% of time was spent focused exclusively on this patient. Time was taken to review chart, discuss plans with consultants, reconciling medications, discussing plan answering questions with patient.      Signed:  Soco Lee  8/20/2018, 4:17 PM

## 2018-08-20 NOTE — CARE COORDINATION
[facility] Transitions Initial Follow Up Call    Call within 2 business days of discharge: Yes    Patient: Estella Kapadia Patient : 1935   MRN: [id number]  Reason for Admission: -2018 [facility] Observation   HTN; Pre-syncope; AF  Discharge Date: 18 RARS: Readmission Risk Score: 12  CM 0  PHP Plan: [facility]  PCP: Ilir Santillan MD     Spoke with: Marcelino Vangie informs he is feeling pretty well. No further syncope events. States no home falls in well over a year. Denies any dizziness, SOB, chest pain/pressure, or lightheadedness. Having normal BMs. States he does not use Dulcolax, Protonix, or Midodrine medications and I did remove from med review. Pt also states he has not been on Levothyroxine for quite some time. 8/15/18 TSH 4.320. I did route this to PCP for clarification and request for refills to be sent for Metoprolol and Digoxin. Pt does have pacemaker. He states his home SBP range has been 110-120. He denies any med cost concerns or home needs. His 2 dtrs help him with transportation, meals, and clean-up. Reviewed symptoms to report to physician. Svetlana Walden . Med rec & 1111F  completed. START taking:  amiodarone (CORDARONE)  apixaban (ELIQUIS)   STOP taking:  clindamycin 150 MG capsule (CLEOCIN)  furosemide 40 MG tablet (LASIX)    Non-face-to-face services provided:  Obtained and reviewed discharge summary and/or continuity of care documents  Education of patient/family/caregiver/guardian to support self-management-Reviewed s/s AF.      Care Transitions 24 Hour Call    Schedule Follow Up Appointment with PCP:  Declined  Do you have any ongoing symptoms?:  No  Do you have a copy of your discharge instructions?:  Yes  Do you have all of your prescriptions and are they filled?:  Yes  Have you been contacted by a [facility] Pharmacist?:  Yes  Have you scheduled your follow up appointment?:  Yes  How are you going to get to your appointment?:  Car - family or friend to transport (Comment: Dtr takes to appts.)  Were you discharged with any Home Care or Post Acute Services:  No  Patient DME:  Straight cane  Do you have support at home?:  Alone  Do you feel like you have everything you need to keep you well at home?:  Yes  Are you an active caregiver in your home?:  No  Care Transitions Interventions         Follow Up  No future appointments.     Orlin Ellington, RN

## 2018-10-15 PROBLEM — R57.8 HEMORRHAGIC SHOCK (HCC): Status: ACTIVE | Noted: 2018-01-01

## 2018-10-15 PROBLEM — K92.2 GI HEMORRHAGE: Status: ACTIVE | Noted: 2018-01-01

## 2018-10-15 NOTE — CONSULTS
Chief Complaint   Patient presents with    Other     Found on floor on mattress. Last seen Thursday. Per squad, evidence of GI bleed in and around his toilet. Patient is a 82 y.o. male who presents with a chief complaint of fall. Patient is followed on a regular basis by Dr. Nadia Terrell MD. Patient was not seen for 3 days, found at home on the ground covered in blood. Found to be in Rhabdomyolysis, severe anemia with hgb of 4 and ARF. He was given Kcentra in ER and now receiving PRBC transfusions. He is already improving, HR down in to 90's from 130s. BP is stable, not on any pressors. Patient with hx of chronic afibb maintained on ELiquis. TAVR in 2016. Has mild trop elevation, . He is confused at the moment. EKG with Afibb, QTC is 561msec. On amiodarone, lopressor and digoxin. ECHO 8/18  Summary   Normal LV and RV.   Biatrial enlargement.   Severe PA hypertension.  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     Past Medical History:   Diagnosis Date    Aortic stenosis     Arthritis     Artificial cardiac pacemaker     [facility]rial [facility] [address]     Bradyarrhythmia 1/30/2012    Erectile dysfunction     Fall at home 2/2/2016    History of DVT (deep vein thrombosis)     Hyperlipidemia     Hypertension     Pilonidal cyst     [facility]     Rheumatic fever     rheumatic fever as a kid    Rotator cuff tear       Patient Active Problem List   Diagnosis    Hypertension    Hyperlipidemia    Rotator cuff tear    Pilonidal cyst    Erectile dysfunction    Vitamin B 12 deficiency    Subvalvular aortic stenosis    Bradyarrhythmia    D[facility]deep venous thrombosis) ([facility]    PAD (peripheral artery disease) ([address]    Aortic stenosis    Fall at home    Artificial cardiac pacemaker    Pre-syncope    GI hemorrhage    Hemorrhagic shock ([address]       Past Surgical History:   Procedure Laterality Date    CARDIAC CATHETERIZATION  1983    CARDIAC VALVE REPLACEMENT  2015    CATARACT REMOVAL      COLONOSCOPY  10/08/15    DR RAMIREZ    PACEMAKER PLACEMENT  12/15/15     DR. BARTLETT     UPPER GASTROINTESTINAL ENDOSCOPY  10/08/15    DR Columba Purcell       Social History     Social History    Marital status:       Spouse name: N/A    Number of children: N/A    Years of education: N/A     Social History Main Topics    Smoking status: Never Smoker    Smokeless tobacco: Never Used    Alcohol use No    Drug use: No    Sexual activity: Not Currently     Other Topics Concern    None     Social History Narrative    None       Family History   Problem Relation Age of Onset    Arthritis Mother     Lupus Sister     Cancer Brother         PANCREAS    Lupus Sister        Current Facility-Administered Medications   Medication Dose Route Frequency Provider Last Rate Last Dose    0.9 % sodium chloride bolus  250 mL Intravenous Once MARTHA Barros - CNP 62.5 mL/hr at 10/15/18 1505 250 mL at 10/15/18 1505    sodium chloride flush 0.9 % injection 10 mL  10 mL Intravenous 2 times per day Peyton Corona PA-C        sodium chloride flush 0.9 % injection 10 mL  10 mL Intravenous PRN Peyton Corona PA-C        acetaminophen (TYLENOL) tablet 650 mg  650 mg Oral Q4H PRN Peyton Corona PA-C        ondansetron (ZOFRAN) injection 4 mg  4 mg Intravenous Q6H PRN Peyton Corona PA-C        0.9 % sodium chloride infusion   Intravenous Continuous Carisa Cain MD        [facility]) nebulizer solution 1.25 mg  1.25 mg Nebulization Q8H PRN Mimi Oquendo PA-C        0.9 % sodium chloride bolus  250 mL Intravenous Once Carisa Cain MD        pantoprazole (PROTONIX) injection 40 mg  40 mg Intravenous Q12H Carisa Cain MD        And    sodium chloride (PF) 0.9 % injection 10 mL  10 mL Intravenous Daily Carisa Cain MD           ALLERGIES: Pcn [penicillins]    Review of Systems   Unable to perform ROS: Mental status change VITALS:  Blood pressure (!) 95/59, pulse 95, temperature 96.2 °F (35.7 °C), temperature source Temporal, resp. rate 28, height 5' 10\" (1.778 m), weight 145 lb (65.8 kg), SpO2 100 %. Body mass index is 20.81 kg/m². Physical Exam   Constitutional:   Chronically ill appearing. HENT:   Head: Normocephalic and atraumatic. Neck: Neck supple. No JVD present. No tracheal deviation present. No thyromegaly present. Cardiovascular: Normal rate, regular rhythm, normal heart sounds and intact distal pulses. PMI is not displaced. Exam reveals no gallop, no S3, no distant heart sounds and no friction rub. No murmur heard. Pulmonary/Chest: No respiratory distress. He has no wheezes. He has no rales. He exhibits no tenderness. Abdominal: Soft. Bowel sounds are normal. He exhibits no distension and no mass. There is no tenderness. There is no rebound and no guarding. Musculoskeletal: He exhibits no edema. Neurological: He is alert. No cranial nerve deficit. Skin: Skin is warm and dry. No rash noted. He is not diaphoretic. No erythema. No pallor. Psychiatric:   Confused.         LABS:  Recent Results (from the past 24 hour(s))   EKG 12 Lead    Collection Time: 10/15/18  1:56 PM   Result Value Ref Range    Ventricular Rate 77 BPM    Atrial Rate 76 BPM    QRS Duration 172 ms    Q-T Interval 496 ms    QTc Calculation (Bazett) 561 ms    R Axis -35 degrees    T Axis 148 degrees   CBC Auto Differential    Collection Time: 10/15/18  2:15 PM   Result Value Ref Range    WBC 12.6 (H) 4.8 - 10.8 K/uL    RBC 1.42 (L) 4.70 - 6.10 M/uL    Hemoglobin 4.7 (LL) 14.0 - 18.0 g/dL    Hematocrit 14.9 (LL) 42.0 - 52.0 %    .1 (H) 80.0 - 100.0 fL    MCH 33.1 (H) 27.0 - 31.3 pg    MCHC 31.5 (L) 33.0 - 37.0 %    RDW 15.5 (H) 11.5 - 14.5 %    Platelets 913 834 - 802 K/uL    Neutrophils % 90.4 %    Lymphocytes % 4.6 %    Monocytes % 4.9 %    Eosinophils % 0.0 %    Basophils % 0.1 %    Neutrophils # 11.4 (H) 1.4 - 6.5

## 2018-10-15 NOTE — ED NOTES
Pt now answering all questions and seems much more oriented than on last neuro check.       Chandler Cortez, RN  10/15/18 0885

## 2018-10-15 NOTE — ED NOTES
Hospitalist NAZ Sewell in with patient. Daughter back to bedside.      Chandler Cortez RN  10/15/18 6400

## 2018-10-15 NOTE — H&P
The patient is seen and examined independently. I agree with history and physical in impression and plan    The patient was last seen Thursday afternoon by family member. Daughter made several attempts to call him over the weekend but he did not ulcer. EMS responded and noted obvious evidence of GI bleeding over the bathroom. The patient was found lethargic but responsive    In ED, he was found to have hemoglobin of 4.7, blood pressure 87/48 heart rate 1:15 in atrial fibrillation with rapid ventricular response he has history of atrial fibrillation on eliquis. family thinks that the last pill he took was Friday or Thursday    Head and neck exam: Normocephalic/atraumatic very pale  Lungs: Clear anteriorly  Heart: S1 and S2 tachycardic irregular  Abdomen: Soft nontender. Rectal exam in the emergency room showed federica blood and positive for guaiac  Lower extremities: No edema  Neurologic: Awake disoriented focal neurological deficit at this moment    Impression:    . Hemorrhagic shock  . Acute GI bleed. In view of high BUN, suspect upper GI bleed. Patient denied nausea vomiting. .  Acute renal failure  . Rhabdomyolysis  . History of atrial fibrillation with RVR likely secondary to hypertension and acute anemia  . Leukocytosis. Likely reactive no signs of infection at this moment. .  Myoglobinuria  . Severe cervical degenerative disease. No findings of acute fractures or dislocation    Plan:  IV PPI  We give patient [facility] now. I already talked to the pharmacy  Give additional 2 units prbc. Additional 500 cc bolus  Monitor HH  Monitor kidney function and urine output  Monitor cpk  D/w dr. Drea Lovell. Will need to stabilize pt before endoscopy  Consult with nephrology  Prognosis is critical. Talked to daughter.  Pt is full code for now
calcium bile. There is no free peritoneal fluid or air. There is no abscess or mass in the abdominal cavity, retroperitoneum or pelvis. There is no bowel obstruction. There is no urinary tract obstruction. There is no biliary tree obstruction. The liver is unremarkable. The pancreas is unremarkable. The spleen is unremarkable. Adrenal glands and kidneys are unremarkable. The heavily calcified aorta is not dilated. Inferior vena cava is unremarkable. The stomach and small bowel are unremarkable. There is sigmoid diverticulosis without diverticulitis. There is no sign of appendicitis. Urinary bladder is slightly dilated. Prostate is small. There is no abdominal wall hernia. 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 There is nonacute anterior wedging of T11, unchanged since 2010. There is scarring at the lung bases, left more than right. There are signs of COPD. Incidentally noted is metal artifact associated with an endovascular aortic valve prosthesis. Incidentally noted is dense mitral annular calcification. NO ACUTE PROCESS COMPARED TO 2010. All CT scans at this facility use dose modulation, iterative reconstruction, and/or weight based dosing when appropriate to reduce radiation dose to as low as reasonably achievable. Xr Pelvis (1-2 Views)    Result Date: 10/15/2018  EXAMINATION: XR CHEST PORTABLE, XR PELVIS (1-2 VIEWS), 10/15/2018 2:00 PM History: 82-year-old male, found on floor COMPARISON:  December 16, 2015 FINDINGS: Chest-AP upright: Bilateral patchy densities in the lung bases. There are degenerative changes of the spine. The costophrenic angles are clear. The cardiac silhouette is normal in size. Valvular prosthesis is noted. Aortic arch calcification. The aortic arch appears enlarged. The pulmonary vascularity is normal size.  There are

## 2018-10-15 NOTE — ED PROVIDER NOTES
([facility]     Bradyarrhythmia 1/30/2012    Erectile dysfunction     Fall at home 2/2/2016    History of DVT (deep vein thrombosis)     Hyperlipidemia     Hypertension     Pilonidal cyst     [facility]VD [facility]peripheral vascular disease) ([facility])     Rheumatic fever     rheumatic fever as a kid    Rotator cuff tear      Past Surgical History:   Procedure Laterality Date    CARDIAC CATHETERIZATION  1983    CARDIAC VALVE REPLACEMENT      CATARACT REMOVAL      COLONOSCOPY  10/08/15    DR Sameer Navarro    PACEMAKER PLACEMENT  12/15/15     DR. BARTLETT     UPPER GASTROINTESTINAL ENDOSCOPY  10/08/15    DR Sameer Navarro     Social History     Social History    Marital status:      Spouse name: N/A    Number of children: N/A    Years of education: N/A     Social History Main Topics    Smoking status: Never Smoker    Smokeless tobacco: Never Used    Alcohol use No    Drug use: No    Sexual activity: Not Currently     Other Topics Concern    None     Social History Narrative    None       SCREENINGS    [place] Coma Scale  Eye Opening: Spontaneous  Best Verbal Response: Oriented  Best Motor Response: Obeys commands  [place] Coma Scale Score: 15 @FLOW([id number])@      PHYSICAL EXAM    (up to 7 for level 4, 8 or more for level 5)     ED Triage Vitals   BP Temp Temp Source Pulse Resp SpO2 Height Weight   10/15/18 1350 10/15/18 1350 10/15/18 1350 10/15/18 1350 10/15/18 1350 10/15/18 1450 -- --   (!) 93/45 96.2 °F (35.7 °C) Temporal 78 24 98 %         Physical Exam   Constitutional: He is oriented to person, place, and time. He appears lethargic. He appears cachectic. He appears toxic. He appears distressed. HENT:   Head: Normocephalic and atraumatic. Mouth/Throat: Mucous membranes are pale and dry. Neck: Trachea normal and phonation normal. Neck supple. No JVD present. No spinous process tenderness present. Carotid bruit is not present. Cardiovascular: Normal rate, normal heart sounds, intact distal pulses and normal pulses.   An and ordering, interpreting and reviewing diagnostic studies/lab tests. Aggregate critical care time includes only time during which I was engaged in work directly related to the patient's care, as described above, whether at the bedside or elsewhere in the Emergency Department. It did not include time spent performing other reported procedures. Critical Care: 67 minutes. CONSULTS:  IP CONSULT TO HOSPITALIST  IP CONSULT TO GI    PROCEDURES:  Unless otherwise noted below, none     Procedures    FINAL IMPRESSION      1. Gastrointestinal hemorrhage, unspecified gastrointestinal hemorrhage type    2. Acute renal failure, unspecified acute renal failure type ([facility]    3. Non-traumatic rhabdomyolysis    4. Elevated troponin    5. Hypothyroidism, unspecified type          DISPOSITION/PLAN   DISPOSITION Admitted 10/15/2018 04:39:36 PM      PATIENT REFERRED TO:  Gris Velazquez MD  [address] [place] [address]  [address] Tino Pratt MD  [address] [id number]            DISCHARGE MEDICATIONS:  New Prescriptions    No medications on file          (Please notethat portions of this note were completed with a voice recognition program.  Efforts were made to edit the dictations but occasionally words are mis-transcribed.)    MARTHA Null - CNP (electronically signed)  Attending Emergency Physician         MARTHA Null - [facility]  10/15/18 1651    Attending Supervisory Note/Shared Visit   I have personally performed a face to face diagnostic evaluation on this patient. I have reviewed the mid-levels findings and agree.   History and Exam by me shows GI hemorrhage with hypotension      Corrinne Grew, DO  Attending Emergency Physician         Corrinne Grew, DO  10/15/18 2833

## 2018-10-15 NOTE — ED NOTES
Preparing for transport with ICU RN and monitor. No change in patient status. No acute distress noted at this time.      Brian Davalos RN  10/15/18 4741

## 2018-10-16 PROBLEM — R77.8 ELEVATED TROPONIN: Status: ACTIVE | Noted: 2018-01-01

## 2018-10-16 PROBLEM — Z95.2 S/P TAVR (TRANSCATHETER AORTIC VALVE REPLACEMENT): Status: ACTIVE | Noted: 2018-01-01

## 2018-10-16 NOTE — CARE COORDINATION
LSW spoke to Pt and dtr, plan is [address] [place] [address]. Pt has been there 2 times before. Info was faxed to [facility]. Pt will need therapy for Precert. Today they do not have beds at [facility] but Pt is not ready yet.  Snf list was given to pick plan B..Electronically signed by XU Rosen on 10/16/2018 at 11:44 AM

## 2018-10-16 NOTE — CONSULTS
(LANOXIN) 125 MCG tablet Take 1 tablet by mouth every morning 30 tablet 0    mometasone (ELOCON) 0.1 % cream APPLY TOPICALLY DAILY. 50 g 1    levothyroxine (LEVOTHROID) 50 MCG tablet Take 1 tablet by mouth daily 90 tablet 3    ferrous sulfate 325 (65 FE) MG tablet Take 325 mg by mouth 2 times daily      ascorbic acid (VITAMIN C) 500 MG tablet Take 1,000 mg by mouth daily       acetaminophen (TYLENOL) 325 MG tablet Take 650 mg by mouth every 4 hours as needed for Pain or Fever (DO NOT EXCEED 4GM IN 24 HOURS)         Allergies:  Pcn [penicillins]    Social History:    Social History     Social History    Marital status:      Spouse name: N/A    Number of children: N/A    Years of education: N/A     Occupational History    Not on file. Social History Main Topics    Smoking status: Never Smoker    Smokeless tobacco: Never Used    Alcohol use No    Drug use: No    Sexual activity: Not Currently     Other Topics Concern    Not on file     Social History Narrative    No narrative on file       Family History:   Family History   Problem Relation Age of Onset    Arthritis Mother     Lupus Sister     Cancer Brother         PANCREAS    Lupus Sister        Review of Systems:   Review of Systems   HENT: Negative. Eyes: Negative. Respiratory: Negative. Cardiovascular: Negative. Gastrointestinal: Positive for abdominal pain and blood in stool. Genitourinary: Positive for difficulty urinating. Allergic/Immunologic: Negative. Neurological: Positive for weakness. Hematological: Negative. Psychiatric/Behavioral: Positive for confusion. Physical exam:   Constitutional:  VITALS:  BP (!) 112/56   Pulse 108   Temp 98.3 °F (36.8 °C) (Oral)   Resp 19   Ht 5' 10\" (1.778 m)   Wt 145 lb (65.8 kg)   SpO2 100%   BMI 20.81 kg/m²   Gen: alert, awake, nad  Skin: no rash, turgor wnl  Heent:  eomi, mmm  Neck: no bruits or jvd noted, thyroid normal  Lungs:  Normal expansion.   Clear pacemaker with a single lead. Monitoring leads project across the thorax. Pelvis-AP: There is spurring of the superior acetabular rim. Femoral heads are normal in contour and on this single AP view appears seated within the acetabula. No appreciable fracture. 1. Ill-defined patchy densities in the lung bases may represent atelectasis, infiltrate or pulmonary edema. 2. Enlarged aortic arch. 3. No appreciable acute osseous abnormality to the pelvis. Note: A nondisplaced sacral fracture may not be evident on radiograph. Ct Head Wo Contrast    Result Date: 10/15/2018  EXAMINATION: CT of the brain without contrast HISTORY: Patient found on floor on mattress. COMPARISON: CT brain from August 14, 2018 TECHNIQUE: Multiple contiguous axial images were obtained of the brain from the skull base through the vertex. Multiplanar reformats were obtained. FINDINGS: Prominence of the sulci and ventricles. Nonspecific bilateral supratentorial white matter hypoattenuation appears similar to prior examination. Gray-white matter differentiation is preserved. No acute hemorrhage or abnormal extra-axial fluid collection. Basal cisterns are patent. No mass effect or midline shift. The visualized paranasal sinuses and mastoid air cells are clear. Calvarium is intact. No acute intracranial process. 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 All CT scans at this facility use dose modulation, iterative reconstruction, and/or weight based dosing when appropriate to reduce radiation dose to as low as reasonably achievable. Ct Cervical Spine Wo Contrast    Result Date: 10/15/2018  EXAMINATION:  CT CERVICAL SPINE WO CONTRAST CLINICAL HISTORY:   trauma/pain COMPARISONS:  October 4, 2015 TECHNIQUE:  Spiral axial images of the cervical spine were obtained. Multiplanar two-dimensional reformatting was performed. FINDINGS:  There is advanced multilevel cervical spondylosis, with pronounced narrowing of intervertebral discs at all levels. There is fusion/ankylosis at C6-7. There is 3.6 mm anterolisthesis of C3 on C4. There is 3.5 mm anterolisthesis of C7 on T1. Spondylolisthesis is attributable to facet deformity associated with advanced apophyseal joint arthrosis, without fracture. Similar findings were present on October 4, 2015. There is advanced degenerative disease of the lateral axial articulation. There is no spinal canal stenosis. There is no sign of underlying pathology. Soft tissue window images reveal no evidence of disc herniation. SEVERE DEGENERATIVE DISEASE. UNCHANGED SPONDYLOLISTHESIS. NO SIGNIFICANT CHANGES SINCE OCTOBER 4, 2015 All CT scans at this facility use dose modulation, iterative reconstruction, and/or weight based dosing when appropriate to reduce radiation dose to as low as reasonably achievable. Xr Chest Portable    Result Date: 10/15/2018  EXAMINATION: XR CHEST PORTABLE, XR PELVIS (1-2 VIEWS), 10/15/2018 2:00 PM History: 29-year-old male, found on floor COMPARISON:  December 16, 2015 FINDINGS: Chest-AP upright: Bilateral patchy densities in the lung bases. There are degenerative changes of the spine. The costophrenic angles are clear. The cardiac silhouette is normal in size. Valvular prosthesis is noted. Aortic arch calcification. The aortic arch appears enlarged. The pulmonary vascularity is normal size. There are degenerative changes of the shoulders. There is a left chest wall pacemaker with a single lead. Monitoring leads project across the thorax. Pelvis-AP: There is spurring of the superior acetabular rim. Femoral heads are normal in contour and on this single AP view appears seated within the acetabula. No appreciable fracture. 1. Ill-defined patchy densities in the lung bases may represent atelectasis, infiltrate or pulmonary edema. 2. Enlarged aortic arch.  3. No appreciable

## 2018-10-16 NOTE — CARE COORDINATION
DAVEW spoke to Pt and dtr plan B would be Estrella Ramsey. .Electronically signed by XU Rendon on 10/16/2018 at 11:46 AM

## 2018-10-16 NOTE — PROGRESS NOTES
Physician Progress Note    10/16/2018   4:39 PM    Name:  Tonia Hunt  MRN:    [id number]      Day: 1     Admit Date: 10/15/2018  1:49 PM  PCP: Erica Koenig MD    Code Status:  Full Code    Subjective:      no new events. No dyspnea or chest pain. paln for EGD and colonoscopy in am, currently on clears. Physical Examination:      Vitals:  BP (!) 84/42   Pulse 97   Temp 98.4 °F (36.9 °C) (Oral)   Resp 23   Ht 5' 10\" (1.778 m)   Wt 145 lb (65.8 kg)   SpO2 100%   BMI 20.81 kg/m²   Temp (24hrs), Av.6 °F (36.4 °C), Min:96 °F (35.6 °C), Max:99.9 °F (37.7 °C)      General appearance: alert, cooperative and no distress  Mental Status: oriented to person, place and time and normal affect  Lungs: clear to auscultation bilaterally, normal effort  Heart: regular rate and rhythm, no murmur  Abdomen: soft, nontender, nondistended, bowel sounds present, no masses  Extremities: no edema, redness, tenderness in the calves  Skin: no gross lesions, rashes.  Pale     Data:     Labs:  Recent Labs      10/15/18   2312  10/16/18   0510  10/16/18   1143   WBC  8.6  7.4   --    HGB  7.0*  7.5*  7.6*   PLT  138  121*   --      Recent Labs      10/15/18   2312  10/16/18   0510   NA  155*  156*   K  4.2  4.2   CL  115*  120*   CO2  14*  14*   BUN  113*  108*   CREATININE  3.63*  3.28*   GLUCOSE  79  75     Recent Labs      10/15/18   1415  10/16/18   0510   AST  299*  308*   ALT  97*  116*   BILITOT  0.8  0.8   ALKPHOS  55  55       Current Facility-Administered Medications   Medication Dose Route Frequency Provider Last Rate Last Dose    dextrose 5 % and 0.2 % NaCl 1,000 mL infusion   Intravenous Continuous Cornelia Layne  mL/hr at 10/16/18 1040      sodium chloride flush 0.9 % injection 10 mL  10 mL Intravenous 2 times per day Will Curry PA-C   10 mL at 10/16/18 1021    sodium chloride flush 0.9 % injection 10 mL  10 mL Intravenous PRN Will Curry PA-C        acetaminophen (TYLENOL) tablet 650 mg 650 mg Oral Q4H PRN Rika Fields PA-C        ondansetron [facility]) injection 4 mg  4 mg Intravenous Q6H PRN Rika Fields PA-C        levalbuterol (XOPENEX) nebulizer solution 1.25 mg  1.25 mg Nebulization Q8H PRN Rika Fields PA-C        pantoprazole (PROTONIX) injection 40 mg  40 mg Intravenous Q12H Teressa López MD   40 mg at 10/16/18 0707    And    sodium chloride (PF) 0.9 % injection 10 mL  10 mL Intravenous Daily Teressa López MD   10 mL at 10/16/18 1021     Assessment and Plan:          Acute Hospital issues:    # upper GI bleed in the setting of Eliquis   # Hemorrhagic disorder due to Eliquis, present on admission, with GI bleed and acute blood loss anemia   - hold AC. Transfuse Hb<7.0  - monitor H/H closely   - EGD and colonoscopy in am   - CLD  - IV PPI     # elevated troponin   - due to above., appreciate cardiology input   - monitor on tele     # Rhabdomyolysis- traumatic, from being down on floor   - resume IVF    # afib   - hold AC due to above.   - resume rate control meds as per cardiology, tele     # s/p TAVR  - f/u outpatient with cardiology     DVT/PPx- ordered if appropriate        DISCHARGE PLANNING  ICU        Electronically signed by Omaira Rasmussen DO on 10/16/2018 at 4:39 PM

## 2018-10-17 NOTE — PROGRESS NOTES
Reviewed: Yes  Family / Caregiver Present: No    Restrictions:  Restrictions/Precautions: Fall Risk     SUBJECTIVE: Subjective: Pt admitted with GI bleed, low Hgb.   Pt very fatigued, but agreeable to participate in PT evaluation   Pre Treatment Pain Screening  Pain at present: 0  Intervention List: Patient able to continue with treatment    Post Treatment Pain Screening:   Pain Assessment  Pain Assessment: 0-10  Pain Level: 0    Prior Level of Function:  Social/Functional History  Lives With: Alone  Type of Home: House  Home Layout: One level  Bathroom Equipment: Grab bars in shower, Shower chair  Home Equipment: Rolling walker  Receives Help From: Family  ADL Assistance: Independent (per pt )  Homemaking Assistance: Independent (per pt, family assist as needed )  Homemaking Responsibilities: No  Ambulation Assistance: Independent (ww)  Transfer Assistance: Independent  Active : Yes (short distances as able )  Mode of Transportation: Car  Occupation: Retired  Leisure & Hobbies: watches TV  Additional Comments: questionable historian     OBJECTIVE:   Vision/Hearing:  Vision: Within Functional Limits  Hearing: Within functional limits    Cognition:  Overall Orientation Status: Within Functional Limits (lethargic, garbled speech )    Observation/Palpation  Observation: thin frame,  pallor, discoloration distal LEs     ROM:  RLE AROM: WFL  LLE AROM : WFL  RUE General AROM: decreased shldr   LUE General AROM: decreased shldr     Strength:  Strength RLE  Comment: 3+/5 grossly   Strength LLE  Comment: 3+/5 grossly   Strength RUE  Comment: 2/5 grossly   Strength LUE  Comment: 2+/5 grossly     Neuro:  Balance  Sitting - Static: Fair  Sitting - Dynamic: Poor  Standing - Static: Poor  Standing - Dynamic: Poor             Bed mobility  Rolling to Left: Moderate assistance  Rolling to Right: Moderate assistance  Supine to Sit: Maximum assistance  Sit to Supine: Maximum assistance    Transfers  Sit to Stand: Maximum around  Mobility: Walking and Moving Around Current Status (): At least 80 percent but less than 100 percent impaired, limited or restricted  Mobility: Walking and Moving Around Goal Status ():  At least 40 percent but less than 60 percent impaired, limited or restricted    Goals:  Patient goals : return home   Short term goals  Short term goal 1: Improve strength and balance to safely achieve goals   Long term goals  Long term goal 1: Bed mob with SBA  Long term goal 2: Transfers with CGA  Long term goal 3: Ambulate >/= 25' with ww with CGA     [facility] (6 CLICK) Trinh Dyer 28 Inpatient Mobility Raw Score : 9     Therapy Time:   Individual   Time In 1138   Time Out 1201   Minutes 23   Timed Code Treatment Minutes: 0 Minutes       Brody Patterson, PT, 10/17/18 at 12:42 PM

## 2018-10-17 NOTE — PROGRESS NOTES
[facility] OCCUPATIONAL THERAPY EVALUATION - ACUTE   Room: A664/V766-90  Date: 10/17/2018  Patient Name: Chemo Montague        MRN: [id number]  Account: [de-identified]   : 1935  (82 y.o.)    Chart Review:  Diagnosis:  The primary encounter diagnosis was Gastrointestinal hemorrhage, unspecified gastrointestinal hemorrhage type. Diagnoses of Acute renal failure, unspecified acute renal failure type [facility]), Non-traumatic rhabdomyolysis, Elevated troponin, and Hypothyroidism, unspecified type were also pertinent to this visit. Past Medical History:   Diagnosis Date    Aortic stenosis     Arthritis     Artificial cardiac pacemaker     Atrial fibrillation ([facility]     Bradyarrhythmia 2012    Erectile dysfunction     Fall at home 2016    History of DVT (deep vein thrombosis)     Hyperlipidemia     Hypertension     Pilonidal cyst     PVD (peripheral vascular disease) ([facility]     Rheumatic fever     rheumatic fever as a kid    Rotator cuff tear      Past Surgical History:   Procedure Laterality Date    CARDIAC CATHETERIZATION      CARDIAC VALVE REPLACEMENT      CATARACT REMOVAL      COLONOSCOPY  10/08/15    DR Jay Hoang    PACEMAKER PLACEMENT  12/15/15     DR. BARTLETT     UPPER GASTROINTESTINAL ENDOSCOPY  10/08/15    DR Jay Hoang     Precautions:  falls       Evaluation and Pt. rights have been reviewed: [x]Yes   [] No   If no why not:   Falls safety interventions in place  []Yes   [] No    Comments:     Subjective: \"I usually take care of myself\"    Prior living arrangement:    Support contact: family  Pt lives: [x] Alone   [] With spouse   [] Other   Comment:    Home: [x] Single level   []  Two level   []  Split level     []  Apartment:     Entrance:  Stairs: 4 Hand rails 1,    Inside: Stairs: 0 Hand rails: 0  Bathroom: [] Bath tub   [] Tub/Shower combo   []  Shower stall    Location: main level    DME: [x] W/W   [] Cane   [] Rollator   []  W/C   [x] Grab Bars   [x] Shower Chair   [] [facility]  []

## 2018-10-17 NOTE — PROGRESS NOTES
PRBC. For endoscopy today. Rhabdomyolysis. - better. Chronic Afibb- on ELiquis  SSS s/p PPM  Prolonged QTC- 586ms. ARF- improving. Hx of TAVR  Elevated TSH- ? Related to amio  Hx of HTN  Hx of PAD  Severe PHTN         Plan     1. [id number] for endoscopy today  2. Cont to hold anticoagulation pending GI recs  3. No QT prolonging meds  4. Monitor on tele. 5. No nephrotoxic agents  6. GI/DVT proph  7. Will follow  8. Cons. Med rx from cardio standpoint.         Electronically signed by Faith Hoang DO on 10/17/18 at 2:55 PM

## 2018-10-18 NOTE — PROGRESS NOTES
elevated myocardial infarction) ([facility] [I21.4]      Priority: High    S/P TAVR (transcatheter aortic valve replacement) [Z95.2] 10/16/2018    Elevated troponin [R74.8] 10/16/2018    GI hemorrhage [K92.2] 10/15/2018    Hemorrhagic shock ([facility] [R57.8] 10/15/2018    Permanent atrial fibrillation (HCC) [I48.2]          Echo 8/18    - Penicillins.   Conclusions   Summary   Normal LV and RV.   Biatrial enlargement.   Severe PA hypertension.  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   Signature    Nstemi type 2 secondary to anemia  Rhabdomyolysis-IV fluids  Anemia secondary bleed to NOAC's/ GI workup today  PAF-NSR LBBB  Hx TAVR  Hx PPM  CONOLONOSCOPY TODAY  Cardiology to continue follow along          Attending Supervising [de-identified] Attestation Statement  The patient is a 82 y.o. male. I have performed a history and physical examination of the patient. I discussed the case with the nurse practitioner. I reviewed the patient's Past Medical History, Past Surgical History, Medications, and Allergies. Physical Exam:  Vitals:    10/18/18 1730 10/18/18 1735 10/18/18 1740 10/18/18 1745   BP: (!) 90/53 (!) 88/54  (!) 89/54   Pulse: 102 100 94 103   Resp: 17 18 16 17   Temp: 99.5 °F (37.5 °C)      TempSrc: Temporal      SpO2: 99% 99% 98% 100%   Weight:       Height:             Pulmonary/Chest: clear to auscultation bilaterally- no wheezes, rales or rhonchi, normal air movement, no respiratory distress and rhonchi present-  Cardiovascular: irregular.  normal rate, normal S1 and S2, no gallops, intact distal pulses and no carotid bruits  Abdomen: soft, non-tender, non-distended, normal bowel sounds, no masses or organomegaly    Active Hospital Problems    Diagnosis Date Noted    Chronic atrial fibrillation (HCC) [I48.2]      Priority: High    NSTEMI (non-ST elevated myocardial infarction) ([facility] [I21.4]      Priority: High    Adenomatous polyp of sigmoid colon [D12.5]      Priority: Low    S/P TAVR (transcatheter

## 2018-10-18 NOTE — ANESTHESIA PRE PROCEDURE
Results   Component Value Date     10/18/2018    K 4.0 10/18/2018    K 4.2 10/16/2018     10/18/2018    CO2 27 10/18/2018    BUN 28 10/18/2018    CREATININE 0.86 10/18/2018    GFRAA >60.0 10/18/2018    AGRATIO 1.4 08/13/2018    LABGLOM >60.0 10/18/2018    GLUCOSE 116 10/18/2018    GLUCOSE 104 08/13/2018    PROT 5.4 10/16/2018    CALCIUM 6.9 10/18/2018    BILITOT 0.8 10/16/2018    ALKPHOS 55 10/16/2018     10/16/2018     10/16/2018       POC Tests: No results for input(s): POCGLU, POCNA, POCK, POCCL, POCBUN, POCHEMO, POCHCT in the last 72 hours. Coags:   Lab Results   Component Value Date    PROTIME 16.3 10/15/2018    INR 1.6 10/15/2018    APTT 33.8 12/11/2015       HCG (If Applicable): No results found for: PREGTESTUR, PREGSERUM, HCG, HCGQUANT     ABGs:   Lab Results   Component Value Date    PHART 7.355 05/21/2015    PO2ART 51 05/21/2015    GCU0RPM 42 05/21/2015    GYZ7LIA 23.3 05/21/2015    BEART -2 05/21/2015    D7ICACFN 84 05/21/2015        Type & Screen (If Applicable):  No results found for: [facility]    Anesthesia Evaluation  Patient summary reviewed and Nursing notes reviewed no history of anesthetic complications:   Airway: Mallampati: II  TM distance: >3 FB   Neck ROM: full  Mouth opening: > = 3 FB Dental: normal exam         Pulmonary:Negative Pulmonary ROS                              Cardiovascular:Negative CV ROS    (+) hypertension:, past MI:, CAD:,       ECG reviewed               Beta Blocker:  Not on Beta Blocker         Neuro/Psych:                ROS comment: Cognitive deficit, uncertain etiology. GI/Hepatic/Renal:   (+) renal disease: ARF,          ROS comment: Acute GI bleed. Endo/Other:    (+) blood dyscrasia: anemia, thrombocytopenia and anticoagulation therapy:., .                 Abdominal:           Vascular: negative vascular ROS. Anesthesia Plan      MAC     ASA 4       Induction: intravenous.       Plan/risks

## 2018-10-18 NOTE — PROGRESS NOTES
K  3.9  3.9  4.0   CL  125*  120*  112*   CO2  22  24  27   BUN  58*  44*  28*   CREATININE  1.47*  1.09  0.86   CALCIUM  7.3*  7.2*  6.9*     Recent Labs      10/15/18   1415  10/16/18   0510   AST  299*  308*   ALT  97*  116*   BILITOT  0.8  0.8   ALKPHOS  55  55     Recent Labs      10/15/18   1415   INR  1.6     Recent Labs      10/15/18   1415  10/15/18   1940  10/15/18   2312  10/16/18   0510  10/17/18   0631  10/18/18   0639   CKTOTAL  9,172*   --    --   8,752*  5,942*  3,198*   TROPONINI  0.036*  0.032*  0.035*   --    --    --        Urinalysis:    Lab Results   Component Value Date    NITRU Negative 10/15/2018    WBCUA 0-2 10/15/2018    BACTERIA Rare 10/15/2018    RBCUA 20-50 10/15/2018    BLOODU LARGE 10/15/2018    SPECGRAV 1.019 10/15/2018    GLUCOSEU Negative 10/15/2018       Radiology:  CT ABDOMEN PELVIS WO CONTRAST Additional Contrast? None   Final Result   NO ACUTE PROCESS COMPARED TO 2010. All CT scans at this facility use dose modulation, iterative reconstruction, and/or weight based dosing when appropriate to reduce radiation dose to as low as reasonably achievable. XR CHEST PORTABLE   Final Result   1. Ill-defined patchy densities in the lung bases may represent atelectasis, infiltrate or pulmonary edema. 2. Enlarged aortic arch. 3. No appreciable acute osseous abnormality to the pelvis. Note: A nondisplaced sacral fracture may not be evident on radiograph. XR PELVIS (1-2 VIEWS)   Final Result   1. Ill-defined patchy densities in the lung bases may represent atelectasis, infiltrate or pulmonary edema. 2. Enlarged aortic arch. 3. No appreciable acute osseous abnormality to the pelvis. Note: A nondisplaced sacral fracture may not be evident on radiograph. CT Cervical Spine WO Contrast   Final Result   SEVERE DEGENERATIVE DISEASE. UNCHANGED SPONDYLOLISTHESIS.  NO SIGNIFICANT CHANGES SINCE OCTOBER 4, 2015         All CT scans at this facility use dose

## 2018-10-19 NOTE — PROGRESS NOTES
Physical Therapy   Facility/Department: [facility] MED SURG IC16/IC16-01    NAME: Ariana Ag    : 1935 (82 y.o.)  MRN: [id number]    Account: [de-identified]  Gender: male    Hold PT treatment on this date d/t patient had a change in medical status. Pt moved from [address]. Change in medical status discussed with supervising PT on this date. Please re-consult physical therapy when appropriate. Note written to attending hospitalist  via \"sticky note\" requesting updated Physical Therapy eval and treat orders when pt is appropriate for out of bed activity.       [address] [place] [address]) Physical Therapy Department      Electronically signed by Sharyn Leventhal, PTA on 10/19/18 at 10:54 AM

## 2018-10-19 NOTE — PROGRESS NOTES
esterase  - CXR  - continue ceftriaxone.      Elevated troponin   - 2/2 type 2 NSTEMI  - cardiology following     SHIRA  - prerenal 2/2 acute blood loss  - improving  - nephrology following     Hypernatremia  - improving with rehydration.      Rhabdomyolysis  - CK down trending     Atrial fibrillation  - holding anticoagulation     S/p TAVR    Diet: Diet NPO, After Midnight    Code Status: Full Code    Electronically signed by Yang Jeffries MD on 10/19/2018 at 8:57 AM

## 2018-10-19 NOTE — PROGRESS NOTES
4596   10/19/18   0405  10/19/18   0848   WBC  7.1   --   6.8   --    HGB  7.5*  7.5*   < >  6.8*  7.1*   PLT  59*   --   55*   --     < > = values in this interval not displayed. CMP:  Recent Labs      10/18/18   0639  10/18/18   1945  10/19/18   0537   NA  146*  142  143   K  4.0  3.6  3.3*   CL  112*  108*  109*   CO2  27  23  22   BUN  28*  23  21   CREATININE  0.86  0.95  0.92   GLUCOSE  116*  106  97   CALCIUM  6.9*  6.8*  6.4*   LABGLOM  >60.0  >60.0  >60.0     Troponin: No results for input(s): TROPONINI in the last 72 hours. BNP: No results for input(s): BNP in the last 72 hours. INR: No results for input(s): INR in the last 72 hours. Lipids: No results for input(s): CHOL, LDLDIRECT, TRIG, HDL, AMYLASE, LIPASE in the last 72 hours. Liver: No results for input(s): AST, ALT, ALKPHOS, PROT, LABALBU, BILITOT in the last 72 hours. Invalid input(s): BILDIR  Iron:  No results for input(s): IRONS, FERRITIN in the last 72 hours. Invalid input(s): LABIRONS  Urinalysis: No results for input(s): UA in the last 72 hours.     Objective:  Vitals: /76   Pulse 106   Temp 98.6 °F (37 °C) (Oral)   Resp 27   Ht 5' 10\" (1.778 m)   Wt 148 lb 5.9 oz (67.3 kg)   SpO2 100%   BMI 21.29 kg/m²    Wt Readings from Last 3 Encounters:   10/17/18 148 lb 5.9 oz (67.3 kg)   08/14/18 160 lb 12.8 oz (72.9 kg)   08/10/16 171 lb (77.6 kg)      24HR INTAKE/OUTPUT:    Intake/Output Summary (Last 24 hours) at 10/19/18 1539  Last data filed at 10/18/18 1812   Gross per 24 hour   Intake          2942.25 ml   Output              150 ml   Net          2792.25 ml     General: awake, in no apparent distress  HEENT: normocephalic, atraumatic, anicteric, dry mucous membranes  Neck: supple, no mass  Lungs: non-labored respirations, clear to auscultation bilaterally  Heart: regular rate and rhythm, no murmurs or rubs  Abdomen: soft, non-tender, non-distended  Ext: no cyanosis, no peripheral edema  Neuro: awake, unintelligible speech  Skin: no rash      Electronically signed by Lopez Montiel MD

## 2018-10-19 NOTE — PROGRESS NOTES
Per Dr. Cody Beckford, he would like patient transferred to ICU. Transfer initiated. X-ray called and informed of transfer. Daughter Kurtis Hyde called, and updated. No further questions at this time. Patient received bedbath before transfer. ICU nurse Jorge Taveras given report.

## 2018-10-19 NOTE — PROGRESS NOTES
Physical Therapy Missed Treatment   Facility/Department: [facility] MED SURG IC16/IC16-01    NAME: Gama Ferreira  Patient Status:   : 1935 (82 y.o.)  MRN: [id number]  Account: [de-identified]  Gender: male        [] Patient Declines PT Treatment            [x] Patient Unavailable: Will attempt PT Treatment again at earliest convenience. Pt has had change in medical status and was moved from [place] to [facility].        Electronically signed by Serge Cardenas PTA on 10/19/18 at 9:21 AM

## 2018-10-19 NOTE — PROGRESS NOTES
Occupational Therapy  Facility/Department: [facility] MED SURG IC16/IC16-01  Interdisciplinary Communication Note      To the referring provider,     While we thank you for your referral, Kt Barcenas was seen for an evaluation an:     [] This patient is of observation status and requires a diagnosis supportive of OT intervention in order to proceed. Please re-order at your convenience if OT is warranted. [] The patient refused skilled OT intervention, denying a need. [x] The patient has had a change in status and/or is not medically stable to participate. Please re-order at your convenience if OT is warranted. [] The patient was observed as IND in the room and does not require skilled services. [] The patient was observed as DEP; skilled services would not improve the patient's functional status. Thank you,    Electronically signed by OJSE ARMANDO Calzada on 10/19/18 at 10:05 AM    [facility] EMERGENCY [facility] AT [facility] Department.

## 2018-10-20 NOTE — PROGRESS NOTES
replacement)    Elevated troponin    Adenomatous polyp of sigmoid colon    Shock ([facility] [address]    Hypotension due to hypovolemia  Resolved Problems:    * No resolved hospital problems. *    Blood pressure (!) 100/47, pulse 66, temperature 98.1 °F (36.7 °C), temperature source Oral, resp. rate (!) 35, height 5' 10\" (1.778 m), weight 148 lb 5.9 oz (67.3 kg), SpO2 96 %. Subjective:  Symptoms:  He reports malaise and weakness. No shortness of breath or chest pain. Diet:  No nausea. Activity level: Impaired due to weakness. Pain:  He reports no pain. Objective:  General Appearance:  Ill-appearing and not in pain. Vital signs: (most recent): Blood pressure (!) 122/42, pulse 68, temperature 97.8 °F (36.6 °C), temperature source Oral, resp. rate 16, height 5' 10\" (1.778 m), weight 148 lb 5.9 oz (67.3 kg), SpO2 96 %. Output: Producing urine and producing stool. HEENT: Normal HEENT exam.    Lungs:  Normal effort and normal respiratory rate. Breath sounds clear to auscultation. He is not in respiratory distress. Heart: Normal rate. S1 normal and S2 normal.    Abdomen: Abdomen is soft. There is no abdominal tenderness. Pulses: Distal pulses are intact. Neurological: Patient is alert and oriented to person, place and time. Skin:  Warm and dry. Assessment & Plan  1. Acute GI bleed with severe anemia and hypotention s/p EGD/colonoscopy. Continue hb monitoring  Continue ringer's lactate infusion  With vasopressor  Evaluate for sepsis  Transfuse if hb <7.0    2.  Rhabdomyolysis  Continue ringer;s lactatte        Prateek Marcial MD  10/20/2018

## 2018-10-20 NOTE — PROGRESS NOTES
CARDIAC VALVE REPLACEMENT  2015    CATARACT REMOVAL      COLONOSCOPY  10/08/15    DR Vianca Beebe    PACEMAKER PLACEMENT  12/15/15     DR. BARTLETT     [place] COLON CA SCRN NOT  [address] IND N/A 10/17/2018    COLONOSCOPY ROOM performed by Ana Laura Hayes MD at [address] IND N/A 10/18/2018    COLONOSCOPY, ROOM 491 performed by Ana Laura Hayes MD at [address] [facility] EGD TRANSORAL BIOPSY SINGLE/MULTIPLE N/A 10/17/2018    EGD performed by Ana Laura Hayes MD at [address]  10/08/15    DR Vianca Beebe       Chart Reviewed: Yes  Patient assessed for rehabilitation services?: Yes  Family / Caregiver Present: No    Restrictions:  Restrictions/Precautions: Fall Risk     SUBJECTIVE: Subjective: Pt lethargic.  Pleasantly confused and agreeable to PT eval. pt denies pain  Response To Previous Treatment: Not applicable  Pre Treatment Pain Screening  Pain at present: 0  Scale Used: Numeric Score  Intervention List: Patient able to continue with treatment    Post Treatment Pain Screening:   Pain Screening  Patient Currently in Pain: No  Pain Assessment  Pain Assessment: 0-10  Pain Level: 0    Prior Level of Function:  Social/Functional History  Lives With: Daughter  Type of Home: House  Home Layout: One level  Home Access: Stairs to enter with rails  Entrance Stairs - Number of Steps: 4  Entrance Stairs - Rails: Both  Bathroom Equipment: Grab bars in shower, Shower chair  Home Equipment: Rolling walker  Receives Help From: Family  ADL Assistance: Independent (per pt )  Homemaking Assistance: Independent (per pt, family assist as needed )  Homemaking Responsibilities: No  Ambulation Assistance: Independent (ww)  Transfer Assistance: Independent  Active : Yes (short distances as able )  Mode of Transportation: Car  Occupation: Retired  Leisure & Hobbies: watches TV  Additional Comments: questionable historian     OBJECTIVE:   Vision/Hearing:  Vision: Within Functional Limits  Hearing: Within

## 2018-10-20 NOTE — PROGRESS NOTES
Progress Note  Patient: Ruel Rai  Unit/Bed: ZG96/UC56-75  YOB: 1935  MRN: [id number]  Acct: [de-identified]   Admitting Diagnosis: GI hemorrhage [K92.2]  Hemorrhagic shock ([facility] [address] [R57.8]  Admit Date:  10/15/2018  Hospital Day: 5    Chief Complaint: GIB Anemia AF PPM    Histories:  Past Medical History:   Diagnosis Date    Aortic stenosis     Arthritis     Artificial cardiac pacemaker     [facility]rial fibrillation ([facility]     Bradyarrhythmia 1/30/2012    Erectile dysfunction     Fall at home 2/2/2016    History of DVT (deep vein thrombosis)     Hyperlipidemia     Hypertension     Pilonidal cyst     PVD (peripheral vascular disease) ([facility]     Rheumatic fever     rheumatic fever as a kid    Rotator cuff tear      Past Surgical History:   Procedure Laterality Date    CARDIAC CATHETERIZATION  1983    CARDIAC VALVE REPLACEMENT  2015    CATARACT REMOVAL      COLONOSCOPY  10/08/15    DR Naye Puga    PACEMAKER PLACEMENT  12/15/15     DR. BARTLETT     [place] COLON CA SCRN NOT HI RSK IND N/A 10/17/2018    COLONOSCOPY ROOM performed by Dionicio Gary MD at [address] [facility] [address] IND N/A 10/18/2018    COLONOSCOPY, ROOM 491 performed by Dionicio Gary MD at [address] [place] [address] EGD TRANSORAL BIOPSY SINGLE/MULTIPLE N/A 10/17/2018    EGD performed by Dionicio Gary MD at [address] [facility] ENDOSCOPY  10/08/15    DR Naye Puga     Family History   Problem Relation Age of Onset    Arthritis Mother     Lupus Sister     Cancer Brother         PANCREAS    Lupus Sister      Social History     Social History    Marital status:       Spouse name: N/A    Number of children: N/A    Years of education: N/A     Social History Main Topics    Smoking status: Never Smoker    Smokeless tobacco: Never Used    Alcohol use No    Drug use: No    Sexual activity: Not Currently     Other Topics Concern    None     Social History Narrative    None       Subjective/HPI:  Low grade fever last pm. Awake

## 2018-10-20 NOTE — PROGRESS NOTES
[facility] Occupational Therapy      Date: 10/20/2018  Patient Name: Tonia Hunt        MRN: [id number]  Account: [de-identified]   : 1935  (82 y.o.)  Room: [place] [address]    Chart reviewed, attempted OT at 3:45 for eval. Patient unavailable 2° to:    [] Hold per nsg request    [x] Pt lethargic, unable to keep eyes open with multiple attempt to engage with pt.     [] Pt. . off floor for test/procedure. Will attempt again when able.     Electronically signed by JOSE ARMANDO Tapia on 10/20/2018 at 3:45 PM

## 2018-10-21 NOTE — PROGRESS NOTES
is visually estimated at 60%. Assessment: This is a critically ill patient at risk of deterioration / death , needing close ICU monitoring and intervention due to below noted problems     · Shock likely hypovolemic, patient responded to fluid. 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 This time is excluding time spent performing procedures.          Electronically signed by Wong Goncalves MD,  [facility] ,on 10/21/2018 at 11:18 AM

## 2018-10-22 NOTE — PROGRESS NOTES
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 Multiple diverticula noted on colonoscopy which is most likel;y source of bleeding. H and H has been stable. - Enterococcal UTI: On Vancomycin. Will change to oral Macrobid starting tomorrow    - Septic shock: From Enterococcal UTI . On vancomycin. OFf Levophed. Will monitor today. IF continues to be off pressors, will transfer to [facility] tomorrow    - d/c IVF. Encourage PO intake    - permanent a fib: Was on NOAC prior to admission. C held. Rate controlled    - Patient has been afebrile and blood cultures have been negative. No fred for now    DVT prophylaxis: SCD    On protonix IV.  Switch to oral    Jj Palmer MD  Pager : [phone]

## 2018-10-22 NOTE — PROGRESS NOTES
Progress Note  Patient: Valorie Higginbotham  Unit/Bed: LX95/JX93-91  YOB: 1935  MRN: [id number]  Acct: [de-identified]   Admitting Diagnosis: GI hemorrhage [K92.2]  Hemorrhagic shock ([facility] [address] [R57.8]  Admit Date:  10/15/2018  Hospital Day: 7    Chief Complaint: gib anemia AF PPM    Histories:  Past Medical History:   Diagnosis Date    Aortic stenosis     Arthritis     Artificial cardiac pacemaker     Atrial fibrillation ([facility] [address]     Bradyarrhythmia 1/30/2012    Erectile dysfunction     Fall at home 2/2/2016    History of DVT (deep vein thrombosis)     Hyperlipidemia     Hypertension     Pilonidal cyst     [facility]     Rheumatic fever     rheumatic fever as a kid    Rotator cuff tear      Past Surgical History:   Procedure Laterality Date    CARDIAC CATHETERIZATION  1983    CARDIAC VALVE REPLACEMENT  2015    CATARACT REMOVAL      COLONOSCOPY  10/08/15    DR Pamela Hopkins    PACEMAKER PLACEMENT  12/15/15     DR. BARTLETT     [place] COLON CA SCRN NOT HI RSK IND N/A 10/17/2018    COLONOSCOPY ROOM performed by Bisi Muhammad MD at [address] [facility] [address] IND N/A 10/18/2018    COLONOSCOPY, ROOM 491 performed by Bisi Muhammad MD at [address] [place] [address] EGD TRANSORAL BIOPSY SINGLE/MULTIPLE N/A 10/17/2018    EGD performed by Bisi Muhammad MD at [address] [facility] ENDOSCOPY  10/08/15    DR Pamela Hopkins     Family History   Problem Relation Age of Onset    Arthritis Mother     Lupus Sister     Cancer Brother         PANCREAS    Lupus Sister      Social History     Social History    Marital status:       Spouse name: N/A    Number of children: N/A    Years of education: N/A     Social History Main Topics    Smoking status: Never Smoker    Smokeless tobacco: Never Used    Alcohol use No    Drug use: No    Sexual activity: Not Currently     Other Topics Concern    None     Social History Narrative    None     10/22/18  Patient awake and alert, confused at 10/19/2018    PLT 55 10/19/2018    MPV 9.2 08/13/2018     CBC with Differential:    Lab Results   Component Value Date    WBC 6.8 10/19/2018    RBC 2.15 10/19/2018    RBC 3.69 08/13/2018    HGB 8.3 10/21/2018    HCT 25.2 10/21/2018    PLT 55 10/19/2018    MCV 97.3 10/19/2018    MCH 31.5 10/19/2018    MCHC 32.4 10/19/2018    RDW 18.0 10/19/2018    NRBC 1 10/18/2018    BANDSPCT 2 10/18/2018    LYMPHOPCT 12.9 10/19/2018    MONOPCT 9.7 10/19/2018    BASOPCT 2.7 10/19/2018    MONOSABS 0.7 10/19/2018    LYMPHSABS 0.9 10/19/2018    EOSABS 0.1 10/19/2018    BASOSABS 0.2 10/19/2018     CMP:    Lab Results   Component Value Date     10/21/2018    K 3.7 10/21/2018    K 4.2 10/16/2018     10/21/2018    CO2 23 10/21/2018    BUN 17 10/21/2018    CREATININE 0.78 10/21/2018    GFRAA >60.0 10/21/2018    AGRATIO 1.4 08/13/2018    LABGLOM >60.0 10/21/2018    GLUCOSE 115 10/21/2018    GLUCOSE 104 08/13/2018    PROT 5.6 10/19/2018    LABALBU 2.9 10/19/2018    LABALBU 4.1 08/13/2018    CALCIUM 7.5 10/21/2018    BILITOT 0.8 10/19/2018    ALKPHOS 70 10/19/2018     10/19/2018     10/19/2018     BMP:    Lab Results   Component Value Date     10/21/2018    K 3.7 10/21/2018    K 4.2 10/16/2018     10/21/2018    CO2 23 10/21/2018    BUN 17 10/21/2018    LABALBU 2.9 10/19/2018    LABALBU 4.1 08/13/2018    CREATININE 0.78 10/21/2018    CALCIUM 7.5 10/21/2018    GFRAA >60.0 10/21/2018    LABGLOM >60.0 10/21/2018    GLUCOSE 115 10/21/2018    GLUCOSE 104 08/13/2018     Magnesium:    Lab Results   Component Value Date    MG 1.8 10/21/2018     Troponin:    Lab Results   Component Value Date    TROPONINI 0.035 10/15/2018        Active Hospital Problems    Diagnosis Date Noted    Chronic atrial fibrillation (HCC) [I48.2]      Priority: High    NSTEMI (non-ST elevated myocardial infarction) ([facility] [address] [I21.4]      Priority: High    Hypotension due to hypovolemia [I95.89, E86.1]     Shock ([facility] [address] [R57.9]     Adenomatous

## 2018-10-22 NOTE — PROGRESS NOTES
Pulmonary & Critical Care Medicine ICU Progress Note  Chief complaint : low BP     Subjunctive/24 hour events :   Patient seen and examined during multidisciplinary rounds with RN, charge nurse, RT, pharmacy, dietitian, and social service. Patient is awake, denies chest pain or shortness of breath, he responded to fluid boluses yesterday current nightly 0.013 my current per kilogram per minute of Levophed. No fever, he uses BiPAP from 8.1 a.m. and requested to be taken off, he is on DVT prophylaxis [SCD], and on PUD prophylaxis. No active bleed, no bowel movement. He is tolerating oral diet well, and blood sugar is controlled, good urine output. Social History   Substance Use Topics    Smoking status: Never Smoker    Smokeless tobacco: Never Used    Alcohol use No     Family History   Problem Relation Age of Onset    Arthritis Mother     Lupus Sister     Cancer Brother         PANCREAS    Lupus Sister        No results for input(s): PHART, JJF9GBP, PO2ART in the last 67 hours. MV Settings:    Rate Set: 12 bmp/ / /FiO2 : 35 %           IV:   lactated ringers 100 mL/hr at 10/22/18 0340    norepinephrine Stopped (10/22/18 1045)       Vitals:  BP (!) 147/62   Pulse 67   Temp 98.5 °F (36.9 °C) (Oral)   Resp 28   Ht 5' 10\" (1.778 m)   Wt 158 lb (71.7 kg)   SpO2 94%   BMI 22.67 kg/m²    Tmax:        Intake/Output Summary (Last 24 hours) at 10/22/18 1221  Last data filed at 10/22/18 0915   Gross per 24 hour   Intake          3118.66 ml   Output              950 ml   Net          2168.66 ml       EXAM:  General: alert, cooperative, no distress,  Head: normocephalic, atraumatic  Eyes:No gross abnormalities.   ENT:  MMM no lesions  Neck:  supple and no masses  Chest : Few basal rales, no wheezing, nontender, tympanic  ABD:  symmetric, liver span normal to percussion, soft, non-tender, spleen non-palpable  Musculoskeletal : no cyanosis, no clubbing and no edema  Neuro:  Grossly normal  Skin: No

## 2018-10-23 NOTE — CARE COORDINATION
Per morning rounds, RN reports patient febrile this morning @ 101.4, increased O2 needs, increased WBC, Levo off yesterday and back on today, CXR worse. Continue to follow for [facility] once medically stable. Cultures sent, antibiotic changes. Lasix given today.

## 2018-10-23 NOTE — PROGRESS NOTES
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 ·  watch volume status, Avoid volume overload  · starting subcutaneous heparin for DVT prophylaxis if okay with GI      Due to the immediate potential for life-threatening deterioration due to shock , I spent 36  minutes providing critical care. This time is excluding time spent performing procedures.          Electronically signed by Maynor Mckeon MD,  [facility] ,on 10/23/2018 at 12:35 PM

## 2018-10-23 NOTE — PROGRESS NOTES
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 Multiple diverticula noted on colonoscopy which is most likel;y source of bleeding. H and H has been stable.     - Enterococcal UTI: On Vancomycin.      - Septic shock: From Enterococcal UTI . On vancomycin. OFf Levophed. Started having high grade fever today with elevated WBC. Repeat blood cultures and CXR. Started on cipro in addition to vancomycin. LESLIE if continues to have high grade fever     - d/c IVF. recieved dose of lasix today.     - permanent a fib: Was on NOAC prior to admission. AC held due to GI bleed.  Rate controlled       DVT prophylaxis: SCD     protonix    DVT prophylaxis: Lovenox  Yelitza Zimmerman MD  Pager : [phone]

## 2018-10-23 NOTE — PROGRESS NOTES
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 Fever- pan culture. If persistent will consider LESLIE (TAVR)  2. Anemia- transfused.  Keep Hb >8  3. Still on low dose   4. BP still on low side   5. SHIRA iproved  6. F/u Echo LVEF 60% Unremarkable RV  7. TAVR normal FXN  8. Persistent AF- rate control. Hold OAC due to GIB  9. History of prolonged QTc-  Mg 1.8 K 3.7.  emperic Magoxide. QTc 565. Again reviewed meds list- no obvious culprit noted.      Electronically signed by Bal Parsons MD on 10/23/2018 at 9:57 AM

## 2018-10-24 NOTE — PROGRESS NOTES
Pulmonary & Critical Care Medicine ICU Progress Note  Chief complaint : Sepsis    Subjunctive/24 hour events :   Patient seen and examined during multidisciplinary rounds with RN, charge nurse, RT, pharmacy, dietitian, and social service. Patient is awake and alert, had the temperature of 101.4 yesterday today morning 99.1, denies chest pain, no shortness of breath, he is off pressors as of this morning, no abdominal pain, no active bleed, no nausea or vomiting. No skin rash, no joint swelling. Social History   Substance Use Topics    Smoking status: Never Smoker    Smokeless tobacco: Never Used    Alcohol use No     Family History   Problem Relation Age of Onset    Arthritis Mother     Lupus Sister     Cancer Brother         PANCREAS    Lupus Sister        Recent Labs      10/23/18   1537   PHART  7.474*   EIV4WMI  33*   PO2ART  48*       MV Settings:    Rate Set: 12 bmp/Vt Ordered: 511 mL/ /FiO2 : 40 %           IV:   norepinephrine 0.96 mcg/min (10/24/18 1150)       Vitals:  BP (!) 106/41   Pulse 68   Temp 99.1 °F (37.3 °C) (Axillary)   Resp 25   Ht 5' 10\" (1.778 m)   Wt 164 lb 14.5 oz (74.8 kg)   SpO2 90%   BMI 23.66 kg/m²    Tmax:        Intake/Output Summary (Last 24 hours) at 10/24/18 1227  Last data filed at 10/24/18 0600   Gross per 24 hour   Intake              925 ml   Output              600 ml   Net              325 ml       EXAM:  General: alert, cooperative, no distress,  Head: normocephalic, atraumatic  Eyes:No gross abnormalities. ENT:  MMM no lesions  Neck:  supple and no masses  Chest : Bilateral rales up to mid chest, no wheezing, good air movement, nontender, tympanic  ABD:  symmetric, liver span normal to percussion, soft, non-tender, spleen non-palpable  Musculoskeletal : no cyanosis, no clubbing and no edema  Neuro:  Grossly normal  Skin: No rashes or nodules noted.   Lymph node:  no cervical nodes  Urology: Yes Junior   Psychiatric: appropriate    Medications:  Scheduled Meds:   meropenem  900 mg Intravenous Once    meropenem  1 g Intravenous Q8H    pantoprazole  40 mg Intravenous BID    And    sodium chloride (PF)  10 mL Intravenous BID    heparin (porcine)  5,000 Units Subcutaneous 3 times per day    magnesium oxide  400 mg Oral Daily    docusate sodium  100 mg Oral BID    sodium chloride  250 mL Intravenous Once    midodrine  10 mg Oral TID WC    sodium chloride flush  10 mL Intravenous 2 times per day    levalbuterol  1.25 mg Nebulization TID    sodium chloride flush  10 mL Intravenous 2 times per day       PRN Meds:  acetaminophen, sodium chloride flush, levalbuterol, sodium chloride flush, acetaminophen, ondansetron    Results: reviewed by me   CBC:   Recent Labs      10/23/18   0555  10/23/18   0745  10/23/18   1537  10/24/18   0600   WBC  10.5  12.2*   --   14.0*   HGB  7.0*  7.8*  8.0*  7.9*   HCT  21.6*  23.9*   --   23.8*   MCV  97.3  95.0   --   95.0   PLT  71*  75*   --   82*     BMP:   Recent Labs      10/23/18   0554  10/23/18   0745  10/23/18   1537  10/24/18   0600   NA  126*  142   --   143   K  3.1*  3.5   --   3.6   CL  92*  105   --   106   CO2  21*  24   --   24   BUN  16  18   --   24*   CREATININE  0.76  0.80  1.4*  1.03     LIVER PROFILE:   No results for input(s): AST, ALT, LIPASE, BILIDIR, BILITOT, ALKPHOS in the last 72 hours. Invalid input(s): AMYLASE,  ALB  PT/INR: No results for input(s): PROTIME, INR in the last 72 hours. APTT: No results for input(s): APTT in the last 72 hours. UA:  No results for input(s): NITRITE, COLORU, PHUR, LABCAST, WBCUA, RBCUA, MUCUS, TRICHOMONAS, YEAST, BACTERIA, CLARITYU, SPECGRAV, LEUKOCYTESUR, UROBILINOGEN, BILIRUBINUR, BLOODU, GLUCOSEU, AMORPHOUS in the last 72 hours.     Invalid input(s): Kim Grimaldo    Cultures:  Enterococcus in the urine 75,000 CFU  Repeat cultures so far are negative  Films:  Chest x-ray from yesterday shows congestion    Echo   Mild (1+) mitral regurgitation is present.   S/P TAVR. Trace AI   Normal tricuspid valve structure and function.   Mild tricuspid regurgitation.   Normal pulmonic valve structure and function.   Moderately dilated left atrium.   Left ventricular ejection fraction is visually estimated at 60%. Assessment: This is a critically ill patient at risk of deterioration / death , needing close ICU monitoring and intervention due to below noted problems     · Shock etiology resolved, however patient remains at risk, his low by and 10 to drop his blood pressure during the day. · Volume overload currently asymptomatic  · Left lower lobe atelectasis, pro-calcitonin is slightly up on repeat study  · Enterococcus bacteriuria, patient on ciprofloxacin  · GI bleed while on a Eliquis, none since stopping anticoagulation  · Pulmonary hypertension owing to left heart disease, slightly volume overloaded   · Constipation    Recommendation  · Resumed Levophed target mean arterial pressure 65  · Date acid and progressive tone and pro-calcitonin  · CT chest abdomen and pelvis today  · Broader antibiotic coverage for now  · Follow-up repeat blood culture results  · Continue Midodrin,  · Ambulate  · Watch for ICU delirium: TV on, natural light, avoid benzos, pain control, early mobility, and family engagement  · Received Lasix one time today, continue to monitor urine output and renal function  ·  watch volume status, Avoid volume overload  · DVT prophylaxis    DW Dr Niel Garcia    Due to the immediate potential for life-threatening deterioration due to sepsis , I spent 39  minutes providing critical care. This time is excluding time spent performing procedures.          Electronically signed by Turner Berrios MD,  [facility] ,on 10/24/2018 at 12:27 PM

## 2018-10-24 NOTE — PROGRESS NOTES
Department of Internal Medicine  Progress Note      SUBJECTIVE: Patient is borderline hypotensive. On minimal Levophed. Has been having hypoxia with sats dropping to 85-86% while in 4-5 L NC. Had low grade fever overnight with up trending white cell count. Patient himself is AO x1. No acute events overnight.        ROS:  All 12 systems reviewed and negative except mentioned in HPI and Assessment and plan    MEDICATIONS:  Current Facility-Administered Medications   Medication Dose Route Frequency Provider Last Rate Last Dose    meropenem (MERREM) 1 g in sodium chloride 0.9 % 100 mL IVPB (mini-bag)  1 g Intravenous Q8H Chucky Enrique MD        pantoprazole (PROTONIX) injection 40 mg  40 mg Intravenous BID Consuelo Castellano MD   40 mg at 10/23/18 2232    And    sodium chloride (PF) 0.9 % injection 10 mL  10 mL Intravenous BID Consuelo Castellano MD   10 mL at 10/23/18 2247    heparin (porcine) injection 5,000 Units  5,000 Units Subcutaneous 3 times per day Antoine Kathleen MD   Stopped at 10/23/18 2256    magnesium oxide (MAG-OX) tablet 400 mg  400 mg Oral Daily Liyah Son MD   400 mg at 10/23/18 0816    docusate sodium (COLACE) capsule 100 mg  100 mg Oral BID Antoine Kathleen MD   100 mg at 10/23/18 0816    norepinephrine (LEVOPHED) 16 mg in dextrose 5 % 250 mL infusion  2 mcg/min Intravenous Continuous Caseyaf MD Eulogio 0.5 mL/hr at 10/24/18 0526 0.5 mcg/min at 10/24/18 0526    acetaminophen (TYLENOL) suppository 650 mg  650 mg Rectal Q4H PRN Verito Polk MD   650 mg at 10/19/18 0245    0.9 % sodium chloride bolus  250 mL Intravenous Once Verito Polk MD        midodrine (PROAMATINE) tablet 10 mg  10 mg Oral TID  Antoine Kathleen MD   10 mg at 10/23/18 1604    sodium chloride flush 0.9 % injection 10 mL  10 mL Intravenous 2 times per day Antoine Kathleen MD   10 mL at 10/23/18 0820    sodium chloride flush 0.9 % injection 10 mL  10 mL Intravenous PRN Antoine Kathleen MD        levalbuterol [facility])

## 2018-10-24 NOTE — PROGRESS NOTES
CATHETERIZATION  1983    CARDIAC VALVE REPLACEMENT  2015    CATARACT REMOVAL      COLONOSCOPY  10/08/15    DR Doherty [facility]    PACEMAKER PLACEMENT  12/15/15     DR. BARTLETT     SD COLON CA SCRN NOT  [address] IND N/A 10/17/2018    COLONOSCOPY ROOM performed by Flo Wilson MD at [address] IND N/A 10/18/2018    COLONOSCOPY, ROOM 491 performed by Flo Wilson MD at [address] [facility] EGD TRANSORAL BIOPSY SINGLE/MULTIPLE N/A 10/17/2018    EGD performed by Flo Wilson MD at [address]  10/08/15    DR Doherty [facility]          Restrictions:  Restrictions/Precautions: Fall Risk    SUBJECTIVE:  General  Chart Reviewed: Yes  Family / Caregiver Present: No  Subjective  Subjective: I'm ready to try. General Comment  Comments: Pt. confused at times, and lethargic. Pre Pain Assessment:  Pre Treatment Pain Screening  Pain at present: 0  Pain Screening  Patient Currently in Pain: No       Post Pain Assessment:   Pain Assessment  Pain Assessment: 0-10  Pain Level: 0       OBJECTIVE:         Bed mobility  Rolling to Left: Maximum assistance  Rolling to Right: Maximum assistance  Supine to Sit:  (n/t safety concern)  Comment: vc's and hand over hand assist needed to roll. Pt. too lethargic and confused to attempt sitting EOB. Exercises  Quad Sets: x 20   Heelslides: AAROM x10  Gluteal Sets: x 20   Ankle Pumps: x15                     ASSESSMENT:  Body structures, Functions, Activity limitations: Decreased functional mobility ; Decreased ROM; Decreased strength;Decreased balance;Decreased coordination    Assessment: Pt. lethargic and confused throughout tx. during rolling, pt. desat to 81% then recovered to 95% after a few minutes.      Activity Tolerance  Activity Tolerance: Patient limited by fatigue;Patient limited by endurance  Activity Tolerance: SOB with exersion pt desat to 81% then recovered to 95% after a few minutes       Discharge Recommendations:  Continue to

## 2018-10-24 NOTE — PROGRESS NOTES
[facility]  Speech Language/Pathology  Dysphagia Therapy Note    Fritz Fulling  10/24/2018    Medical Dx: GI hemorrhage [K92.2]  Hemorrhagic shock ([address] [R57.8]      Time in: 1158  Time out: 1210    Pt being seen for : [x] Dysphagia exercises  [] Oral Motor Exercises             [x] Therapeutic meal monitor  [] Other:    Subjective:  Behavior: [x] Alert  [x] Cooperative  [x]  Pleasant  [] Confused  [] Agitated      [] Uncooperative  [] Distractible [] Motivated  [] Self-Limiting [] Anxious         [] Other:    Endurance:  [x] Adequate for participation in SLP sessions  [x] Reduced overall              [] Lethargic  [] Other:    Objective/Assessment:     Goal 1: Pt will tolerate current diet without overt s/s of aspiration. Pt tolerated bites of puree without overt s/s of aspiration in all opportunities. Pt presented with min-mod residuals throughout the oral cavity following each bite which mostly cleared with cued use of liquid wash. Pt tolerated sips of nectar thick liquid via cup in 5/8 opportunities without overt s/s of aspiration. In the remaining opportunities, pt presented with a wet vocal quality that cleared with a cued double swallow. Discussed with NADEEN Stahl. Goal 2: Pt will tolerate trials of mechanical soft with adequate mastication and oral bolus clearance in 5/5 trials without overt s/s of aspiration. Not appropriate at this time. Goal 3: Pt will tolerate trials of thin liquid in 5/5 trials without overt s/s of aspiration. Not appropriate at this time. Goal 4: Pt will complete oral motor ROM and strengthening exercises with 80% accuracy, given cues as needed, in order to strengthen lingual/labial/buccal musculature to promote safety and efficiency of oral phase of swallow and decrease risk for pocketing.    Not addressed  Goal 5: Pt will complete pharyngeal strengthening exercises (such as the Lisa, Effortful swallow, etc) with 80% acc in order to strengthen and

## 2018-10-25 NOTE — CARE COORDINATION
Pt has seen Pt, LSW paged OT to see Pt. LSW called [facility] and spoke to Sera Chang to have them look at Pt. Precert needed. Dillon Shepherd Electronically signed by XU Cifuentes on 10/25/2018 at 9:10 AM

## 2018-10-25 NOTE — PROGRESS NOTES
catheter was shown to aspirate and infuse properly. The flange of the catheter was affixed to the arm using a PICC securement device. A spot image of the chest showed the tip of the PICC line to lie in the superior vena cava. The patient tolerated the procedure well and without complications. Number of films: 1 Fluoroscopy time: 43.9 seconds CONCLUSION: SUCCESSFUL RUE PICC PLACEMENT WITHOUT IMMEDIATE COMPLICATIONS. Ir Ultrasound Guidance Vascular Access    Result Date: 10/19/2018  PERIPHERALLY INSERTED CENTRAL CATHETER (PICC) PLACEMENT WITH ULTRASOUND GUIDANCE CLINICAL HISTORY:  ICU PATIENT WITH GI BLEED AND HYPOTENSION, PATIENT NEEDS CENTRAL VENOUS ACCESS FOR IV ACCESS AND BLOOD TRANSFUSION, PHYSICIAN REQUESTS PLACEMENT OF PICC LINE After discussing the procedure and possible complications with the patient, informed consent was obtained. The patient was placed on the Special Procedures table. The right upper extremity was sterilely prepared using 2% chlorhexidine. Central venous catheter was inserted using a maximal sterile barrier technique which includes cap, mask, sterile gown, sterile gloves, sterile full-body drape, hand hygiene and 2% chlorhexidine for cutaneous antisepsis. A sterile ultrasound technique with sterile gel and sterile probe covers was also utilized. A pre-procedure time out was performed in order to assure the correct patient and procedure. Local anesthetic was administered. Utilizing sterile gel and sterile probe covers, a peripheral vein was accessed with sonographic guidance. A sonographic spot image was obtained for documentation. A guidewire was advanced into the vein with fluoroscopic guidance and a sheath was placed over the guidewire. A 5-Telugu dual-lumen PICC was advanced through the sheath, up the arm and into the central vasculature. It was positioned appropriately. The sheath was removed. The catheter was shown to aspirate and infuse properly.   The flange of

## 2018-10-25 NOTE — PROGRESS NOTES
Department of Internal Medicine  Progress Note      SUBJECTIVE: Patient is borderline hypotensive. On minimal Levophed. Has been having hypoxia with sats dropping to 85-86% while in 4-5 L NC. Had low grade fever early in AM with up stable white cell count. Patient himself is AO x1. No acute events overnight.        ROS:  All 12 systems reviewed and negative except mentioned in HPI and Assessment and plan    MEDICATIONS:  Current Facility-Administered Medications   Medication Dose Route Frequency Provider Last Rate Last Dose    meropenem (MERREM) 1 g in sodium chloride 0.9 % 100 mL IVPB (mini-bag)  1 g Intravenous Q8H Chucky Erickson MD   Stopped at 10/25/18 0346    pantoprazole (PROTONIX) injection 40 mg  40 mg Intravenous BID Mirza Acosta MD   40 mg at 10/24/18 2107    And    sodium chloride (PF) 0.9 % injection 10 mL  10 mL Intravenous BID Mirza Acosta MD   10 mL at 10/24/18 2110    heparin (porcine) injection 5,000 Units  5,000 Units Subcutaneous 3 times per day Surya Fuller MD   5,000 Units at 10/25/18 0646    magnesium oxide (MAG-OX) tablet 400 mg  400 mg Oral Daily Brina Esparza MD   400 mg at 10/24/18 0934    docusate sodium (COLACE) capsule 100 mg  100 mg Oral BID Surya Fuller MD   100 mg at 10/24/18 2105    norepinephrine (LEVOPHED) 16 mg in dextrose 5 % 250 mL infusion  2 mcg/min Intravenous Continuous Surya Fuller MD 0.9 mL/hr at 10/24/18 1150 0.96 mcg/min at 10/24/18 1150    acetaminophen (TYLENOL) suppository 650 mg  650 mg Rectal Q4H PRN Fauzia Tobin MD   650 mg at 10/19/18 0245    0.9 % sodium chloride bolus  250 mL Intravenous Once Fauzia Tobin MD        midodrine (PROAMATINE) tablet 10 mg  10 mg Oral TID WC Surya Fuller MD   10 mg at 10/24/18 2105    sodium chloride flush 0.9 % injection 10 mL  10 mL Intravenous 2 times per day Surya Fulelr MD   10 mL at 10/24/18 2020    sodium chloride flush 0.9 % injection 10 mL  10 mL Intravenous PRN MD Tavon Verduzco

## 2018-10-25 NOTE — PROGRESS NOTES
Progress Note  Patient: Mattie Zayas  Unit/Bed: CQ59/FP77-22  YOB: 1935  MRN: [id number]  Acct: [de-identified]   Admitting Diagnosis: GI hemorrhage [K92.2]  Hemorrhagic shock ([address] [R57.8]  Admit Date:  10/15/2018  Hospital Day: 8    Chief Complaint:gib anemia af ppm Fever    Histories:  Past Medical History:   Diagnosis Date    Aortic stenosis     Arthritis     Artificial cardiac pacemaker     Atrial fibrillation ([address]     Bradyarrhythmia 1/30/2012    Erectile dysfunction     Fall at home 2/2/2016    History of DVT (deep vein thrombosis)     Hyperlipidemia     Hypertension     Pilonidal cyst     PVD (peripheral vascular disease) (HCC)     Rheumatic fever     rheumatic fever as a kid    Rotator cuff tear      Past Surgical History:   Procedure Laterality Date    CARDIAC CATHETERIZATION  1983    CARDIAC VALVE REPLACEMENT  2015    CATARACT REMOVAL      COLONOSCOPY  10/08/15    DR Glen Stack    PACEMAKER PLACEMENT  12/15/15     DR. BARTLETT     MT COLON CA SCRN NOT [facility] IND N/A 10/17/2018    COLONOSCOPY ROOM performed by Zachariah Mueller MD at [address] [facility] [address] IND N/A 10/18/2018    COLONOSCOPY, ROOM 491 performed by Zachariah Mueller MD at [address] [facility] EGD TRANSORAL BIOPSY SINGLE/MULTIPLE N/A 10/17/2018    EGD performed by Zachariah Mueller MD at [address] [facility] ENDOSCOPY  10/08/15    DR Glen Stack     Family History   Problem Relation Age of Onset    Arthritis Mother     Lupus Sister     Cancer Brother         PANCREAS    Lupus Sister      Social History     Social History    Marital status:       Spouse name: N/A    Number of children: N/A    Years of education: N/A     Social History Main Topics    Smoking status: Never Smoker    Smokeless tobacco: Never Used    Alcohol use No    Drug use: No    Sexual activity: Not Currently     Other Topics Concern    None     Social History Narrative    None     10/24/18  Patient awake and alert  Low grade fever   Vital signs stable    10/25/18  Patient awake and alert  Still low grade fever    EKG: Paced with AF        Review of Systems:   Review of Systems   Constitutional: Positive for fever. Negative for diaphoresis and fatigue. HENT: Negative. Eyes: Negative. Respiratory: Positive for shortness of breath. Negative for cough, chest tightness, wheezing and stridor. Cardiovascular: Negative. Negative for chest pain, palpitations and leg swelling. Gastrointestinal: Negative. Negative for blood in stool and nausea. Genitourinary: Negative. Musculoskeletal: Negative. Skin: Negative. Neurological: Positive for weakness. Negative for dizziness, syncope and light-headedness. Hematological: Negative. Psychiatric/Behavioral: Negative. Physical Examination:    BP (!) 122/40   Pulse 83   Temp 99.4 °F (37.4 °C) (Axillary)   Resp 26   Ht 5' 10\" (1.778 m)   Wt 164 lb 14.5 oz (74.8 kg)   SpO2 95%   BMI 23.66 kg/m²    Physical Exam   Constitutional: No distress. He appears acutely ill. HENT:   Normal cephalic and Atraumatic   Eyes: Pupils are equal, round, and reactive to light. Neck: Normal range of motion and thyroid normal. Neck supple. No JVD present. No neck adenopathy. No thyromegaly present. Cardiovascular: Normal rate, regular rhythm, intact distal pulses and normal pulses. Murmur heard. Pulmonary/Chest: Effort normal. He has no wheezes. He has bibasilar rales. He exhibits no tenderness. Abdominal: Soft. Bowel sounds are normal. There is no tenderness. Musculoskeletal: Normal range of motion. He exhibits edema (1+ LE 1-2 + UE (R)). He exhibits no tenderness. Neurological: He is alert and oriented to person, place, and time. Skin: Skin is warm. No cyanosis. Nails show no clubbing.        LABS:  CBC:   Lab Results   Component Value Date    WBC 13.3 10/25/2018    RBC 2.56 10/25/2018    RBC 3.69 08/13/2018    HGB 8.0 10/25/2018    HCT 24.2 10/25/2018    MCV 94.6 10/25/2018    MCH 31.1 10/25/2018    MCHC 32.9 10/25/2018    RDW 17.4 10/25/2018    PLT 88 10/25/2018    MPV 9.2 08/13/2018     CBC with Differential:    Lab Results   Component Value Date    WBC 13.3 10/25/2018    RBC 2.56 10/25/2018    RBC 3.69 08/13/2018    HGB 8.0 10/25/2018    HCT 24.2 10/25/2018    PLT 88 10/25/2018    MCV 94.6 10/25/2018    MCH 31.1 10/25/2018    MCHC 32.9 10/25/2018    RDW 17.4 10/25/2018    NRBC 1 10/18/2018    BANDSPCT 2 10/18/2018    LYMPHOPCT 8.4 10/23/2018    MONOPCT 4.6 10/23/2018    BASOPCT 0.1 10/23/2018    MONOSABS 0.5 10/23/2018    LYMPHSABS 0.9 10/23/2018    EOSABS 0.0 10/23/2018    BASOSABS 0.0 10/23/2018     CMP:    Lab Results   Component Value Date     10/25/2018    K 3.7 10/25/2018    K 3.5 10/23/2018     10/25/2018    CO2 25 10/25/2018    BUN 27 10/25/2018    CREATININE 1.04 10/25/2018    GFRAA >60.0 10/25/2018    AGRATIO 1.4 08/13/2018    LABGLOM >60.0 10/25/2018    GLUCOSE 96 10/25/2018    GLUCOSE 104 08/13/2018    PROT 5.6 10/19/2018    LABALBU 2.9 10/19/2018    LABALBU 4.1 08/13/2018    CALCIUM 7.7 10/25/2018    BILITOT 0.8 10/19/2018    ALKPHOS 70 10/19/2018     10/19/2018     10/19/2018     BMP:    Lab Results   Component Value Date     10/25/2018    K 3.7 10/25/2018    K 3.5 10/23/2018     10/25/2018    CO2 25 10/25/2018    BUN 27 10/25/2018    LABALBU 2.9 10/19/2018    LABALBU 4.1 08/13/2018    CREATININE 1.04 10/25/2018    CALCIUM 7.7 10/25/2018    GFRAA >60.0 10/25/2018    LABGLOM >60.0 10/25/2018    GLUCOSE 96 10/25/2018    GLUCOSE 104 08/13/2018     Magnesium:    Lab Results   Component Value Date    MG 2.0 10/23/2018     Troponin:    Lab Results   Component Value Date    TROPONINI 0.035 10/15/2018        Active Hospital Problems    Diagnosis Date Noted    Chronic atrial fibrillation [facility]) [I48.2]      Priority: High    NSTEMI (non-ST elevated myocardial infarction) ([facility] [I21.4]      Priority: High    Sepsis ([facility] [A41.9]

## 2018-10-26 NOTE — PROGRESS NOTES
fever   Vital signs stable    10/25/18  Patient awake and alert  Still low grade fever    10/26/18  Patient awake and alert, confused at times  Shortness of breath at rest  No fever today  Vital signs stable    EKG: Paced with AF        Review of Systems:   Review of Systems   Constitutional: Positive for fever. Negative for diaphoresis and fatigue. HENT: Negative. Eyes: Negative. Respiratory: Positive for shortness of breath. Negative for cough, chest tightness, wheezing and stridor. Cardiovascular: Negative. Negative for chest pain, palpitations and leg swelling. Gastrointestinal: Negative. Negative for blood in stool and nausea. Genitourinary: Negative. Musculoskeletal: Negative. Skin: Negative. Neurological: Positive for weakness. Negative for dizziness, syncope and light-headedness. Hematological: Negative. Psychiatric/Behavioral: Negative. Physical Examination:    /87   Pulse 112   Temp 97.1 °F (36.2 °C) (Temporal)   Resp 26   Ht 5' 10\" (1.778 m)   Wt 162 lb 0.6 oz (73.5 kg)   SpO2 92%   BMI 23.25 kg/m²    Physical Exam   Constitutional: No distress. He appears acutely ill. HENT:   Normal cephalic and Atraumatic   Eyes: Pupils are equal, round, and reactive to light. Neck: Normal range of motion and thyroid normal. Neck supple. No JVD present. No neck adenopathy. No thyromegaly present. Cardiovascular: Normal rate, regular rhythm, intact distal pulses and normal pulses. Murmur heard. Pulmonary/Chest: Effort normal. He has no wheezes. He has bibasilar rales. He exhibits no tenderness. Abdominal: Soft. Bowel sounds are normal. There is no tenderness. Musculoskeletal: Normal range of motion. He exhibits edema (1+ LE 1-2 + UE (R)). He exhibits no tenderness. Neurological: He is alert and oriented to person, place, and time. Skin: Skin is warm. No cyanosis. Nails show no clubbing.        LABS:  CBC:   Lab Results   Component Value Date    WBC Chronic atrial fibrillation (HCC) [I48.2]      Priority: High    NSTEMI (non-ST elevated myocardial infarction) ([facility] [I21.4]      Priority: High    Acute interstitial pneumonitis (HCC) [V67.521]     Acute respiratory failure with hypoxia (HCC) [J96.01]     Sepsis ([facility] [A41.9]     Hypotension due to hypovolemia [I95.89, E86.1]     Shock ([facility] [R57.9]     Adenomatous polyp of sigmoid colon [D12.5]     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 I reviewed and agree with the findings and plan as documented in his note . History element: 82year old male with TAVR, diastolic dysfunction, afib rate controlled, presented for GIB, now with respiratory failure. Physical element: Mildly distressed, no edema, lungs course, abdomen soft  Plan element: Suspect sepsis/ARDS/pneumonia not CHF. Appears clinically dry. Ed Giles MD [facility] Director of Cardiology Services and Cardiac Catheterization Laboratory  [facility], [facility]

## 2018-10-26 NOTE — PROGRESS NOTES
Nutrition Assessment    Type and Reason for Visit: Reassess    Nutrition Recommendations:Continue current diet, Continue current ONS (DYS 1 with NTL. Magic cup x2)    Malnutrition Assessment:  · Malnutrition Status: At risk for malnutrition  · Context: Acute illness or injury  · Findings of the 6 clinical characteristics of malnutrition (Minimum of 2 out of 6 clinical characteristics is required to make the diagnosis of moderate or severe Protein Calorie Malnutrition based on AND/ASPEN Guidelines):  1. Energy Intake-51% to 75%, greater than 7 days    2. Weight Loss-No significant weight loss,    3. Fat Loss-No significant subcutaneous fat loss,    4. Muscle Loss-Moderate muscle mass loss, Clavicles (pectoralis and deltoids)  5. Fluid Accumulation-No significant fluid accumulation,    6.  Strength-Normal (handshake firm)    Nutrition Diagnosis:   · Problem: Predicted suboptimal energy intake  · Etiology: related to Difficulty swallowing     Signs and symptoms:  as evidenced by Swallow study results    Nutrition Assessment:  · Subjective Assessment: Required bipap yesterday, had minial po intake, back on High Flow O2 today, looks better, tolerating po diet, working with speech tx at time of visit.  'Trying to eat' and likes the Magic Cup  · Nutrition-Focused Physical Findings: 1+ pitting edema bilater upper and lower extremities per nursing, I/O = 1143 ( 740 po)/925, last BM 10/18 per nursing, alert and oriented  · Wound Type: None  · Current Nutrition Therapies:  · Oral Diet Orders: Dysphagia 1 (Pureed), Nectar Thick   · Oral Diet intake: 51-75%  · Oral Nutrition Supplement (ONS) Orders: Frozen Oral Supplement (x2)  · ONS intake: 51-75%  · Anthropometric Measures:  · Ht: 5' 10\" (177.8 cm)   · Current Body Wt: 162 lb (73.5 kg) (10-26)  · Admission Body Wt: 158 lb (71.7 kg)  · Usual Body Wt: 160 lb (72.6 kg) ((8/18), pt states 160-165# ' he thinks')  · % Weight Change: 0,     · Ideal Body Wt: 166 lb (75.3 kg),

## 2018-10-26 NOTE — DISCHARGE INSTR - COC
Continuity of Care Form    Patient Name: Aster Vincent   :  1935  MRN:  [id number]    Admit date:  10/15/2018  Discharge date:  ***    Code Status Order: Full Code   Advance Directives:   [facility] Documentation     Date/Time Healthcare Directive Type of Healthcare Directive Copy in [address] Agent's Name Healthcare Agent's Phone Number    10/15/18 2018  Yes, patient has an advance directive for healthcare treatment  Living will;Durable power of  for health care; Health care treatment directive  No, copy requested from family  Adult Children  --  --          Admitting Physician:  No admitting provider for patient encounter. PCP: Ember Vang MD    Discharging Nurse: [facility] Unit/Room#: IC16/IC16-01  Discharging Unit Phone Number: ***    Emergency Contact:   Extended Emergency Contact Information  Primary Emergency Contact: [address] Phone: [phone]  Mobile Phone: [phone]  Relation: Child  Secondary Emergency Contact: [facility]  Mobile Phone: [phone]  Relation: Child    Past Surgical History:  Past Surgical History:   Procedure Laterality Date    CARDIAC CATHETERIZATION      CARDIAC VALVE REPLACEMENT      CATARACT REMOVAL      COLONOSCOPY  10/08/15     [facility]    PACEMAKER PLACEMENT  12/15/15     DR. BARTLETT     [place] COLON CA SCRN NOT  [address] IND N/A 10/17/2018    COLONOSCOPY ROOM performed by Ginger Moise MD at [address] [facility] [address] IND N/A 10/18/2018    COLONOSCOPY, ROOM 491 performed by Ginger Moise MD at [facility] EGD TRANSORAL BIOPSY SINGLE/MULTIPLE N/A 10/17/2018    EGD performed by Ginger Moise MD at [address]  10/08/15     [facility]       Immunization History:   Immunization History   Administered Date(s) Administered    Influenza Virus Vaccine 2012, 10/26/2012, 10/25/2013, 12/11/2014    Pneumococcal Polysaccharide ([id number]) 10/26/2012    Zoster Live (Zostavax) 11/28/2012       Active Problems:  Patient Active Problem List   Diagnosis Code    Hypertension I10    Hyperlipidemia E78.5    Permanent atrial fibrillation ([facility] I48.2    Rotator cuff tear M75.100    Pilonidal cyst L05.91    Erectile dysfunction N52.9    Vitamin B 12 deficiency E53.8    Subvalvular aortic stenosis Q24.4    Bradyarrhythmia I49.8    DVT (deep venous thrombosis) ([facility]) I82.409    PAD (peripheral artery disease) ([facility]) I73.9    Aortic stenosis I35.0    Fall at home W19. Chelseanette Lefort, Y92.009    Artificial cardiac pacemaker Z95.0    Pre-syncope R55    GI hemorrhage K92.2    Hemorrhagic shock ([facility]) R57.8    S/P TAVR (transcatheter aortic valve replacement) Z95.2    Elevated troponin R74.8    Chronic atrial fibrillation ([facility]) I48.2    NSTEMI (non-ST elevated myocardial infarction) ([facility]) I21.4    Adenomatous polyp of sigmoid colon D12.5    Shock ([facility]) R57.9    Hypotension due to hypovolemia I95.89, E86.1    Sepsis ([facility]) A41.9    Acute interstitial pneumonitis ([facility]) J84.114    Acute respiratory failure with hypoxia ([facility]) J96.01       Isolation/Infection:   Isolation          No Isolation            Nurse Assessment:  Last Vital Signs: BP (!) 108/51   Pulse 89   Temp 97.1 °F (36.2 °C) (Temporal)   Resp 27   Ht 5' 10\" (1.778 m)   Wt 162 lb 0.6 oz (73.5 kg)   SpO2 92%   BMI 23.25 kg/m²     Last documented pain score (0-10 scale): Pain Level: 0  Last Weight:   Wt Readings from Last 1 Encounters:   10/26/18 162 lb 0.6 oz (73.5 kg)     Mental Status:  {IP PT MENTAL STATUS:20030}    IV Access:  {[facility] IV ACCESS:446150416}    Nursing Mobility/ADLs:  Walking   {P DME USII:599584671}  Transfer  {P DME EGVA:199728757}  Bathing  {P DME DYZV:494077935}  Dressing  {P DME TZOY:828960652}  Toileting  {CHP DME VEYE:310439420}  Feeding  {CHP DME OVBF:690395505}  Med Admin  {CHP DME VOBU:472910687}  Med Delivery   [facility] MED Delivery:863577473}    Wound Care Documentation and Therapy:  Wound 10/18/18 Skin tear Hip Right (Active)   Wound Type Wound 10/26/2018 12:00 PM   Wound Traumatic 10/26/2018  4:00 AM   Dressing Status Clean;Dry; Intact 10/26/2018 12:00 PM   Dressing Changed Dressing reinforced 10/24/2018  4:25 PM   Dressing/Treatment Foam 10/26/2018 12:00 PM   Wound Cleansed Not Cleansed 10/23/2018  8:00 PM   Dressing Change Due 10/24/18 10/26/2018 12:00 PM   Wound Length (cm) 3.5 cm 10/18/2018  7:00 AM   Wound Width (cm) 2 cm 10/18/2018  7:00 AM   Wound Depth (cm)  0.2 10/18/2018  7:00 AM   Calculated Wound Size (cm^2) (l*w) 7 cm^2 10/18/2018  7:00 AM   Wound Assessment Rossiter;Pale 10/18/2018  7:00 AM   Drainage Amount Scant 10/18/2018  7:00 AM   Drainage Description Clear 10/18/2018  7:00 AM   Odor None 10/18/2018  7:00 AM   Rossiter%Wound Bed 100 10/18/2018  7:00 AM   Culture Taken No 10/26/2018 12:00 AM   Number of days: 8       Wound 10/18/18 Other (Comment) Leg Right; Outer; Lateral (Active)   Wound Type Wound 10/26/2018 12:00 PM   Wound Traumatic 10/26/2018  4:00 AM   Dressing Status Clean;Dry; Intact 10/26/2018 12:00 PM   Dressing Changed Dressing reinforced 10/24/2018  4:25 PM   Dressing/Treatment Silver dressing 10/26/2018 12:00 PM   Wound Cleansed Not Cleansed 10/23/2018  8:00 PM   Dressing Change Due 10/24/18 10/26/2018 12:00 PM   Wound Length (cm) 4.5 cm 10/18/2018  7:06 AM   Wound Width (cm) 2.5 cm 10/18/2018  7:06 AM   Wound Depth (cm)  0.2 10/18/2018  7:06 AM   Calculated Wound Size (cm^2) (l*w) 11.25 cm^2 10/18/2018  7:06 AM   Wound Assessment Yellow;Tan;Pink 10/18/2018  7:06 AM   Drainage Amount Scant 10/18/2018  7:06 AM   Odor None 10/18/2018  7:06 AM   Rossiter%Wound Bed 10 10/18/2018  7:06 AM   Yellow%Wound Bed 90 10/18/2018  7:06 AM   Culture Taken No 10/26/2018 12:00 AM   Number of days: 8        Elimination:  Continence:   · Bowel: {YES / CC:03415}  · Bladder: {YES /

## 2018-10-26 NOTE — PROGRESS NOTES
10/15/2018  EXAMINATION:  CT CERVICAL SPINE WO CONTRAST CLINICAL HISTORY:   trauma/pain COMPARISONS:  October 4, 2015 TECHNIQUE:  Spiral axial images of the cervical spine were obtained. Multiplanar two-dimensional reformatting was performed. FINDINGS:  There is advanced multilevel cervical spondylosis, with pronounced narrowing of intervertebral discs at all levels. There is fusion/ankylosis at C6-7. There is 3.6 mm anterolisthesis of C3 on C4. There is 3.5 mm anterolisthesis of C7 on T1. Spondylolisthesis is attributable to facet deformity associated with advanced apophyseal joint arthrosis, without fracture. Similar findings were present on October 4, 2015. There is advanced degenerative disease of the lateral axial articulation. There is no spinal canal stenosis. There is no sign of underlying pathology. Soft tissue window images reveal no evidence of disc herniation. SEVERE DEGENERATIVE DISEASE. UNCHANGED SPONDYLOLISTHESIS. NO SIGNIFICANT CHANGES SINCE OCTOBER 4, 2015 All CT scans at this facility use dose modulation, iterative reconstruction, and/or weight based dosing when appropriate to reduce radiation dose to as low as reasonably achievable. Ct Abdomen Pelvis W Iv Contrast Additional Contrast? Radiologist Recommendation    Result Date: 10/24/2018  CT ABDOMEN PELVIS W IV CONTRAST: 10/24/2018 CLINICAL HISTORY: Fever and sepsis. COMPARISON: 10/15/2018. TECHNIQUE: Spiral images were obtained of the abdomen and pelvis after the uneventful intravenous administration of approximately 100 mL of Isovue-370 contrast. All CT scans at this facility use dose modulation, iterative reconstruction, and/or weight based dosing when appropriate to reduce radiation dose to as low as reasonably achievable.  FINDINGS: Questionable patchy enhancement of both otherwise normal-appearing kidneys may be pyelonephritis or artifactual. A trace of high density material within the right side of the urinary bladder is probably administered. Utilizing sterile gel and sterile probe covers, a peripheral vein was accessed with sonographic guidance. A sonographic spot image was obtained for documentation. A guidewire was advanced into the vein with fluoroscopic guidance and a sheath was placed over the guidewire. A 5-Sami dual-lumen PICC was advanced through the sheath, up the arm and into the central vasculature. It was positioned appropriately. The sheath was removed. The catheter was shown to aspirate and infuse properly. The flange of the catheter was affixed to the arm using a PICC securement device. A spot image of the chest showed the tip of the PICC line to lie in the superior vena cava. The patient tolerated the procedure well and without complications. Number of films: 1 Fluoroscopy time: 43.9 seconds CONCLUSION: SUCCESSFUL RUE PICC PLACEMENT WITHOUT IMMEDIATE COMPLICATIONS. Xr Chest Portable    Result Date: 10/26/2018  EXAMINATION: XR CHEST PORTABLE CLINICAL HISTORY:  Acute interstitial pneumonitis COMPARISONS: October 19, 2018. October 20, 2018. October 23, 2018. FINDINGS: Single AP portable view the chest was obtained on October 26, 2018 at 0544 hours. Confluent, generalized bilateral atypical infiltrate versus pulmonary edema has worsened. The mediastinal silhouette is almost completely obscured. There is an endovascular aortic stent in place. Right upper extremity PICC has been displaced and is now in the internal jugular vein on the right. The left-sided single lead permanent pacemaker is unchanged. There is mild cardiomegaly. Mediastinum is not widened or  shifted. Small volume of subpulmonic effusion is suspected. CONCLUSION: PRONOUNCED WORSENING, GENERALIZED, CONFLUENT ATYPICAL INFILTRATE AND/OR EDEMA THROUGHOUT BOTH LUNGS.     Xr Chest Portable    Result Date: 10/23/2018  EXAMINATION: XR CHEST PORTABLE, 10/23/2018 9:15 AM History: 73-year-old male, shortness of breath COMPARISON:  October 20, 2018 FINDINGS: Chest-one view: There is cardiomegaly and worsening pulmonary vascular congestion centered around the perihilar regions. There is blunting of the right costophrenic angle, most consistent with a small right pleural effusion. There are degenerative changes of the spine. An aortic valvular stent is noted. There is a left chest wall cardiac device with a single lead. Monitoring leads project across the thorax. There is a right upper extremity approach PICC line, the tip terminates in the region of the distal SVC. Cardiomegaly and worsening pulmonary vascular congestion consistent with CHF. Xr Chest Portable    Result Date: 10/20/2018  Portable chest radiograph History: Respiratory failure. Technique: AP portable view of the chest obtained. Comparison: This radiograph from October 19, 2018 Findings: Left-sided pacemaker is present. Postsurgical changes of aortic valve replacement. . Unchanged cardiomegaly. Patchy bibasilar pulmonary opacities are not significant changed. Small bilateral pleural effusions, greater on the left. No pneumothorax. Right-sided PICC line has been placed and terminates in the SVC. No significant interval change of cardiomegaly, pulmonary vascular congestion, small bilateral pleural effusions and bibasilar infiltrate. Interval placement of right-sided PICC. No pneumothorax. Xr Chest Portable    Result Date: 10/19/2018  EXAMINATION: XR CHEST PORTABLE CLINICAL HISTORY:  fever COMPARISONS: 10/15/2018 FINDINGS: Patient is rotated to the left. There is moderate cardiomegaly. There is pulmonary vascular prominence. There are faint groundglass opacities in the lung bases consistent with edema, increased from prior examination; superimposed pneumonia is not excluded. There is obscuration of the costophrenic angles suggesting small pleural effusions. There is a left-sided single lead pacemaker. There is no pneumothorax. There is transcatheter aortic valve implantation.  There is admission, this seemed to resolve and stabilized now. Still anemic and thrombocytopenic.   7. Shock which has improved  Continue current treatment for now, BiPAP if needed but for now continue with high flow oxygen, follow-up chest x-ray and blood gases in a.m., continue corticosteroid therapy, diuresis as tolerated, will continue to follow    Electronically signed by Yazmin Houston MD, [facility] on 10/26/2018 at 12:16 PM

## 2018-10-26 NOTE — PROGRESS NOTES
BIPAP off and 5 L NC Applied. Bath given. Provided with fresh strawberries, Nector thickened lemonaide and water. No coughing or choking noted. Patient tolerated SPO2 maintained at 89-92% on 5L NC. Then returned to BIpap: 12/5 @ 40%.

## 2018-10-26 NOTE — PROGRESS NOTES
Department of Internal Medicine  Progress Note      SUBJECTIVE: Patient is AO x3 today. Sitting up and eating most of his breakfast. Off pressors since yesterday night and blood pressure stable. Voices no complains  No acute events overnight.        ROS:  All 12 systems reviewed and negative except mentioned in HPI and Assessment and plan    MEDICATIONS:  Current Facility-Administered Medications   Medication Dose Route Frequency Provider Last Rate Last Dose    metoprolol tartrate (LOPRESSOR) tablet 25 mg  25 mg Oral BID Chucky Rao MD   25 mg at 10/26/18 0842    furosemide (LASIX) injection 20 mg  20 mg Intravenous TID Chucky Rao MD        digoxin (LANOXIN) tablet 125 mcg  125 mcg Oral Daily Chucky Leal MD        methylPREDNISolone sodium (SOLU-MEDROL) injection 40 mg  40 mg Intravenous Q8H Claus Jorge MD   40 mg at 10/26/18 0540    meropenem (MERREM) 1 g in sodium chloride 0.9 % 100 mL IVPB (mini-bag)  1 g Intravenous Q8H Chucky Leal MD   Stopped at 10/26/18 0730    pantoprazole (PROTONIX) injection 40 mg  40 mg Intravenous BID Adama Garg MD   40 mg at 10/25/18 2019    And    sodium chloride (PF) 0.9 % injection 10 mL  10 mL Intravenous BID Adama Garg MD   10 mL at 10/25/18 2249    heparin (porcine) injection 5,000 Units  5,000 Units Subcutaneous 3 times per day Pedro Newberry MD   5,000 Units at 10/26/18 0540    magnesium oxide (MAG-OX) tablet 400 mg  400 mg Oral Daily Nickolas Read MD   400 mg at 10/26/18 8094    docusate sodium (COLACE) capsule 100 mg  100 mg Oral BID Pedro Newberry MD   100 mg at 10/26/18 0842    norepinephrine (LEVOPHED) 16 mg in dextrose 5 % 250 mL infusion  2 mcg/min Intravenous Continuous Pedro Newberry MD   Stopped at 10/26/18 0343    acetaminophen (TYLENOL) suppository 650 mg  650 mg Rectal Q4H PRN Alka Swain MD   650 mg at 10/19/18 0245    0.9 % sodium chloride bolus  250 mL Intravenous Once Alka Swain MD        midodrine (PROAMATINE) tablet 32.9*  33.1   --   33.4   RDW  17.4*  17.6*   --   17.5*   PLT  88*  106*   --   103*       Recent Labs      10/24/18   0600  10/25/18   0447  10/26/18   0510  10/26/18   0628   NA  143  143  146*   --    K  3.6  3.7  3.7   --    CL  106  106  109*   --    CO2  24  25  23   --    BUN  24*  27*  37*   --    CREATININE  1.03  1.04  1.09  1.4*   GLUCOSE  104  96  220*   --    CALCIUM  7.5*  7.7*  7.9*   --        No results for input(s): MG in the last 72 hours. ASSESSMENT AND PLAN    Active Hospital Problems    Diagnosis Date Noted    Acute interstitial pneumonitis ([facility] [J84.114]      Priority: High    Acute respiratory failure with hypoxia (HCC) [J96.01]      Priority: High    Chronic atrial fibrillation (HCC) [I48.2]      Priority: High    NSTEMI (non-ST elevated myocardial infarction) (HCC) [I21.4]      Priority: High    [facility] [A41.9]      Priority: Medium    Hypotension due to hypovolemia [I95.89, E86.1]      Priority: Low    Shock ([facility] [R57.9]      Priority: Low    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 Abx changed to broad coverage with meropenem for low grade fevers, and worsening leukocytosis. CT abdomen/pelvis does not show any acute process. CTA  Negative for PE  Blood cultures negative so far. Last day of meropenem today     - Acute on chronic hypoxic resp failure: Due to ? Acute interstitial pneumonitis vs pulmonary venous congestion Started on lasix with holding parameters. BIPAP      -  permanent a fib: Was on NOAC prior to admission. AC held due to GI bleed. -110. Started on Lopressor and Digoxin as prior to admission.  Amiodarone held due to concern for interstitial pneumonitis     - GI bleed: H and H

## 2018-10-26 NOTE — PROGRESS NOTES
PICC :Dual lumen occluded in both ports. A peripheral IV started in opposite arm. Levophed titrated down to 2 mg. Mean BP/74. Notified internal med, Dr Fransisco Peter. Ordered Cathflow.

## 2018-10-26 NOTE — PROGRESS NOTES
Effortful swallow, etc) with 80% acc in order to strengthen and establish and more effective swallow. Pt attempted to completed effortful swallow after max cues from clinician pt unable to initiate swallow without bolus being given. Goal 6: Pt will complete lingual exercises that promote anterior to posterior propulsion of bolus and improve tongue base retraction with 80% accuracy in order to strengthen the muscles of the swallow to decrease risk of aspiration and to increase ability to safely handle the least restrictive diet level. Not addressed     Rec: continue with current diet of puree (dysphagia I)/nectar with the addition of a double swallow as needed. [x] Pt demonstrated no s/s of pain during this visit. none  N/A  [] Nursing notified    Safety Devices:  [x] Call light within reach [] Chair alarm activated         [x] Bed alarm activated    Plan:  [x] Continue as per plan of care  [] Prepare for Discharge   [] Discharge      Patient/Caregiver Education:  Treatment goals discussed with [x]  Patient  []  family. The []  Patient  []  family understand the diagnosis, prognosis and plan of care.     Signature:  Electronically signed by NITO Chen on 10/26/2018 at 11:07 AM

## 2018-10-27 NOTE — PROGRESS NOTES
94. 8   --    PLT  103*  108*   --      Recent Labs      10/26/18   0510   10/27/18   0519  10/27/18   0558   NA  146*   --   148*   --    K  3.7   --   3.0*   --    CL  109*   --   109*   --    CO2  23   --   27   --    BUN  37*   --   44*   --    CREATININE  1.09   < >  1.16  1.4*   GLUCOSE  220*   --   159*   --    CALCIUM  7.9*   --   7.5*   --    LABGLOM  >60.0   < >  >60.0  49*   GFRAA  >60.0   < >  >60.0  59*    < > = values in this interval not displayed. MV Settings:     Vt Ordered: 511 mL  Rate Set: 12 bmp  FiO2 : 60 %  PEEP/CPAP: 4  Peak Inspiratory Pressure: 10 cmH2O    Recent Labs      10/27/18   0558   PHART  7.503*   [id number]  36   PO2ART  69*   [id number]  28.1   BEART  5*   [id number]  95*       O2 Device: High flow nasal cannula  O2 Flow Rate (L/min): 60 L/min    DIET DYSPHAGIA I PUREED;  Nectar Thick  Dietary Nutrition Supplements: Frozen Oral Supplement     MEDICATIONS during current hospitalization:    Continuous Infusions:   dexmedetomidine (PRECEDEX) IV infusion 2 mcg/hr (10/26/18 7784)       Scheduled Meds:   potassium chloride  10 mEq Intravenous Once    potassium chloride  40 mEq Oral Daily    furosemide  40 mg Intravenous TID    meropenem  1 g Intravenous Q8H    azithromycin  500 mg Intravenous Q24H    docusate  100 mg Oral BID    metoprolol tartrate  25 mg Oral BID    digoxin  125 mcg Oral Daily    methylPREDNISolone  40 mg Intravenous Q8H    pantoprazole  40 mg Intravenous BID    And    sodium chloride (PF)  10 mL Intravenous BID    heparin (porcine)  5,000 Units Subcutaneous 3 times per day    magnesium oxide  400 mg Oral Daily    sodium chloride  250 mL Intravenous Once    midodrine  10 mg Oral TID WC    sodium chloride flush  10 mL Intravenous 2 times per day    levalbuterol  1.25 mg Nebulization TID    sodium chloride flush  10 mL Intravenous 2 times per day       PRN Meds:acetaminophen, sodium chloride flush, levalbuterol, sodium chloride flush, acetaminophen, no pneumothorax, or other significant changes identified. STABLE CHEST COMPARED TO YESTERDAY. Xr Chest Portable    Result Date: 10/26/2018  EXAMINATION: XR CHEST PORTABLE CLINICAL HISTORY:  Acute interstitial pneumonitis COMPARISONS: October 19, 2018. October 20, 2018. October 23, 2018. FINDINGS: Single AP portable view the chest was obtained on October 26, 2018 at 0544 hours. Confluent, generalized bilateral atypical infiltrate versus pulmonary edema has worsened. The mediastinal silhouette is almost completely obscured. There is an endovascular aortic stent in place. Right upper extremity PICC has been displaced and is now in the internal jugular vein on the right. The left-sided single lead permanent pacemaker is unchanged. There is mild cardiomegaly. Mediastinum is not widened or  shifted. Small volume of subpulmonic effusion is suspected. CONCLUSION: PRONOUNCED WORSENING, GENERALIZED, CONFLUENT ATYPICAL INFILTRATE AND/OR EDEMA THROUGHOUT BOTH LUNGS. Xr Chest Portable    Result Date: 10/23/2018  EXAMINATION: XR CHEST PORTABLE, 10/23/2018 9:15 AM History: 32-year-old male, shortness of breath COMPARISON:  October 20, 2018 FINDINGS: Chest-one view: There is cardiomegaly and worsening pulmonary vascular congestion centered around the perihilar regions. There is blunting of the right costophrenic angle, most consistent with a small right pleural effusion. There are degenerative changes of the spine. An aortic valvular stent is noted. There is a left chest wall cardiac device with a single lead. Monitoring leads project across the thorax. There is a right upper extremity approach PICC line, the tip terminates in the region of the distal SVC. Cardiomegaly and worsening pulmonary vascular congestion consistent with CHF. Xr Chest Portable    Result Date: 10/20/2018  Portable chest radiograph History: Respiratory failure. Technique: AP portable view of the chest obtained. Comparison:  This radiograph

## 2018-10-27 NOTE — PROGRESS NOTES
10/26/18   0907  10/27/18   0519  10/27/18   0558   WBC  10.8   --   10.2  11.2*   --    RBC  2.61*   --   2.55*  2.44*   --    HGB  8.2*   < >  8.1*  7.6*  7.5*   HCT  24.8*   --   24.2*  23.1*   --    MCV  95.1   --   95.0  94.8   --    MCH  31.5*   --   31.8*  31.0   --    MCHC  33.1   --   33.4  32.6*   --    RDW  17.6*   --   17.5*  17.6*   --    PLT  106*   --   103*  108*   --     < > = values in this interval not displayed. Recent Labs      10/25/18   0447  10/26/18   0510  10/26/18   0628  10/27/18   0519  10/27/18   0558   NA  143  146*   --   148*   --    K  3.7  3.7   --   3.0*   --    CL  106  109*   --   109*   --    CO2  25  23   --   27   --    BUN  27*  37*   --   44*   --    CREATININE  1.04  1.09  1.4*  1.16  1.4*   GLUCOSE  96  220*   --   159*   --    CALCIUM  7.7*  7.9*   --   7.5*   --        No results for input(s): MG in the last 72 hours. ASSESSMENT AND PLAN    Active Hospital Problems    Diagnosis Date Noted    Acute interstitial pneumonitis ([facility] [J84.114]      Priority: High    Acute respiratory failure with hypoxia (HCC) [J96.01]      Priority: High    Chronic atrial fibrillation (HCC) [I48.2]      Priority: High    NSTEMI (non-ST elevated myocardial infarction) (HCC) [I21.4]      Priority: High    Sepsis ([facility] [A41.9]      Priority: Medium    Hypotension due to hypovolemia [I95.89, E86.1]      Priority: Low    [facility] ([facility] [R57.9]      Priority: Low    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 Resolved. meropenem . Patient is off pressors and afebrile. Leukocytosis due to steroids.      - Acute on chronic hypoxic resp failure: Due to ?  Acute interstitial pneumonitis vs pulmonary venous congestion

## 2018-10-27 NOTE — PROGRESS NOTES
fever   Vital signs stable    10/25/18  Patient awake and alert  Still low grade fever    10/26/18  Patient awake and alert, confused at times  Shortness of breath at rest  No fever today  Vital signs stable    1027/18  Patient awake, confused at times  Respiratory at bedside with patient  Shortness of breath at rest  Tele a-fib    EKG: Paced with AF        Review of Systems:   Review of Systems   Constitutional: Positive for fever. Negative for diaphoresis and fatigue. HENT: Negative. Eyes: Negative. Respiratory: Positive for shortness of breath. Negative for cough, chest tightness, wheezing and stridor. Cardiovascular: Negative. Negative for chest pain, palpitations and leg swelling. Gastrointestinal: Negative. Negative for blood in stool and nausea. Genitourinary: Negative. Musculoskeletal: Negative. Skin: Negative. Neurological: Positive for weakness. Negative for dizziness, syncope and light-headedness. Hematological: Negative. Psychiatric/Behavioral: Negative. Physical Examination:    BP (!) 115/54   Pulse 90   Temp 97.4 °F (36.3 °C) (Axillary)   Resp 25   Ht 5' 10\" (1.778 m)   Wt 159 lb 13.3 oz (72.5 kg)   SpO2 94%   BMI 22.93 kg/m²    Physical Exam   Constitutional: No distress. He appears acutely ill. HENT:   Normal cephalic and Atraumatic   Eyes: Pupils are equal, round, and reactive to light. Neck: Normal range of motion and thyroid normal. Neck supple. No JVD present. No neck adenopathy. No thyromegaly present. Cardiovascular: Normal rate, regular rhythm, intact distal pulses and normal pulses. Murmur heard. Pulmonary/Chest: Effort normal. He has no wheezes. He has bibasilar rales. He exhibits no tenderness. Abdominal: Soft. Bowel sounds are normal. There is no tenderness. Musculoskeletal: Normal range of motion. He exhibits edema (1+ LE 1-2 + UE (R)). He exhibits no tenderness.    Neurological: He is alert and oriented to person, place, and

## 2018-10-28 NOTE — PROGRESS NOTES
CRITICAL CARE PROGRESS NOTES    PATIENT NAME: Víctor Baumann  MRN: [id number]  SERVICE DATE:  October 28, 2018   SERVICE TIME:  12:11 PM      PRIMARY SERVICE: Critical care medicine    CHIEF COMPLAINTS: Lethargy and dyspnea    INTERVAL HPI: Patient seen and examined at bedside, Interval Notes, orders reviewed. Discussed on multidisciplinary rounds  Patient appears to be worse today. He has required continuous BiPAP support with 80% FiO2. He is less responsive but has required Precedex due to severe agitation. He is afebrile with stable hemodynamic status at this point. Chest x-ray showed no improvement in his diffuse interstitial and alveolar infiltrates bilaterally. PO2 was only 73 on 80% with BiPAP. OBJECTIVE    Body mass index is 22.93 kg/m². PHYSICAL EXAM:  Vitals:  /72   Pulse 76   Temp 97.1 °F (36.2 °C) (Axillary)   Resp 20   Ht 5' 10\" (1.778 m)   Wt 159 lb 13.3 oz (72.5 kg)   SpO2 98%   BMI 22.93 kg/m²   General: Patient is lethargic on Precedex drip tachypneic at rest with BiPAP on using low-profile mask    Head: Atraumatic , Normocephalic   Eyes: PERRL. No icteric sclera. No conjunctival injection. No discharge   ENT: No nasal  discharge. Pharynx clear. Neck:  Trachea midline. No thyromegaly, no JVD, No cervical adenopathy. Chest : Increased spontaneous breathing effort, symmetric bilateral excursions  Lung : Diminished breath sounds bilaterally, scattered rhonchi  Heart[de-identified] Irregular rhythm controlled rate. No mumur ,  Rub or gallop  ABD: Benign. Non-tender. Non-distended. No masses or organmegaly. Normal bowel sounds. EXT: No Pitting edema both lower extremities , No Cyanosis No clubbing  Neuro: no focal weakness  Skin: Warm and dry. No erythema or rash on exposed extremities.       DATA:   Recent Labs      10/27/18   0519   10/28/18   0504  10/28/18   0530   WBC  11.2*   --   11.0*   --    HGB  7.6*   < >  8.9*  9.4*   HCT  23.1*   --   27.4*   --    MCV  94.8   --   93.6   -- PLT  108*   --   81*   --     < > = values in this interval not displayed. Recent Labs      10/27/18   0519   10/28/18   0504  10/28/18   0530   NA  148*   --   152*   --    K  3.0*   --   3.5   --    CL  109*   --   112*   --    CO2  27   --   29   --    BUN  44*   --   59*   --    CREATININE  1.16   < >  1.23*  1.7*   GLUCOSE  159*   --   158*   --    CALCIUM  7.5*   --   7.6*   --    LABGLOM  >60.0   < >  56.4*  39*   GFRAA  >60.0   < >  >60.0  47*    < > = values in this interval not displayed. MV Settings:     Vt Ordered: 511 mL  Rate Set: 12 bmp  FiO2 : 80 %  PEEP/CPAP: 4  Peak Inspiratory Pressure: 10 cmH2O    Recent Labs      10/28/18   0530   PHART  7.461*   [id number]  44   PO2ART  73*   [id number]  31.2*   BEART  7*   [id number]  95*       O2 Device: Bi-PAP  O2 Flow Rate (L/min): 60 L/min    DIET DYSPHAGIA I PUREED;  Nectar Thick  Dietary Nutrition Supplements: Frozen Oral Supplement     MEDICATIONS during current hospitalization:    Continuous Infusions:   norepinephrine      propofol      dexmedetomidine (PRECEDEX) IV infusion 0.8 mcg/kg/hr (10/28/18 0803)       Scheduled Meds:   propofol        etomidate        digoxin  125 mcg Intravenous Daily    metoprolol  2.5 mg Intravenous Q4H    potassium chloride  40 mEq Oral Daily    furosemide  40 mg Intravenous TID    meropenem  1 g Intravenous Q8H    azithromycin  500 mg Intravenous Q24H    docusate  100 mg Oral BID    methylPREDNISolone  40 mg Intravenous Q8H    pantoprazole  40 mg Intravenous BID    And    sodium chloride (PF)  10 mL Intravenous BID    magnesium oxide  400 mg Oral Daily    sodium chloride  250 mL Intravenous Once    midodrine  10 mg Oral TID WC    sodium chloride flush  10 mL Intravenous 2 times per day    levalbuterol  1.25 mg Nebulization TID    sodium chloride flush  10 mL Intravenous 2 times per day       PRN Meds:acetaminophen, sodium chloride flush, levalbuterol, sodium chloride flush, acetaminophen, aortic valve prosthesis. Incidentally noted is dense mitral annular calcification. NO ACUTE PROCESS COMPARED TO 2010. All CT scans at this facility use dose modulation, iterative reconstruction, and/or weight based dosing when appropriate to reduce radiation dose to as low as reasonably achievable. Xr Pelvis (1-2 Views)    Result Date: 10/15/2018  EXAMINATION: XR CHEST PORTABLE, XR PELVIS (1-2 VIEWS), 10/15/2018 2:00 PM History: 46-year-old male, found on floor COMPARISON:  December 16, 2015 FINDINGS: Chest-AP upright: Bilateral patchy densities in the lung bases. There are degenerative changes of the spine. The costophrenic angles are clear. The cardiac silhouette is normal in size. Valvular prosthesis is noted. Aortic arch calcification. The aortic arch appears enlarged. The pulmonary vascularity is normal size. There are degenerative changes of the shoulders. There is a left chest wall pacemaker with a single lead. Monitoring leads project across the thorax. Pelvis-AP: There is spurring of the superior acetabular rim. Femoral heads are normal in contour and on this single AP view appears seated within the acetabula. No appreciable fracture. 1. Ill-defined patchy densities in the lung bases may represent atelectasis, infiltrate or pulmonary edema. 2. Enlarged aortic arch. 3. No appreciable acute osseous abnormality to the pelvis. Note: A nondisplaced sacral fracture may not be evident on radiograph. Ct Head Wo Contrast    Result Date: 10/15/2018  EXAMINATION: CT of the brain without contrast HISTORY: Patient found on floor on mattress. COMPARISON: CT brain from August 14, 2018 TECHNIQUE: Multiple contiguous axial images were obtained of the brain from the skull base through the vertex. Multiplanar reformats were obtained. FINDINGS: Prominence of the sulci and ventricles. Nonspecific bilateral supratentorial white matter hypoattenuation appears similar to prior examination.  Gray-white matter

## 2018-10-28 NOTE — PROGRESS NOTES
 sodium chloride (PF) 0.9 % injection 10 mL  10 mL Intravenous BID Melvi Bagley MD   10 mL at 10/27/18 2226    magnesium oxide (MAG-OX) tablet 400 mg  400 mg Oral Daily Gita Grey MD   400 mg at 10/27/18 1137    acetaminophen (TYLENOL) suppository 650 mg  650 mg Rectal Q4H PRN Manolo Jorge MD   650 mg at 10/19/18 0245    0.9 % sodium chloride bolus  250 mL Intravenous Once Manolo Jorge MD        midodrine (PROAMATINE) tablet 10 mg  10 mg Oral TID WC Blair Torres MD   10 mg at 10/28/18 1025    sodium chloride flush 0.9 % injection 10 mL  10 mL Intravenous 2 times per day Blair Torres MD   10 mL at 10/27/18 1141    sodium chloride flush 0.9 % injection 10 mL  10 mL Intravenous PRN Blair Torres MD   10 mL at 10/26/18 2128    levalbuterol (Alean Both) nebulizer solution 1.25 mg  1.25 mg Nebulization TID Bruce Higginbotham MD   1.25 mg at 10/28/18 1114    levalbuterol (XOPENEX) nebulizer solution 1.25 mg  1.25 mg Nebulization Q2H PRN Bruce Higginbotham MD        sodium chloride flush 0.9 % injection 10 mL  10 mL Intravenous 2 times per day Juliet Forbes PA-C   10 mL at 10/27/18 1141    sodium chloride flush 0.9 % injection 10 mL  10 mL Intravenous PRN Juliet Forbes PA-C        acetaminophen (TYLENOL) tablet 650 mg  650 mg Oral Q4H PRN Juliet Forbes PA-C   650 mg at 10/23/18 0816    ondansetron (ZOFRAN) injection 4 mg  4 mg Intravenous Q6H PRN Juliet Forbes PA-C             OBJECTIVE:  Vital Signs:  Vitals:    10/28/18 1213   BP:    Pulse:    Resp: 16   Temp:    SpO2:        General Exam (except as mentioned above):  CONSTITUTIONAL: Awake, alert, no apparent distress  EYES:  PERRL, conjunctiva normal  ENT:  Normocephalic, atraumatic  NECK:  Supple  BACK:  Symmetric  LUNGS:  CTAB except bilateral basilar crackles. CARDIOVASCULAR:  S1S2 present  ABDOMEN:  soft, non-distended, non-tender  MUSCULOSKELETAL:  There is no redness, warmth, or swelling of the joints.   NEUROLOGIC:  Alert, awake, oriented x 3. No FND  EXTREMITIES: Warm and well perfused. LABS  Recent Labs      10/26/18   0907  10/27/18   0519  10/27/18   0558  10/28/18   0504  10/28/18   0530   WBC  10.2  11.2*   --   11.0*   --    RBC  2.55*  2.44*   --   2.93*   --    HGB  8.1*  7.6*  7.5*  8.9*  9.4*   HCT  24.2*  23.1*   --   27.4*   --    MCV  95.0  94.8   --   93.6   --    MCH  31.8*  31.0   --   30.3   --    MCHC  33.4  32.6*   --   32.4*   --    RDW  17.5*  17.6*   --   18.1*   --    PLT  103*  108*   --   81*   --        Recent Labs      10/26/18   0510   10/27/18   0519  10/27/18   0558  10/28/18   0504  10/28/18   0530   NA  146*   --   148*   --   152*   --    K  3.7   --   3.0*   --   3.5   --    CL  109*   --   109*   --   112*   --    CO2  23   --   27   --   29   --    BUN  37*   --   44*   --   59*   --    CREATININE  1.09   < >  1.16  1.4*  1.23*  1.7*   GLUCOSE  220*   --   159*   --   158*   --    CALCIUM  7.9*   --   7.5*   --   7.6*   --     < > = values in this interval not displayed. No results for input(s): MG in the last 72 hours. ASSESSMENT AND PLAN    Active Hospital Problems    Diagnosis Date Noted    Acute interstitial pneumonitis (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 Resolved. meropenem .  Patient is off

## 2018-10-28 NOTE — CARE COORDINATION
D/C PLANNING STILL FOR [facility] ONCE MEDICALLY STABLE. PATIENT ON IV PRECEDEX, AND USING HIGH FLOW O2 AND FF BIPAP. PATIENT HAS PT/OT ORDERED. PATIENT TO STAY IN ICU AT THIS TIME. LSW/C3 TO FOLLOW.

## 2018-10-29 PROBLEM — E43 SEVERE MALNUTRITION (HCC): Status: ACTIVE | Noted: 2018-01-01

## 2018-10-29 NOTE — PROGRESS NOTES
Differential:    Lab Results   Component Value Date    WBC 11.0 10/29/2018    RBC 2.88 10/29/2018    RBC 3.69 08/13/2018    HGB 8.9 10/29/2018    HCT 27.0 10/29/2018    PLT 63 10/29/2018    MCV 93.6 10/29/2018    MCH 30.7 10/29/2018    MCHC 32.8 10/29/2018    RDW 18.4 10/29/2018    NRBC 1 10/18/2018    BANDSPCT 2 10/18/2018    LYMPHOPCT 4.8 10/26/2018    MONOPCT 2.3 10/26/2018    BASOPCT 0.2 10/26/2018    MONOSABS 0.2 10/26/2018    LYMPHSABS 0.5 10/26/2018    EOSABS 0.0 10/26/2018    BASOSABS 0.0 10/26/2018     CMP:    Lab Results   Component Value Date     10/29/2018    K 4.1 10/29/2018    K 3.5 10/23/2018     10/29/2018    CO2 27 10/29/2018    BUN 68 10/29/2018    CREATININE 1.11 10/29/2018    GFRAA >60.0 10/29/2018    AGRATIO 1.4 08/13/2018    LABGLOM >60.0 10/29/2018    GLUCOSE 165 10/29/2018    GLUCOSE 104 08/13/2018    PROT 5.6 10/19/2018    LABALBU 2.9 10/19/2018    LABALBU 4.1 08/13/2018    CALCIUM 7.4 10/29/2018    BILITOT 0.8 10/19/2018    ALKPHOS 70 10/19/2018     10/19/2018     10/19/2018     BMP:    Lab Results   Component Value Date     10/29/2018    K 4.1 10/29/2018    K 3.5 10/23/2018     10/29/2018    CO2 27 10/29/2018    BUN 68 10/29/2018    LABALBU 2.9 10/19/2018    LABALBU 4.1 08/13/2018    CREATININE 1.11 10/29/2018    CALCIUM 7.4 10/29/2018    GFRAA >60.0 10/29/2018    LABGLOM >60.0 10/29/2018    GLUCOSE 165 10/29/2018    GLUCOSE 104 08/13/2018     Magnesium:    Lab Results   Component Value Date    MG 2.0 10/23/2018     Troponin:    Lab Results   Component Value Date    TROPONINI 0.035 10/15/2018        Active Hospital Problems    Diagnosis Date Noted    Chronic atrial fibrillation [facility]) [I48.2]      Priority: High    [facility]STEM[facility] (non-ST elevated [facility]) [facility] [I21.4]      Priority: High    Acute interstitial pneumonitis (HCC) [J84.114]     Acute respiratory failure with hypoxia (HCC) [J96.01]     [facility] [A41.9]     Hypotension due to

## 2018-10-29 NOTE — PLAN OF CARE
Problem: Falls - Risk of:  Goal: Will remain free from falls  Will remain free from falls     Outcome: Ongoing    Goal: Absence of physical injury  Absence of physical injury     Outcome: Ongoing      Problem: Discharge Planning:  Goal: Discharged to appropriate level of care  Discharged to appropriate level of care   Outcome: Not Met This Shift    Goal: Ability to perform activities of daily living will improve  Ability to perform activities of daily living will improve    Outcome: Not Met This Shift    Goal: Participates in care planning  Participates in care planning    Outcome: Not Met This Shift      Problem: Confusion - Acute:  Goal: Absence of continued neurological deterioration signs and symptoms  Absence of continued neurological deterioration signs and symptoms    Outcome: Ongoing    Goal: Mental status will be restored to baseline  Mental status will be restored to baseline    Outcome: Not Met This Shift      Problem: Injury - Risk of, Physical Injury:  Goal: Absence of physical injury  Absence of physical injury     Outcome: Ongoing    Goal: Will remain free from falls  Will remain free from falls     Outcome: Ongoing      Problem: Mood - Altered:  Goal: Mood stable  Mood stable    Outcome: Ongoing    Goal: Absence of abusive behavior  Absence of abusive behavior    Outcome: Ongoing    Goal: Verbalizations of feeling emotionally comfortable while being cared for will increase  Verbalizations of feeling emotionally comfortable while being cared for will increase    Outcome: Not Met This Shift      Problem: Psychomotor Activity - Altered:  Goal: Absence of psychomotor disturbance signs and symptoms  Absence of psychomotor disturbance signs and symptoms    Outcome: Ongoing      Problem: Sensory Perception - Impaired:  Goal: Demonstrations of improved sensory functioning will increase  Demonstrations of improved sensory functioning will increase    Outcome: Not Met This Shift    Goal: Decrease in sensory

## 2018-10-29 NOTE — PROGRESS NOTES
CRITICAL CARE PROGRESS NOTES    PATIENT NAME: Brenda Rodriguez  MRN: [id number]  SERVICE DATE:  October 29, 2018   SERVICE TIME:  12:43 PM      PRIMARY SERVICE: Critical care medicine    CHIEF COMPLAINTS: Intubated sedated on the vent    INTERVAL HPI: Patient seen and examined at bedside, Interval Notes, orders reviewed. Discussed on multidisciplinary rounds  Patient is doing significantly better since intubation yesterday. He did have some hypotensive episodes requiring low dose pressors but also developed significant volume depletion due to aggressive diuresis with worsening hypernatremia and prerenal azotemia. He is improving today is receiving some fluid resuscitation back. He is sedated intubated orally oxygenation has improved markedly he is down 40% FiO2. His PO2 was 161 on 50% with 8 of PEEP early this morning. Chest x-ray showed significant improvement in diffuse interstitial and alveolar infiltrates      OBJECTIVE    Body mass index is 22.93 kg/m². PHYSICAL EXAM:  Vitals:  BP (!) 123/56   Pulse 62   Temp 98.4 °F (36.9 °C) (Axillary)   Resp 18   Ht 5' 10\" (1.778 m)   Wt 159 lb 13.3 oz (72.5 kg)   SpO2 100%   BMI 22.93 kg/m²   General: Patient is sedated, intubated orally, appears in no significant distress at this point   Head: Atraumatic , Normocephalic   Eyes: PERRL. No icteric sclera. No conjunctival injection. No discharge   ENT: No nasal  discharge. Pharynx clear. Neck:  Trachea midline. No thyromegaly, no JVD, No cervical adenopathy. Chest : Adequate spontaneous breathing effort, symmetric bilateral excursions  Lung : Decreased breath sounds bilaterally, clear otherwise  Heart[de-identified] Regular rhythm and rate. No mumur ,  Rub or gallop  ABD: Benign. Non-tender. Non-distended. No masses or organmegaly. Normal bowel sounds. EXT: No Pitting edema both lower extremities , No Cyanosis No clubbing  Neuro: no focal weakness  Skin: Warm and dry.   No erythema or rash on exposed Multiple contiguous axial images were obtained of the brain from the skull base through the vertex. Multiplanar reformats were obtained. FINDINGS: Prominence of the sulci and ventricles. Nonspecific bilateral supratentorial white matter hypoattenuation appears similar to prior examination. Gray-white matter differentiation is preserved. No acute hemorrhage or abnormal extra-axial fluid collection. Basal cisterns are patent. No mass effect or midline shift. The visualized paranasal sinuses and mastoid air cells are clear. Calvarium is intact. No acute intracranial process. 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 All CT scans at this facility use dose modulation, iterative reconstruction, and/or weight based dosing when appropriate to reduce radiation dose to as low as reasonably achievable. Cta Chest W Wo Contrast    Result Date: 10/24/2018  CTA CHEST W WO CONTRAST: 10/24/2018 CLINICAL HISTORY: Hypoxemia. COMPARISON: Chest CT without contrast 5/21/2015. Spiral enhanced images were obtained of the chest during the infusion of approximately 100 mL of Isovue 370 contrast with pulmonary artery  CTA protocol. Routine and volume rendered images were obtained on a 3-dimensional workstation. All CT scans at this facility use dose modulation, iterative reconstruction, and/or weight based dosing when appropriate to reduce radiation dose to as low as reasonably achievable. FINDINGS: There are no filling defects identified within the pulmonary arterial vasculature to suggest pulmonary emboli. The heart is mildly enlarged with a previous aortic valve stent prosthesis, and a left subclavian single lead pacemaker. Mitral valve calcification is again noted. The thoracic aorta is normal in caliber and tortuous with mild atherosclerotic plaquing. There is no dissection.  Moderate-sized layering throughout the lung fields bilaterally with perhaps some sparing in the left upper lobe. Residual small pleural effusions persist. However, the overall appearance the chest has slowly improved compared to films dating back to 10/26/2018. Compared to most recent study of 10/28/2018 there's been improvement as well. RESIDUAL BILATERAL INFILTRATES CONSISTENT WITH PNEUMONIA, GRADUALLY AND MINIMALLY IMPROVED COMPARED TO PREVIOUS 3 STUDIES. Xr Chest Portable    Result Date: 10/28/2018  XR CHEST PORTABLE : 10/28/2018 CLINICAL HISTORY: Intubated. COMPARISON: Earlier 10/20/2018. TECHNIQUE: A portable upright AP radiograph of the chest was obtained at approximately 12:32 PM. FINDINGS: An endotracheal tube has been placed with its tip approximately 3 cm above the toma. An orogastric tube extends below the diaphragm and the inferior to the lower aspect of the radiograph, likely in good position. Moderate to severe airspace and interstitial infiltrates throughout both lungs appears unchanged, and is probably ARDS, edema, and/or atypical pneumonia. Small pleural effusions are suspected. The heart remains mildly enlarged with a left subclavian single lead pacemaker and prosthetic aortic valve again noted. There is no pneumothorax, or other significant changes from yesterday. ENDOTRACHEAL AND OROGASTRIC TUBE INTUBATION IN GOOD APPARENT POSITION. OTHERWISE, STABLE CHEST COMPARED TO EARLIER TODAY. Xr Chest Portable    Result Date: 10/28/2018  XR CHEST PORTABLE : 10/28/2018 CLINICAL HISTORY:  interstitial pneumonitis . COMPARISON: 10/27/2018. TECHNIQUE: A portable upright AP radiograph of the chest was obtained. FINDINGS: Moderate to severe airspace and interstitial infiltrates throughout both lungs appears unchanged, and is probably ARDS, edema, and/or atypical pneumonia. Small pleural effusions are suspected.  The heart remains mildly enlarged with a left subclavian single lead pacemaker and prosthetic aortic valve

## 2018-10-29 NOTE — PROGRESS NOTES
Speech-Language Pathology      NAME:  Arianna Parsons  ROOM: IC16/IC16-01  :  1935  DATE: 10/29/2018    This patient was being seen by Speech Therapy for:    []  Speech/Language Treatment  [x]  Dysphagia Treatment  []  Cognitive Treatment  []  Other:      However, due to this patient's change in medical status, Speech Therapy will require new orders to continue to follow the patient. Please re-order, as needed. Thank you,  Eduard Ortiz.  Americ[employer] Search, Date: 10/29/2018, Time: 9:05 AM

## 2018-10-29 NOTE — PROGRESS NOTES
Nutrition Assessment    Type and Reason for Visit: Reassess, Consult (TF order and manage)    Nutrition Recommendations: Continue NPO, Start Tube Feeding   Low Calorie/High Proteint TF ( Vital HP) @ 20 ml/hr via OG. Increase to goal rate of 40 ml/hr. Add 1 bottle protein modular with water flush 1x daily. Flush with 50 ml every 6 hrs. This will deliver ~1064 kcals ( + propofol, IVF kcals), 110 g protein, 1000 ml free water   Recommend daily MVI with minerals    Malnutrition Assessment:  · Malnutrition Status: Meets the criteria for severe malnutrition  · Context: Acute illness or injury  · Findings of the 6 clinical characteristics of malnutrition (Minimum of 2 out of 6 clinical characteristics is required to make the diagnosis of moderate or severe Protein Calorie Malnutrition based on AND/ASPEN Guidelines):  1. Energy Intake-Less than or equal to 50%, greater than 7 days    2. Weight Loss-Unable to assess (due to edema),    3. Fat Loss-No significant subcutaneous fat loss,    4. Muscle Loss-Moderate muscle mass loss, Clavicles (pectoralis and deltoids)  5. Fluid Accumulation-Moderate to severe fluid accumulation, Generalized  6.  Strength-Normal (handshake firm)    Nutrition Diagnosis:   · Problem: Inadequate oral intake  · Etiology: related to Impaired respiratory function-inability to consume food     Signs and symptoms:  as evidenced by NPO status due to medical condition, Intubation    Nutrition Assessment:  · Subjective Assessment: LOS @# 14,  Intubated 10/28, per rounds okay to start TF, suboptimal po prior to intubation.   · Nutrition-Focused Physical Findings: 1+ generalized bilateral upper and lower edema, last BM 10/28, OG in place, IVF = D5 1/2 NS @ 100 ml/hr, current propofol @ 11 ml/hr  · Wound Type: None  · Current Nutrition Therapies:  · Oral Diet Orders: NPO ( 10/28)  · Tube Feeding (TF) Orders:   · Feeding Route: Orogastric  · Formula: Low Calorie, High Protein  · Rate (ml/hr):20 ml/hr increase to a goal of 40 ml/hr    · Volume (ml/day): 480 ml  · Duration: Continuous 24hrs  · Additives/Modulars: Protein (x1)  · TF Residuals: Not applicable (TF not yet intitiated)  · Water Flushes:  (50 every 6)  · Current TF & Flush Orders Provides: 480 kcals,42g proteein, 600 ml free water  · Goal TF & Flush Orders Provides: 1064 kcals, 110 g protein, 1000 ml free water  · Additional Calories: IVF~ 400 kcals, current propofol ~ 260 kcals  · Anthropometric Measures:  · Ht: 5' 10\" (177.8 cm)   · Current Body Wt: 159 lb (72.1 kg)  · Admission Body Wt: 158 lb (71.7 kg)  · Usual Body Wt: 160 lb (72.6 kg) ((8/18), pt states 160-165# ' he thinks')  · % Weight Change:unable to determine due to edema  · Ideal Body Wt: 166 lb (75.3 kg), % Ideal Body 97%  · BMI Classification: BMI 18.5 - 24.9 Normal Weight  · Comparative Standards (Estimated Nutrition Needs):  · Estimated Daily Total Kcal: 4284-6771  · Estimated Daily Protein (g): 108-129  · Estimated Daily Fluid (ml/day): ~1800    Estimated Intake vs Estimated Needs: Intake Less Than Needs    Nutrition Risk Level: High    Nutrition Interventions: Continue NPO, Start Tube Feeding (Low Calorie/High Proteint TF ( Vital HP) @ 20 ml/hr via OG. Increawse to goal rate of 40 ml/hr. Add 1 bottle protein modular with water flush 1x daily. Flush with 50 ml every 6 hrs. (1064 kcals, 110 g protein, 1000 ml free water) Recommend daily MVI )  Continued Inpatient Monitoring, Education Not Indicated    Nutrition Evaluation:   · Evaluation: Goals set   · Goals: Intiation and tolerance of EN, EN to deliver > 85% estimated needs   · Monitoring: NPO Status, TF Intake, TF Tolerance, Fluid Balance, Ascites/Edema, Weight, Pertinent Labs    See Adult Nutrition Doc Flowsheet for more detail.      Electronically signed by Esther Luis RD, AMINATA on 10/29/18 at 11:22 AM

## 2018-10-29 NOTE — PROGRESS NOTES
Physical Therapy   Facility/Department: [facility] MED SURG IC16/IC16-01    NAME: Eleanor Joshua    : 1935 (82 y.o.)  MRN: [id number]    Account: [de-identified]  Gender: male    Hold PT treatment on this date d/t patient had a change in medical status. Change in medical status discussed with supervising PT on this date. Please re-consult physical therapy when appropriate. Note written to attending hospitalist  via \"sticky note\" requesting updated Physical Therapy eval and treat orders when pt is appropriate for out of bed activity.       [facility]) Physical Therapy Department      Electronically signed by Trina Davis PT on 10/29/18 at 12:23 PM

## 2018-10-30 NOTE — PROGRESS NOTES
the toma. An orogastric tube extends below the diaphragm and the inferior to the lower aspect of the radiograph, likely in good position. Moderate to severe airspace and interstitial infiltrates throughout both lungs appears unchanged, and is probably ARDS, edema, and/or atypical pneumonia. Small pleural effusions are suspected. The heart remains mildly enlarged with a left subclavian single lead pacemaker and prosthetic aortic valve again noted. There is no pneumothorax, or other significant changes from yesterday. ENDOTRACHEAL AND OROGASTRIC TUBE INTUBATION IN GOOD APPARENT POSITION. OTHERWISE, STABLE CHEST COMPARED TO EARLIER TODAY. Xr Chest Portable    Result Date: 10/28/2018  XR CHEST PORTABLE : 10/28/2018 CLINICAL HISTORY:  interstitial pneumonitis . COMPARISON: 10/27/2018. TECHNIQUE: A portable upright AP radiograph of the chest was obtained. FINDINGS: Moderate to severe airspace and interstitial infiltrates throughout both lungs appears unchanged, and is probably ARDS, edema, and/or atypical pneumonia. Small pleural effusions are suspected. The heart remains mildly enlarged with a left subclavian single lead pacemaker and prosthetic aortic valve again noted. There is no pneumothorax, or other significant changes from yesterday. STABLE CHEST COMPARED TO YESTERDAY. Xr Chest Portable    Result Date: 10/27/2018  XR CHEST PORTABLE : 10/27/2018 CLINICAL HISTORY:  Interstitial pneumonitis, follow-up . COMPARISON: Several recent prior studies. TECHNIQUE: A portable upright AP radiograph of the chest was obtained. FINDINGS: A right upper extremity PICC catheter extending into the neck has been removed. Moderate to marked diffuse airspace and interstitial infiltrates with small pleural effusions has not significantly changed from yesterday, given differences in technique. The heart remains mildly enlarged. A prosthetic aortic valve and left subclavian approach single lead pacemaker is again noted. lie in the superior vena cava. The patient tolerated the procedure well and without complications. Number of films: 1 Fluoroscopy time: 43.9 seconds CONCLUSION: SUCCESSFUL RUE PICC PLACEMENT WITHOUT IMMEDIATE COMPLICATIONS. IMPRESSION AND SUGGESTION:  1. Acute on chronic hypoxic respiratory failure, required intubation 2 days ago, oxygenation is stable but no further improvement  2. Acute interstitial pneumonitis, inflammatory etiology versus infectious.   No significant change noted clinically or radiographically  3. Non-ST segment elevation MI with abnormal cardiac enzymes  4. Chronic atrial fibrillation  5. Acute GI bleeding while on anticoagulation, patient has remained stable with no evidence of active blood loss, and with stable H&H   6. Encephalopathy of critical illness as well as in relation to high-dose systemic steroids, he is completely sedated now since intubation   7.  Poor nutritional state, on tube feeding  We'll continue all current management as before, patient is showing no further improvement, if this continues over the next 24-48 hours, will discuss care directions and code status with his daughter    Electronically signed by Rupesh Lundy MD, FCCP on 10/30/2018 at 2:40 PM

## 2018-10-30 NOTE — PROGRESS NOTES
Component Value Date    WBC 14.8 10/30/2018    RBC 2.75 10/30/2018    RBC 3.69 08/13/2018    HGB 8.4 10/30/2018    HCT 26.1 10/30/2018    PLT 80 10/30/2018    MCV 94.7 10/30/2018    MCH 30.5 10/30/2018    MCHC 32.2 10/30/2018    RDW 18.6 10/30/2018    NRBC 1 10/18/2018    BANDSPCT 2 10/18/2018    LYMPHOPCT 4.8 10/26/2018    MONOPCT 2.3 10/26/2018    BASOPCT 0.2 10/26/2018    MONOSABS 0.2 10/26/2018    LYMPHSABS 0.5 10/26/2018    EOSABS 0.0 10/26/2018    BASOSABS 0.0 10/26/2018     CMP:    Lab Results   Component Value Date     10/30/2018    K 4.0 10/30/2018    K 3.5 10/23/2018     10/30/2018    CO2 29 10/30/2018    BUN 60 10/30/2018    CREATININE 0.93 10/30/2018    GFRAA >60.0 10/30/2018    AGRATIO 1.4 08/13/2018    LABGLOM >60.0 10/30/2018    GLUCOSE 294 10/30/2018    GLUCOSE 104 08/13/2018    PROT 5.6 10/19/2018    LABALBU 2.9 10/19/2018    LABALBU 4.1 08/13/2018    CALCIUM 7.2 10/30/2018    BILITOT 0.8 10/19/2018    ALKPHOS 70 10/19/2018     10/19/2018     10/19/2018     BMP:    Lab Results   Component Value Date     10/30/2018    K 4.0 10/30/2018    K 3.5 10/23/2018     10/30/2018    CO2 29 10/30/2018    BUN 60 10/30/2018    LABALBU 2.9 10/19/2018    LABALBU 4.1 08/13/2018    CREATININE 0.93 10/30/2018    CALCIUM 7.2 10/30/2018    GFRAA >60.0 10/30/2018    LABGLOM >60.0 10/30/2018    GLUCOSE 294 10/30/2018    GLUCOSE 104 08/13/2018     Magnesium:    Lab Results   Component Value Date    MG 2.0 10/23/2018     Troponin:    Lab Results   Component Value Date    TROPONINI 0.035 10/15/2018         Assessment/Plan:    Active Hospital Problems    Diagnosis Date Noted    Chronic atrial fibrillation (HCC) [I48.2]      Priority: High    NSTEMI (non-ST elevated myocardial infarction) [facility] [I21.4]      Priority: High    Severe malnutrition ([facility] [E43] 10/29/2018    Acute interstitial pneumonitis (HCC) [C52.450]     Acute respiratory failure with hypoxia ([facility] [J96.01]    

## 2018-10-30 NOTE — PROGRESS NOTES
sodium. Change IV fluid to D5 water. Increase water flushes at 200 mL every 8 hours. Continue to monitor hemoglobin. Appreciate pulmonary and cardiology input. We'll follow the recommendations. I may or may not have addressed all of this pt symptoms, medical issues, abnormal labs and findings. Pt will need additional work up, investigation, testing, surveillance, and treatment to be done at a later time and date during this hospitalization or post discharge by PCP and other out pt providers. Portion of patient care is managed by other providers. Please refer to their notes for details. Further workup and plan will be determined based on the clinical progression and a follow-up tests result. Night and next week  hospitalist will take care of the patient in my absence.

## 2018-10-31 NOTE — PROGRESS NOTES
gallop  ABD: Benign. Non-tender. Non-distended. No masses or organmegaly. Normal bowel sounds. EXT: No significant Pitting edema both lower extremities , No Cyanosis No clubbing  Neuro: Decreased responsiveness, other changes reported above  Skin: Warm and dry. No erythema or rash on exposed extremities. DATA:   Recent Labs      10/30/18   0514  10/31/18   0037  10/31/18   0600   WBC  14.8*   --   14.4*   HGB  8.4*  8.1*  7.9*   HCT  26.1*  26.1*  24.1*   MCV  94.7   --   95.6   PLT  80*   --   70*     Recent Labs      10/31/18   0038  10/31/18   0600   NA  148*  146*   K  5.1  5.3*   CL  109*  109*   CO2  28  28   BUN  59*  66*   CREATININE  1.05  1.01   GLUCOSE  184*  214*   CALCIUM  7.2*  7.1*   PROT   --   4.9*   LABALBU   --   2.3*   BILITOT   --   0.8   ALKPHOS   --   98   AST   --   42*   ALT   --   35   LABGLOM  >60.0  >60.0   GFRAA  >60.0  >60.0       MV Settings:     Vent Mode: AC/VC  Vt Ordered: 500 mL  Rate Set: 24 bmp  FiO2 : 60 %  PEEP/CPAP: 6  Peak Inspiratory Pressure: 34 cmH2O  Mean Airway Pressure: 15 cmH20  I:E Ratio: 1:1.50    Recent Labs      10/31/18   0509   PHART  7.191*   AAY0EWW  89*   PO2ART  76*   CYS6RNM  34.2*   BEART  6*   D1DWYMHA  90*       O2 Device: Ventilator  O2 Flow Rate (L/min): 60 L/min    DIET TUBE FEED CONTINUOUS/CYCLIC NPO; Low Calorie High Protein (vital HP);  Orogastric; Continuous; 20; 40; 24     MEDICATIONS during current hospitalization:    Continuous Infusions:   dextrose 50 mL/hr at 10/30/18 0913    dextrose      norepinephrine 4 mcg/min (10/31/18 0200)    propofol Stopped (10/31/18 1002)    dexmedetomidine (PRECEDEX) IV infusion 0.8 mcg/kg/hr (10/31/18 1023)       Scheduled Meds:   insulin NPH  10 Units Subcutaneous BID    insulin lispro  0-12 Units Subcutaneous Q4H    pantoprazole  40 mg Intravenous Daily    And    sodium chloride (PF)  10 mL Intravenous Daily    albumin human  25 g Intravenous BID    [START ON 11/1/2018] methylPREDNISolone  40 mg There is no sign of appendicitis. Urinary bladder is slightly dilated. Prostate is small. There is no abdominal wall hernia. 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 There is nonacute anterior wedging of T11, unchanged since 2010. There is scarring at the lung bases, left more than right. There are signs of COPD. Incidentally noted is metal artifact associated with an endovascular aortic valve prosthesis. Incidentally noted is dense mitral annular calcification. NO ACUTE PROCESS COMPARED TO 2010. All CT scans at this facility use dose modulation, iterative reconstruction, and/or weight based dosing when appropriate to reduce radiation dose to as low as reasonably achievable. Xr Pelvis (1-2 Views)    Result Date: 10/15/2018  EXAMINATION: XR CHEST PORTABLE, XR PELVIS (1-2 VIEWS), 10/15/2018 2:00 PM History: 82-year-old male, found on floor COMPARISON:  December 16, 2015 FINDINGS: Chest-AP upright: Bilateral patchy densities in the lung bases. There are degenerative changes of the spine. The costophrenic angles are clear. The cardiac silhouette is normal in size. Valvular prosthesis is noted. Aortic arch calcification. The aortic arch appears enlarged. The pulmonary vascularity is normal size. There are degenerative changes of the shoulders. There is a left chest wall pacemaker with a single lead. Monitoring leads project across the thorax. Pelvis-AP: There is spurring of the superior acetabular rim. Femoral heads are normal in contour and on this single AP view appears seated within the acetabula. No appreciable fracture. 1. Ill-defined patchy densities in the lung bases may represent atelectasis, infiltrate or pulmonary edema. 2. Enlarged aortic arch. 3. No appreciable acute osseous abnormality to the pelvis.  Note: A nondisplaced sacral fracture may not be evident on radiograph. Ct Head Wo Contrast    Result Date: 10/31/2018  CT Brain Contrast medium:  Not utilized. History:  Change in condition Comparison:  CT brain, October 15, 2018, August 14, 2018. Findings: Extra-axial spaces:  Normal. Intracranial hemorrhage:  None. Ventricular system: Ventricles mildly enlarged. Sulci mildly prominent. Basal Cisterns:  Normal. Cerebral Parenchyma: Bilateral periventricular areas of decreased attenuation, symmetric. Midline Shift:  None. Cerebellum:  Normal. Paranasal sinuses and mastoid air cells: Endotracheal tube identified. Visualized Orbits: Remote right ocular surgery. Impression: Chronic ischemic white matter disease. Mild cerebral atrophy. All CT scans at this facility use dose modulation, iterative reconstruction, and/or weight based dosing when appropriate to reduce radiation dose to as low as reasonably achievable. Ct Head Wo Contrast    Result Date: 10/15/2018  EXAMINATION: CT of the brain without contrast HISTORY: Patient found on floor on mattress. COMPARISON: CT brain from August 14, 2018 TECHNIQUE: Multiple contiguous axial images were obtained of the brain from the skull base through the vertex. Multiplanar reformats were obtained. FINDINGS: Prominence of the sulci and ventricles. Nonspecific bilateral supratentorial white matter hypoattenuation appears similar to prior examination. Gray-white matter differentiation is preserved. No acute hemorrhage or abnormal extra-axial fluid collection. Basal cisterns are patent. No mass effect or midline shift. The visualized paranasal sinuses and mastoid air cells are clear. Calvarium is intact. No acute intracranial process. 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  All CT scans at this facility use dose modulation, iterative reconstruction, and/or weight based dosing utilized. A pre-procedure time out was performed in order to assure the correct patient and procedure. Local anesthetic was administered. Utilizing sterile gel and sterile probe covers, a peripheral vein was accessed with sonographic guidance. A sonographic spot image was obtained for documentation. A guidewire was advanced into the vein with fluoroscopic guidance and a sheath was placed over the guidewire. A 5-Danish dual-lumen PICC was advanced through the sheath, up the arm and into the central vasculature. It was positioned appropriately. The sheath was removed. The catheter was shown to aspirate and infuse properly. The flange of the catheter was affixed to the arm using a PICC securement device. A spot image of the chest showed the tip of the PICC line to lie in the superior vena cava. The patient tolerated the procedure well and without complications. Number of films: 1 Fluoroscopy time: 43.9 seconds CONCLUSION: SUCCESSFUL RUE PICC PLACEMENT WITHOUT IMMEDIATE COMPLICATIONS. IMPRESSION AND SUGGESTION:  1. Acute on chronic hypoxic respiratory failure, required intubation 3 days ago, worsening hypercapnia and respiratory acidosis associated with no additional breathing effort, currently increasing vent rate to 24/m and evaluating neurologic status  2. Acute interstitial pneumonitis, inflammatory etiology versus infectious.   No significant change noted clinically or radiographically, he remains on systemic steroids and broad-spectrum antibiotic therapy  3. Non-ST segment elevation MI with abnormal cardiac enzymes  4. Chronic atrial fibrillation  5.  Acute GI bleeding while on anticoagulation, patient has remained stable with no evidence of active blood loss, and with stable but slowly declining H&H   6. Encephalopathy of critical illness as well as in relation to high-dose systemic steroids, worsening neurologic status suspected this morning as mentioned above, will discontinue all sedatives and observe closely, obtain CT of the brain   7. Poor nutritional state, on tube feeding  Long discussion with patient's daughter on the phone and explained the multiorgan system failure, neurologic changes suspected today, and poor prognosis given multitude of issues, vent dependency, lack of improvement, age, nutritional status, etc.  I spent 42 minutes providing critical care. This time is excluding time spent performing procedures.       Electronically signed by Osbaldo Garcia MD, FCCP on 10/31/2018 at 11:07 AM

## 2018-10-31 NOTE — PROGRESS NOTES
Progress Note  Patient: Fidencio Awan  Unit/Bed: ZE73/TC27-48  YOB: 1935  MRN: [id number]  Acct: [de-identified]   Admitting Diagnosis: GI hemorrhage [K92.2]  Hemorrhagic shock ([address] [R57.8]  Admit Date:  10/15/2018  Hospital Day: 12    Chief Complaint: GI bleed     Subjective/HPI: 82year old male with history of TAVR and shortness of breath. GI bleed diagnosed but subsequently patient status has declined with sepsis/pneumonia. Now intubated. EKG:  Review of Systems:   Review of Systems   Unable to perform ROS: Intubated     10/31/18  Intubated and sedated    fio2 60%  AC 24  PEEP 6    Physical Examination:    /61   Pulse 65   Temp 99.5 °F (37.5 °C) (Oral)   Resp 25   Ht 5' 10\" (1.778 m)   Wt 168 lb 6.9 oz (76.4 kg)   SpO2 99%   BMI 24.17 kg/m²    Physical Exam   Constitutional: He appears cachectic. He appears acutely ill. Intubated sedated   HENT:   Mouth/Throat: Oropharynx is clear. Eyes: Pupils are equal, round, and reactive to light. Neck: Normal range of motion. No JVD present. Cardiovascular: Regular rhythm, S1 normal, S2 normal, normal heart sounds and intact distal pulses. Exam reveals no gallop. No murmur heard. Pulses:       Radial pulses are 2+ on the right side. Dorsalis pedis pulses are 2+ on the right side. Pulmonary/Chest: Effort normal. He has no wheezes. He has bibasilar rales. He exhibits no tenderness. Abdominal: Soft. Bowel sounds are normal.   Musculoskeletal: Normal range of motion. He exhibits no edema. Neurological: He is alert and oriented to person, place, and time. He has intact cranial nerves. Skin: Skin is warm and dry. No rash noted.        LABS:  CBC:   Lab Results   Component Value Date    WBC 14.4 10/31/2018    RBC 2.52 10/31/2018    RBC 3.69 08/13/2018    HGB 7.9 10/31/2018    HCT 24.1 10/31/2018    MCV 95.6 10/31/2018    MCH 31.3 10/31/2018    MCHC 32.8 10/31/2018    RDW 18.7 10/31/2018    PLT 70 10/31/2018    MPV Priority: Low    Shock ([facility] [address] [R57.9]      Priority: Low    Adenomatous polyp of sigmoid colon [D12.5]      Priority: Low    S/P TAVR (transcatheter aortic valve replacement) [Z95.2] 10/16/2018     Priority: Low    Elevated troponin [R74.8] 10/16/2018     Priority: Low    GI hemorrhage [K92.2] 10/15/2018     Priority: Low    Hemorrhagic shock ([facility] [address] [R57.8] 10/15/2018     Priority: Low        I reviewed and agree with the findings and plan documented in his note . Impression       VDRF secondary to PNA/Pneumonitis vs sepsis vs other  Anemia s/p GIB  afibb with CVR  Hx of TAVR  Shock on levophed. S/p Rhabdo  Normal LVF  Enterococcus bacteriuria  Encephalopathy      Plan     1. ICU care and management  2. Wean levophed as kathy  3. No OAC due to anemia  4. Monitor on tele  5. GI/DVT proph  6. Await CT of brain  7. Cont with digoxin and IV lopressor for now. 8. Prognosis is poor.         Electronically signed by Bridgett Acosta DO on 10/31/18 at 1:52 PM

## 2018-10-31 NOTE — PROGRESS NOTES
MAP 51 levophed increased. H&H sent to lab.  given 10 units per SSI order. Oral care provided assessment completed. 1949 critical H&H values perfect served to physician. 2000 type and screen sent to lab. Right hand weeping today, new from yesterday's assessment. Does respond to pain, however more stimuli needed to illicit response. BLE more responsive and withdrawls when greater toe is touched Patient restless, increased RR suctioned moderate amount of creamy tan secretions from ETT. Precedex gtt increased. Hourly urine output adequate. Temp 98.9 , but patient feel hot. Cool compress to head. 2042 patient not restless anymore RR matches vent settings ( 24). Pulses bounding, increased pitting since yesterday, weeping, and rhonchi heard. physician perfect served for diuretic. 2224 Lasix pushed and blood up and infusing well. Nurse in 1:1 to observe patient. Oral care provided. 2241 no reaction observed. Temp elevated, but has been elevated. Cool compress to patient's face. Turned and repositioned. ATB finished infusing with no adverse reactions. Heels floated. Abrasion to nose MAURO for now to prevent moisture retention ad further skin breakdown under moist bandaid. restaraint removed while nurse in room. Edema to wrists same as yesterday pulses bounding skin is pink and warm. 0040 Report given to Rosendo Garcia at bedside. Patient in no distress.

## 2018-10-31 NOTE — CARE COORDINATION
LSW spoke to dtr [place] she is aware that her father will not get better. She said that she could meet with hospice in am 10ish to get info about the program. Dtr stated that she will be in around 830 am to meet with Dr Rony Flores to hear of CT results. .Electronically signed by XU Lugo on 10/31/2018 at 3:37 PM

## 2018-10-31 NOTE — PROGRESS NOTES
Patient turned and repositioned. All gtts verified. Teeth, tongue, mouth, lis covered with dried,  dark reddish brown crusty. Oral care provided, lip moisturizer. Plan of care explained to patient of tolerating vent, afebrile, comfort, stable BP. Edema in bilateral  Hands. 2230 Blood sent to lab.  0000 lindsay tubing had excessive sediment at connection site. New lindsay bag placed. Draining well. Clear straw colored urine. 0530 Dr. Bessie Christopher contacted per RT about ABG. Dr. Bessie Christopher called with new vent settings. Vent settings changed by RT Valentino Hayward. H2O flush given. New mepilex applied to right lateral knee. cxray taken.

## 2018-11-01 NOTE — PROGRESS NOTES
[id number] Able to wean off Levophed overnight. Heart rate is irrattic at times-tachy to 130's although -monitor indicates v tach despite changing leads monitored and ecg leads on patient several times. Heart rate came down to 80's after 0430 lopressor but went irrattic shortly after. Opening eyes and following simple commands. Dr Frida Thorpe rounded this AM-Updated.

## 2018-11-01 NOTE — PROGRESS NOTES
CTA protocol. Routine and volume rendered images were obtained on a 3-dimensional workstation. All CT scans at this facility use dose modulation, iterative reconstruction, and/or weight based dosing when appropriate to reduce radiation dose to as low as reasonably achievable. FINDINGS: There are no filling defects identified within the pulmonary arterial vasculature to suggest pulmonary emboli. The heart is mildly enlarged with a previous aortic valve stent prosthesis, and a left subclavian single lead pacemaker. Mitral valve calcification is again noted. The thoracic aorta is normal in caliber and tortuous with mild atherosclerotic plaquing. There is no dissection. Moderate-sized layering pleural effusions are present bilaterally, with moderate early diffuse airspace and interstitial pulmonary infiltrates centrally, with mild sparing of the lung periphery. There is no pericardial effusion, lymphadenopathy, or findings to suggest malignancy. Degenerative changes of the thoracic spine are noted with a moderate compression fracture of T11, which appears healed and minimal anterior wedging of the mid to lower levels. NO EVIDENCE OF PULMONARY EMBOLI. PROBABLE PULMONARY AND INTERSTITIAL EDEMA WITH MODERATE-SIZED PLEURAL EFFUSIONS. ATYPICAL PNEUMONIA SHOULD BE EXCLUDED CLINICALLY. CARDIOMEGALY, AND OTHER CHRONIC AND INCIDENTAL FINDINGS, AS NOTED. Ct Cervical Spine Wo Contrast    Result Date: 10/15/2018  EXAMINATION:  CT CERVICAL SPINE WO CONTRAST CLINICAL HISTORY:   trauma/pain COMPARISONS:  October 4, 2015 TECHNIQUE:  Spiral axial images of the cervical spine were obtained. Multiplanar two-dimensional reformatting was performed. FINDINGS:  There is advanced multilevel cervical spondylosis, with pronounced narrowing of intervertebral discs at all levels. There is fusion/ankylosis at C6-7. There is 3.6 mm anterolisthesis of C3 on C4. There is 3.5 mm anterolisthesis of C7 on T1.  Spondylolisthesis is attributable to facet deformity associated with advanced apophyseal joint arthrosis, without fracture. Similar findings were present on October 4, 2015. There is advanced degenerative disease of the lateral axial articulation. There is no spinal canal stenosis. There is no sign of underlying pathology. Soft tissue window images reveal no evidence of disc herniation. SEVERE DEGENERATIVE DISEASE. UNCHANGED SPONDYLOLISTHESIS. NO SIGNIFICANT CHANGES SINCE OCTOBER 4, 2015 All CT scans at this facility use dose modulation, iterative reconstruction, and/or weight based dosing when appropriate to reduce radiation dose to as low as reasonably achievable. Ct Abdomen Pelvis W Iv Contrast Additional Contrast? Radiologist Recommendation    Result Date: 10/24/2018  CT ABDOMEN PELVIS W IV CONTRAST: 10/24/2018 CLINICAL HISTORY: Fever and sepsis. COMPARISON: 10/15/2018. TECHNIQUE: Spiral images were obtained of the abdomen and pelvis after the uneventful intravenous administration of approximately 100 mL of Isovue-370 contrast. All CT scans at this facility use dose modulation, iterative reconstruction, and/or weight based dosing when appropriate to reduce radiation dose to as low as reasonably achievable. FINDINGS: Questionable patchy enhancement of both otherwise normal-appearing kidneys may be pyelonephritis or artifactual. A trace of high density material within the right side of the urinary bladder is probably calcified debris or wall calcification. A Junior catheter is noted within the incompletely decompressed urinary bladder, which is otherwise unremarkable. Moderate nonspecific presacral edema and anasarca is probably related to metabolic derangement/fluid overload. The gallbladder is nearly contracted and otherwise unremarkable. There is no significant biliary dilatation, ascites, abscess, free air, lymphadenopathy, constipation, bowel wall thickening, or other significant change from the prior study identified.  L4 spondylolysis with grade 1 anterolisthesis and extensive degenerative changes are again noted. There is no worrisome bone destruction identified. QUESTION OF PYELONEPHRITIS VERSUS ARTIFACT. ANASARCA AND PRESACRAL EDEMA. MILD CONSTIPATION. WITHIN THE LEFT SIDE OF AN INCOMPLETELY DECOMPRESSED URINARY BLADDER CONTAINING A HERNANDEZ CATHETER. L4 SPONDYLOLYSIS, GRADE 1 ANTEROLISTHESIS AND EXTENSIVE DEGENERATIVE CHANGES OF THE L4-5 LEVEL. Bijal Hitchcock Device Placement    Result Date: 10/29/2018  PERIPHERALLY INSERTED CENTRAL CATHETER (PICC) PLACEMENT WITH ULTRASOUND GUIDANCE CLINICAL HISTORY:  ICU PATIENT NEEDS CENTRAL VENOUS ACCESS FOR FLUID THERAPY AND IV MEDICATIONS, PHYSICIAN REQUESTS PLACEMENT OF PICC LINE After discussing the procedure and possible complications with the patient, informed consent was obtained. The patient was placed on the Special Procedures table. The right upper extremity was sterilely prepared using 2% chlorhexidine. Central venous catheter was inserted using a maximal sterile barrier technique which includes cap, mask, sterile gown, sterile gloves, sterile full-body drape, hand hygiene and 2% chlorhexidine for cutaneous antisepsis. A sterile ultrasound technique with sterile gel and sterile probe covers was also utilized. A pre-procedure time out was performed in order to assure the correct patient and procedure. Local anesthetic was administered. Utilizing sterile gel and sterile probe covers, a peripheral vein was accessed with sonographic guidance. A sonographic spot image was obtained for documentation. A guidewire was advanced into the vein with fluoroscopic guidance and a sheath was placed over the guidewire. A 5-[place] triple-lumen PICC was advanced through the sheath, up the arm and into the central vasculature. It was positioned appropriately. The sheath was removed. The catheter was shown to aspirate and infuse properly.   The flange of the catheter was affixed to the arm using a PICC securement device. A spot image of the chest showed the tip of the PICC line to lie in the superior vena cava. The patient tolerated the procedure well and without complications. Number of films: 1 Fluoroscopy time: 18.1 seconds CONCLUSION: SUCCESSFUL RUE PICC PLACEMENT WITHOUT IMMEDIATE COMPLICATIONS. Bijal Boyd Coad Device Placement    Result Date: 10/19/2018  PERIPHERALLY INSERTED CENTRAL CATHETER (PICC) PLACEMENT WITH ULTRASOUND GUIDANCE CLINICAL HISTORY:  ICU PATIENT WITH GI BLEED AND HYPOTENSION, PATIENT NEEDS CENTRAL VENOUS ACCESS FOR IV ACCESS AND BLOOD TRANSFUSION, PHYSICIAN REQUESTS PLACEMENT OF PICC LINE After discussing the procedure and possible complications with the patient, informed consent was obtained. The patient was placed on the Special Procedures table. The right upper extremity was sterilely prepared using 2% chlorhexidine. Central venous catheter was inserted using a maximal sterile barrier technique which includes cap, mask, sterile gown, sterile gloves, sterile full-body drape, hand hygiene and 2% chlorhexidine for cutaneous antisepsis. A sterile ultrasound technique with sterile gel and sterile probe covers was also utilized. A pre-procedure time out was performed in order to assure the correct patient and procedure. Local anesthetic was administered. Utilizing sterile gel and sterile probe covers, a peripheral vein was accessed with sonographic guidance. A sonographic spot image was obtained for documentation. A guidewire was advanced into the vein with fluoroscopic guidance and a sheath was placed over the guidewire. A 5-[place] dual-lumen PICC was advanced through the sheath, up the arm and into the central vasculature. It was positioned appropriately. The sheath was removed. The catheter was shown to aspirate and infuse properly. The flange of the catheter was affixed to the arm using a PICC securement device.   A spot image of the chest showed the fluoroscopic guidance and a sheath was placed over the guidewire. A 5-[place] triple-lumen PICC was advanced through the sheath, up the arm and into the central vasculature. It was positioned appropriately. The sheath was removed. The catheter was shown to aspirate and infuse properly. The flange of the catheter was affixed to the arm using a PICC securement device. A spot image of the chest showed the tip of the PICC line to lie in the superior vena cava. The patient tolerated the procedure well and without complications. Number of films: 1 Fluoroscopy time: 18.1 seconds CONCLUSION: SUCCESSFUL RUE PICC PLACEMENT WITHOUT IMMEDIATE COMPLICATIONS. Ir Ultrasound Guidance Vascular Access    Result Date: 10/19/2018  PERIPHERALLY INSERTED CENTRAL CATHETER (PICC) PLACEMENT WITH ULTRASOUND GUIDANCE CLINICAL HISTORY:  ICU PATIENT WITH GI BLEED AND HYPOTENSION, PATIENT NEEDS CENTRAL VENOUS ACCESS FOR IV ACCESS AND BLOOD TRANSFUSION, PHYSICIAN REQUESTS PLACEMENT OF PICC LINE After discussing the procedure and possible complications with the patient, informed consent was obtained. The patient was placed on the Special Procedures table. The right upper extremity was sterilely prepared using 2% chlorhexidine. Central venous catheter was inserted using a maximal sterile barrier technique which includes cap, mask, sterile gown, sterile gloves, sterile full-body drape, hand hygiene and 2% chlorhexidine for cutaneous antisepsis. A sterile ultrasound technique with sterile gel and sterile probe covers was also utilized. A pre-procedure time out was performed in order to assure the correct patient and procedure. Local anesthetic was administered. Utilizing sterile gel and sterile probe covers, a peripheral vein was accessed with sonographic guidance. A sonographic spot image was obtained for documentation. A guidewire was advanced into the vein with fluoroscopic guidance and a sheath was placed over the guidewire. A 5-[place] dual-lumen PICC was advanced through the sheath, up the arm and into the central vasculature. It was positioned appropriately. The sheath was removed. The catheter was shown to aspirate and infuse properly. The flange of the catheter was affixed to the arm using a PICC securement device. A spot image of the chest showed the tip of the PICC line to lie in the superior vena cava. The patient tolerated the procedure well and without complications. Number of films: 1 Fluoroscopy time: 43.9 seconds CONCLUSION: SUCCESSFUL RUE PICC PLACEMENT WITHOUT IMMEDIATE COMPLICATIONS. IMPRESSION AND SUGGESTION:  1. Acute on chronic hypoxic respiratory failure, remains on the vent with increased work of breathing, increased FiO2 and PEEP requirement  2. Acute interstitial pneumonitis, inflammatory etiology versus infectious.   No significant change noted clinically or radiographically, he remains on systemic steroids and broad-spectrum antibiotic therapy, he had aggressive diuresis initially with no response  3. Non-ST segment elevation MI with abnormal cardiac enzymes  4. Chronic atrial fibrillation, inadequate rate control probably associated with increased work of breathing and wakefulness with agitation  5. Acute GI bleeding while on anticoagulation, patient has remained stable with no evidence of active blood loss, and with stable but slowly declining H&H , counts have improved after a unit of blood transfusion yesterday he was down to 6.8 before that  6. Encephalopathy of critical illness, patient's responsiveness improved off sedation but remains somnolent    7. Poor nutritional state, on tube feeding  Patient's overall status remains critical despite mild improvement in neurologic status compared to yesterday and no acute findings on CT of the brain.   He continues with multiorgan system failure, progressive renal decline, respiratory failure with no improvement despite invasive mechanical

## 2018-11-02 NOTE — CARE COORDINATION
Patient remains on vent. Discussed during rounds, patient overall worse. B/Cr up, Hgb down, WBC up. Patient back on levo per RN. Dr. Lynn Daly notified of results of Hospice meeting 11/1 -- States plans to discuss with patient's daughter. Plans pending family wishes and medical course.

## 2018-11-02 NOTE — CARE COORDINATION
LSW Spoke with grand dtr she stated the 2 dtrs will be in tonight and Sat. She stated that the 2 dtrs went to  homes last night. Brittany Webb Electronically signed by XU Junior on 2018 at 3:08 PM

## 2018-11-02 NOTE — PROGRESS NOTES
Date: 10/15/2018  EXAMINATION:  CT ABDOMEN PELVIS WO CONTRAST CLINICAL HISTORY:  Possible injury. Pain. Patient found on floor, laying on a mattress COMPARISONS:  January 1, 2010 TECHNIQUE:  Spiral axial images of the abdomen and pelvis were obtained without intravenous contrast enhancement. Multiplanar two-dimensional reformatting was performed. Comparison is made to prior examination. FINDINGS:  The normal volume gallbladder contains homogenous high density material, suggesting vicarious excretion of intravenous contrast. Is unclear whether the patient might have had intravenous contrast administration elsewhere recently. The patient has received no contrast in this radiology department recently. Differential diagnosis includes milk of calcium bile. There is no free peritoneal fluid or air. There is no abscess or mass in the abdominal cavity, retroperitoneum or pelvis. There is no bowel obstruction. There is no urinary tract obstruction. There is no biliary tree obstruction. The liver is unremarkable. The pancreas is unremarkable. The spleen is unremarkable. Adrenal glands and kidneys are unremarkable. The heavily calcified aorta is not dilated. Inferior vena cava is unremarkable. The stomach and small bowel are unremarkable. There is sigmoid diverticulosis without diverticulitis. There is no sign of appendicitis. Urinary bladder is slightly dilated. Prostate is small. There is no abdominal wall hernia. 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 There is nonacute anterior wedging of T11, unchanged since 2010. There is scarring at the lung bases, left more than right. There are signs of COPD. Incidentally noted is metal artifact associated with an endovascular aortic valve prosthesis. Incidentally noted is dense mitral annular calcification.      NO ACUTE PROCESS COMPARED TO 2010. All CT scans at this facility use dose modulation, iterative reconstruction, and/or weight based dosing when appropriate to reduce radiation dose to as low as reasonably achievable. Xr Pelvis (1-2 Views)    Result Date: 10/15/2018  EXAMINATION: XR CHEST PORTABLE, XR PELVIS (1-2 VIEWS), 10/15/2018 2:00 PM History: 82-year-old male, found on floor COMPARISON:  December 16, 2015 FINDINGS: Chest-AP upright: Bilateral patchy densities in the lung bases. There are degenerative changes of the spine. The costophrenic angles are clear. The cardiac silhouette is normal in size. Valvular prosthesis is noted. Aortic arch calcification. The aortic arch appears enlarged. The pulmonary vascularity is normal size. There are degenerative changes of the shoulders. There is a left chest wall pacemaker with a single lead. Monitoring leads project across the thorax. Pelvis-AP: There is spurring of the superior acetabular rim. Femoral heads are normal in contour and on this single AP view appears seated within the acetabula. No appreciable fracture. 1. Ill-defined patchy densities in the lung bases may represent atelectasis, infiltrate or pulmonary edema. 2. Enlarged aortic arch. 3. No appreciable acute osseous abnormality to the pelvis. Note: A nondisplaced sacral fracture may not be evident on radiograph. Ct Head Wo Contrast    Result Date: 10/31/2018  CT Brain Contrast medium:  Not utilized. History:  Change in condition Comparison:  CT brain, October 15, 2018, August 14, 2018. Findings: Extra-axial spaces:  Normal. Intracranial hemorrhage:  None. Ventricular system: Ventricles mildly enlarged. Sulci mildly prominent. Basal Cisterns:  Normal. Cerebral Parenchyma: Bilateral periventricular areas of decreased attenuation, symmetric. Midline Shift:  None. Cerebellum:  Normal. Paranasal sinuses and mastoid air cells: Endotracheal tube identified. Visualized Orbits: Remote right ocular surgery. low as reasonably achievable. FINDINGS: There are no filling defects identified within the pulmonary arterial vasculature to suggest pulmonary emboli. The heart is mildly enlarged with a previous aortic valve stent prosthesis, and a left subclavian single lead pacemaker. Mitral valve calcification is again noted. The thoracic aorta is normal in caliber and tortuous with mild atherosclerotic plaquing. There is no dissection. Moderate-sized layering pleural effusions are present bilaterally, with moderate early diffuse airspace and interstitial pulmonary infiltrates centrally, with mild sparing of the lung periphery. There is no pericardial effusion, lymphadenopathy, or findings to suggest malignancy. Degenerative changes of the thoracic spine are noted with a moderate compression fracture of T11, which appears healed and minimal anterior wedging of the mid to lower levels. NO EVIDENCE OF PULMONARY EMBOLI. PROBABLE PULMONARY AND INTERSTITIAL EDEMA WITH MODERATE-SIZED PLEURAL EFFUSIONS. ATYPICAL PNEUMONIA SHOULD BE EXCLUDED CLINICALLY. CARDIOMEGALY, AND OTHER CHRONIC AND INCIDENTAL FINDINGS, AS NOTED. Ct Cervical Spine Wo Contrast    Result Date: 10/15/2018  EXAMINATION:  CT CERVICAL SPINE WO CONTRAST CLINICAL HISTORY:   trauma/pain COMPARISONS:  October 4, 2015 TECHNIQUE:  Spiral axial images of the cervical spine were obtained. Multiplanar two-dimensional reformatting was performed. FINDINGS:  There is advanced multilevel cervical spondylosis, with pronounced narrowing of intervertebral discs at all levels. There is fusion/ankylosis at C6-7. There is 3.6 mm anterolisthesis of C3 on C4. There is 3.5 mm anterolisthesis of C7 on T1. Spondylolisthesis is attributable to facet deformity associated with advanced apophyseal joint arthrosis, without fracture. Similar findings were present on October 4, 2015. There is advanced degenerative disease of the lateral axial articulation. There is no spinal canal stenosis. COMPLICATIONS. Xr Chest Portable    Result Date: 10/31/2018  XR CHEST PORTABLE : 10/31/2018 CLINICAL HISTORY:  Bilateral pneumonitis . COMPARISON: 10/29/2018. A portable upright AP radiograph of the chest was obtained. FINDINGS: There has been interval placement of a right upper extremity PICC catheter with its tip overlying the cavoatrial junction. Endotracheal and orogastric tubes remain in good apparent position. Patchy airspace and interstitial infiltrates appear similar to 10/29/2018, with mild apparent worsening of a 4 to 5 cm area within the right suprahilar mid to upper lung field. There is no significant pleural effusion, increasing cardiomegaly, pneumothorax, worsening vascular congestion, or other significant changes identified. POSSIBLE WORSENING SMALL FOCAL RIGHT UPPER LOBE INFILTRATE SINCE YESTERDAY VERSUS TECHNICAL CHANGES. INTERVAL PLACEMENT OF A RIGHT UPPER EXTREMITY PICC CATHETER. . OTHERWISE, NO OTHER SIGNIFICANT CHANGE FROM 10/29/2018. Xr Chest Portable    Result Date: 10/29/2018  EXAMINATION: XR CHEST PORTABLE REASON FOR EXAM: Pneumonia and respiratory failure FINDINGS: Frontal view of the chest reveals endotracheal tube, NG tube, and pacemaker satisfactory and unchanged. Pulmonary vasculature is prominent. There is persistent residual reticular nodular infiltrate throughout the lung fields bilaterally with perhaps some sparing in the left upper lobe. Residual small pleural effusions persist. However, the overall appearance the chest has slowly improved compared to films dating back to 10/26/2018. Compared to most recent study of 10/28/2018 there's been improvement as well. RESIDUAL BILATERAL INFILTRATES CONSISTENT WITH PNEUMONIA, GRADUALLY AND MINIMALLY IMPROVED COMPARED TO PREVIOUS 3 STUDIES. Xr Chest Portable    Result Date: 10/28/2018  XR CHEST PORTABLE : 10/28/2018 CLINICAL HISTORY: Intubated. COMPARISON: Earlier 10/20/2018.  TECHNIQUE: A portable upright AP radiograph of the chest was obtained at approximately 12:32 PM. FINDINGS: An endotracheal tube has been placed with its tip approximately 3 cm above the toma. An orogastric tube extends below the diaphragm and the inferior to the lower aspect of the radiograph, likely in good position. Moderate to severe airspace and interstitial infiltrates throughout both lungs appears unchanged, and is probably ARDS, edema, and/or atypical pneumonia. Small pleural effusions are suspected. The heart remains mildly enlarged with a left subclavian single lead pacemaker and prosthetic aortic valve again noted. There is no pneumothorax, or other significant changes from yesterday. ENDOTRACHEAL AND OROGASTRIC TUBE INTUBATION IN GOOD APPARENT POSITION. OTHERWISE, STABLE CHEST COMPARED TO EARLIER TODAY. Xr Chest Portable    Result Date: 10/28/2018  XR CHEST PORTABLE : 10/28/2018 CLINICAL HISTORY:  interstitial pneumonitis . COMPARISON: 10/27/2018. TECHNIQUE: A portable upright AP radiograph of the chest was obtained. FINDINGS: Moderate to severe airspace and interstitial infiltrates throughout both lungs appears unchanged, and is probably ARDS, edema, and/or atypical pneumonia. Small pleural effusions are suspected. The heart remains mildly enlarged with a left subclavian single lead pacemaker and prosthetic aortic valve again noted. There is no pneumothorax, or other significant changes from yesterday. STABLE CHEST COMPARED TO YESTERDAY. Xr Chest Portable    Result Date: 10/27/2018  XR CHEST PORTABLE : 10/27/2018 CLINICAL HISTORY:  Interstitial pneumonitis, follow-up . COMPARISON: Several recent prior studies. TECHNIQUE: A portable upright AP radiograph of the chest was obtained. FINDINGS: A right upper extremity PICC catheter extending into the neck has been removed.  Moderate to marked diffuse airspace and interstitial infiltrates with small pleural effusions has not significantly changed from yesterday, given differences in technique. The heart remains mildly enlarged. A prosthetic aortic valve and left subclavian approach single lead pacemaker is again noted. There is no pneumothorax, or other significant changes identified. STABLE CHEST COMPARED TO YESTERDAY. Xr Chest Portable    Result Date: 10/26/2018  EXAMINATION: XR CHEST PORTABLE CLINICAL HISTORY:  Acute interstitial pneumonitis COMPARISONS: October 19, 2018. October 20, 2018. October 23, 2018. FINDINGS: Single AP portable view the chest was obtained on October 26, 2018 at 0544 hours. Confluent, generalized bilateral atypical infiltrate versus pulmonary edema has worsened. The mediastinal silhouette is almost completely obscured. There is an endovascular aortic stent in place. Right upper extremity PICC has been displaced and is now in the internal jugular vein on the right. The left-sided single lead permanent pacemaker is unchanged. There is mild cardiomegaly. Mediastinum is not widened or  shifted. Small volume of subpulmonic effusion is suspected. CONCLUSION: PRONOUNCED WORSENING, GENERALIZED, CONFLUENT ATYPICAL INFILTRATE AND/OR EDEMA THROUGHOUT BOTH LUNGS. Xr Chest Portable    Result Date: 10/23/2018  EXAMINATION: XR CHEST PORTABLE, 10/23/2018 9:15 AM History: 82-year-old male, shortness of breath COMPARISON:  October 20, 2018 FINDINGS: Chest-one view: There is cardiomegaly and worsening pulmonary vascular congestion centered around the perihilar regions. There is blunting of the right costophrenic angle, most consistent with a small right pleural effusion. There are degenerative changes of the spine. An aortic valvular stent is noted. There is a left chest wall cardiac device with a single lead. Monitoring leads project across the thorax. There is a right upper extremity approach PICC line, the tip terminates in the region of the distal SVC. Cardiomegaly and worsening pulmonary vascular congestion consistent with CHF.      Xr Chest Portable    Result Date: A 5-[place] triple-lumen PICC was advanced through the sheath, up the arm and into the central vasculature. It was positioned appropriately. The sheath was removed. The catheter was shown to aspirate and infuse properly. The flange of the catheter was affixed to the arm using a PICC securement device. A spot image of the chest showed the tip of the PICC line to lie in the superior vena cava. The patient tolerated the procedure well and without complications. Number of films: 1 Fluoroscopy time: 18.1 seconds CONCLUSION: SUCCESSFUL RUE PICC PLACEMENT WITHOUT IMMEDIATE COMPLICATIONS. Ir Picc Wo Sq Port/pump > 5 Years    Result Date: 10/19/2018  PERIPHERALLY INSERTED CENTRAL CATHETER (PICC) PLACEMENT WITH ULTRASOUND GUIDANCE CLINICAL HISTORY:  ICU PATIENT WITH GI BLEED AND HYPOTENSION, PATIENT NEEDS CENTRAL VENOUS ACCESS FOR IV ACCESS AND BLOOD TRANSFUSION, PHYSICIAN REQUESTS PLACEMENT OF PICC LINE After discussing the procedure and possible complications with the patient, informed consent was obtained. The patient was placed on the Special Procedures table. The right upper extremity was sterilely prepared using 2% chlorhexidine. Central venous catheter was inserted using a maximal sterile barrier technique which includes cap, mask, sterile gown, sterile gloves, sterile full-body drape, hand hygiene and 2% chlorhexidine for cutaneous antisepsis. A sterile ultrasound technique with sterile gel and sterile probe covers was also utilized. A pre-procedure time out was performed in order to assure the correct patient and procedure. Local anesthetic was administered. Utilizing sterile gel and sterile probe covers, a peripheral vein was accessed with sonographic guidance. A sonographic spot image was obtained for documentation. A guidewire was advanced into the vein with fluoroscopic guidance and a sheath was placed over the guidewire.   A 5-[place] dual-lumen PICC was advanced through the sheath, up the arm catheter was shown to aspirate and infuse properly. The flange of the catheter was affixed to the arm using a PICC securement device. A spot image of the chest showed the tip of the PICC line to lie in the superior vena cava. The patient tolerated the procedure well and without complications. Number of films: 1 Fluoroscopy time: 18.1 seconds CONCLUSION: SUCCESSFUL RUE PICC PLACEMENT WITHOUT IMMEDIATE COMPLICATIONS. Ir Ultrasound Guidance Vascular Access    Result Date: 10/19/2018  PERIPHERALLY INSERTED CENTRAL CATHETER (PICC) PLACEMENT WITH ULTRASOUND GUIDANCE CLINICAL HISTORY:  ICU PATIENT WITH GI BLEED AND HYPOTENSION, PATIENT NEEDS CENTRAL VENOUS ACCESS FOR IV ACCESS AND BLOOD TRANSFUSION, PHYSICIAN REQUESTS PLACEMENT OF PICC LINE After discussing the procedure and possible complications with the patient, informed consent was obtained. The patient was placed on the Special Procedures table. The right upper extremity was sterilely prepared using 2% chlorhexidine. Central venous catheter was inserted using a maximal sterile barrier technique which includes cap, mask, sterile gown, sterile gloves, sterile full-body drape, hand hygiene and 2% chlorhexidine for cutaneous antisepsis. A sterile ultrasound technique with sterile gel and sterile probe covers was also utilized. A pre-procedure time out was performed in order to assure the correct patient and procedure. Local anesthetic was administered. Utilizing sterile gel and sterile probe covers, a peripheral vein was accessed with sonographic guidance. A sonographic spot image was obtained for documentation. A guidewire was advanced into the vein with fluoroscopic guidance and a sheath was placed over the guidewire. A 5-German dual-lumen PICC was advanced through the sheath, up the arm and into the central vasculature. It was positioned appropriately. The sheath was removed. The catheter was shown to aspirate and infuse properly.   The flange of

## 2018-11-02 NOTE — PROGRESS NOTES
RBC 2.35 11/02/2018    RBC 3.69 08/13/2018    HGB 7.3 11/02/2018    HCT 21.8 11/02/2018    MCV 92.9 11/02/2018    MCH 30.9 11/02/2018    MCHC 33.2 11/02/2018    RDW 16.7 11/02/2018    PLT 49 11/02/2018    MPV 9.2 08/13/2018     CBC with Differential:    Lab Results   Component Value Date    WBC 12.4 11/02/2018    RBC 2.35 11/02/2018    RBC 3.69 08/13/2018    HGB 7.3 11/02/2018    HCT 21.8 11/02/2018    PLT 49 11/02/2018    MCV 92.9 11/02/2018    MCH 30.9 11/02/2018    MCHC 33.2 11/02/2018    RDW 16.7 11/02/2018    NRBC 1 10/18/2018    BANDSPCT 2 10/18/2018    LYMPHOPCT 4.8 10/26/2018    MONOPCT 2.3 10/26/2018    BASOPCT 0.2 10/26/2018    MONOSABS 0.2 10/26/2018    LYMPHSABS 0.5 10/26/2018    EOSABS 0.0 10/26/2018    BASOSABS 0.0 10/26/2018     CMP:    Lab Results   Component Value Date     11/02/2018    K 4.1 11/02/2018    K 3.5 10/23/2018     11/02/2018    CO2 28 11/02/2018    BUN 74 11/02/2018    CREATININE 1.44 11/02/2018    GFRAA 56.9 11/02/2018    AGRATIO 1.4 08/13/2018    LABGLOM 47.0 11/02/2018    GLUCOSE 206 11/02/2018    GLUCOSE 104 08/13/2018    PROT 5.2 11/01/2018    LABALBU 2.8 11/01/2018    LABALBU 4.1 08/13/2018    CALCIUM 7.2 11/02/2018    BILITOT 1.8 11/01/2018    ALKPHOS 93 11/01/2018    AST 61 11/01/2018    ALT 41 11/01/2018     BMP:    Lab Results   Component Value Date     11/02/2018    K 4.1 11/02/2018    K 3.5 10/23/2018     11/02/2018    CO2 28 11/02/2018    BUN 74 11/02/2018    LABALBU 2.8 11/01/2018    LABALBU 4.1 08/13/2018    CREATININE 1.44 11/02/2018    CALCIUM 7.2 11/02/2018    GFRAA 56.9 11/02/2018    LABGLOM 47.0 11/02/2018    GLUCOSE 206 11/02/2018    GLUCOSE 104 08/13/2018     Magnesium:    Lab Results   Component Value Date    MG 2.1 11/01/2018     Troponin:    Lab Results   Component Value Date    TROPONINI 0.035 10/15/2018         Assessment/Plan:    Active Hospital Problems    Diagnosis Date Noted    Chronic atrial fibrillation ([facility] [address] [I48.2]

## 2018-11-03 NOTE — FLOWSHEET NOTE
0720 report at bedside. Pt repositioned. Skin inspected. Noted excoriation to buttocks, zinc cream applied. mepliex in place to coccyx, no breakdown noted underneath. Pt incontinent of small soft brown BM, rafael care done. Repositioned. Dr. Micky Cotto at bedside, updated. [id number] assessment complete see flowsheet. On vent, settings checked. Pupils pinpoint and sluggish. Attempts to open eyes with HOC, does not follow commands. LS dim with rhonchi upper. Oral care done, suctioned thick tan/red secretions from ETT. OG in place, TF infusing, no residual. abd s/nt bs present. Junior CD yellow urine with sediment. PPP 3-4+ edema to hands, R >L, R hand weeping. Trace pitting edema to thighs, scrotal edema noted. Prevalon boots and scd's intact. HOB up. Restraints maintained to prevent line and tube removal. meds per MAR. Will attempt to wean sedation to assess alertness. 1000 pt repositioned. Vss.   1200 pt repositioned using sling and turn assist placed on bed. Oral care done. meds per MAR. No change to assessment from previous. Weaning sedation as tolerated. Pt daughter Ziggy Constantino in, updated, questions answered re: hospice and extubation process. Considering tomorrow for extubation. States she will either be back this evening or call with decision. Dressing to R arm changed, weeping serous fluid from previous IV sites. interdry placed to scrotal area,   Junior care done. 1245 pt sister in to visit, updated. _Electronically signed by Kaia Duque RN on 11/3/2018 at 1:02 PM  [id number] pt awake, opens eyes, moving feet, shaking head yes/no. will squeeze L hand. precedex increased slightly for comfort. Will monitor. _Electronically signed by Kaia Duque RN on 11/3/2018 at 1:15 PM  1400 pt repositioned. Vss.  1600 no change to assessment. Vss. Pt repositioned. meds per MAR. Oral care done. Pt daughter Gilberto Romberg in, updated, discussed POC/answered questions about hospice. At bedside. 1800 I/o's done. Vss.  Resting
Feliberto Gonzalez RN on 10/23/2018 at 4:28 PM   1700 dozing in chair, vss, encouraged to eat dinner. 1730 pt wanting to go back to bed, agreeable to going back to bed after dinner  1800 ate about 50% of meal independently see flowsheet. Assisted x2 person assist to pivot back to bed, more strength noted compared to yesterday. I/o's done. No needs per pt, bed alarm on, will monitor.  _Electronically signed by Feliberto Gonzalez RN on 10/23/2018 at 6:23 PM

## 2018-11-03 NOTE — PROGRESS NOTES
changes from yesterday. ENDOTRACHEAL AND OROGASTRIC TUBE INTUBATION IN GOOD APPARENT POSITION. OTHERWISE, STABLE CHEST COMPARED TO EARLIER TODAY. Xr Chest Portable    Result Date: 10/28/2018  XR CHEST PORTABLE : 10/28/2018 CLINICAL HISTORY:  interstitial pneumonitis . COMPARISON: 10/27/2018. TECHNIQUE: A portable upright AP radiograph of the chest was obtained. FINDINGS: Moderate to severe airspace and interstitial infiltrates throughout both lungs appears unchanged, and is probably ARDS, edema, and/or atypical pneumonia. Small pleural effusions are suspected. The heart remains mildly enlarged with a left subclavian single lead pacemaker and prosthetic aortic valve again noted. There is no pneumothorax, or other significant changes from yesterday. STABLE CHEST COMPARED TO YESTERDAY. Xr Chest Portable    Result Date: 10/27/2018  XR CHEST PORTABLE : 10/27/2018 CLINICAL HISTORY:  Interstitial pneumonitis, follow-up . COMPARISON: Several recent prior studies. TECHNIQUE: A portable upright AP radiograph of the chest was obtained. FINDINGS: A right upper extremity PICC catheter extending into the neck has been removed. Moderate to marked diffuse airspace and interstitial infiltrates with small pleural effusions has not significantly changed from yesterday, given differences in technique. The heart remains mildly enlarged. A prosthetic aortic valve and left subclavian approach single lead pacemaker is again noted. There is no pneumothorax, or other significant changes identified. STABLE CHEST COMPARED TO YESTERDAY. Xr Chest Portable    Result Date: 10/26/2018  EXAMINATION: XR CHEST PORTABLE CLINICAL HISTORY:  Acute interstitial pneumonitis COMPARISONS: October 19, 2018. October 20, 2018. October 23, 2018. FINDINGS: Single AP portable view the chest was obtained on October 26, 2018 at 0544 hours. Confluent, generalized bilateral atypical infiltrate versus pulmonary edema has worsened.  The mediastinal CATHETER (PICC) PLACEMENT WITH ULTRASOUND GUIDANCE CLINICAL HISTORY:  ICU PATIENT NEEDS CENTRAL VENOUS ACCESS FOR FLUID THERAPY AND IV MEDICATIONS, PHYSICIAN REQUESTS PLACEMENT OF PICC LINE After discussing the procedure and possible complications with the patient, informed consent was obtained. The patient was placed on the Special Procedures table. The right upper extremity was sterilely prepared using 2% chlorhexidine. Central venous catheter was inserted using a maximal sterile barrier technique which includes cap, mask, sterile gown, sterile gloves, sterile full-body drape, hand hygiene and 2% chlorhexidine for cutaneous antisepsis. A sterile ultrasound technique with sterile gel and sterile probe covers was also utilized. A pre-procedure time out was performed in order to assure the correct patient and procedure. Local anesthetic was administered. Utilizing sterile gel and sterile probe covers, a peripheral vein was accessed with sonographic guidance. A sonographic spot image was obtained for documentation. A guidewire was advanced into the vein with fluoroscopic guidance and a sheath was placed over the guidewire. A 5-[place] triple-lumen PICC was advanced through the sheath, up the arm and into the central vasculature. It was positioned appropriately. The sheath was removed. The catheter was shown to aspirate and infuse properly. The flange of the catheter was affixed to the arm using a PICC securement device. A spot image of the chest showed the tip of the PICC line to lie in the superior vena cava. The patient tolerated the procedure well and without complications. Number of films: 1 Fluoroscopy time: 18.1 seconds CONCLUSION: SUCCESSFUL RUE PICC PLACEMENT WITHOUT IMMEDIATE COMPLICATIONS.      Ir Ultrasound Guidance Vascular Access    Result Date: 10/19/2018  PERIPHERALLY INSERTED CENTRAL CATHETER (PICC) PLACEMENT WITH ULTRASOUND GUIDANCE CLINICAL HISTORY:  ICU PATIENT WITH GI BLEED AND

## 2018-11-04 NOTE — PLAN OF CARE
to baseline  Mental status will be restored to baseline    Outcome: Ongoing      Problem: Injury - Risk of, Physical Injury:  Goal: Absence of physical injury  Absence of physical injury     Outcome: Ongoing    Goal: Will remain free from falls  Will remain free from falls     Outcome: Ongoing      Problem: Mood - Altered:  Goal: Mood stable  Mood stable    Outcome: Ongoing    Goal: Absence of abusive behavior  Absence of abusive behavior    Outcome: Ongoing    Goal: Verbalizations of feeling emotionally comfortable while being cared for will increase  Verbalizations of feeling emotionally comfortable while being cared for will increase    Outcome: Ongoing      Problem: Psychomotor Activity - Altered:  Goal: Absence of psychomotor disturbance signs and symptoms  Absence of psychomotor disturbance signs and symptoms    Outcome: Ongoing      Problem: Sensory Perception - Impaired:  Goal: Demonstrations of improved sensory functioning will increase  Demonstrations of improved sensory functioning will increase    Outcome: Ongoing    Goal: Decrease in sensory misperception frequency  Decrease in sensory misperception frequency    Outcome: Ongoing    Goal: Able to refrain from responding to false sensory perceptions  Able to refrain from responding to false sensory perceptions    Outcome: Ongoing    Goal: Demonstrates accurate environmental perceptions  Demonstrates accurate environmental perceptions    Outcome: Ongoing    Goal: Able to distinguish between reality-based and nonreality-based thinking  Able to distinguish between reality-based and nonreality-based thinking    Outcome: Ongoing    Goal: Able to interrupt nonreality-based thinking  Able to interrupt nonreality-based thinking    Outcome: Ongoing      Problem: Sleep Pattern Disturbance:  Goal: Appears well-rested  Appears well-rested    Outcome: Ongoing      Problem: Restraint Use - Nonviolent/Non-Self-Destructive Behavior:  Goal: Absence of restraint indications  Absence of restraint indications   Outcome: Ongoing    Goal: Absence of restraint-related injury  Absence of restraint-related injury   Outcome: Ongoing

## 2018-11-04 NOTE — PROGRESS NOTES
Precedex off.  occ opens eyes but not following commands    EKG: paced with AF        Review of Systems:   Review of Systems  vented    Physical Examination:    BP (!) 126/53   Pulse 92   Temp 98.3 °F (36.8 °C) (Oral)   Resp 23   Ht 5' 10\" (1.778 m)   Wt 172 lb 9.9 oz (78.3 kg)   SpO2 97%   BMI 24.77 kg/m²    Physical Exam   Constitutional: No distress. He appears acutely ill. HENT:   Normal cephalic and Atraumatic   Eyes: Pupils are equal, round, and reactive to light. Neck: Normal range of motion and thyroid normal. Neck supple. No JVD present. No neck adenopathy. No thyromegaly present. Cardiovascular: Normal rate, regular rhythm, intact distal pulses and normal pulses. Murmur heard. Pulmonary/Chest: Effort normal and breath sounds normal. He has no wheezes. He has no rales. He exhibits no tenderness. Abdominal: Soft. Bowel sounds are normal. There is no tenderness. Musculoskeletal: Normal range of motion. He exhibits no edema or tenderness. Neurological: He exhibits altered mental status and a cognitive deficit. Skin: Skin is warm. No cyanosis. Nails show no clubbing.        LABS:  CBC:   Lab Results   Component Value Date    WBC 14.3 11/04/2018    RBC 2.71 11/04/2018    RBC 3.69 08/13/2018    HGB 8.3 11/04/2018    HCT 25.4 11/04/2018    MCV 93.9 11/04/2018    MCH 30.8 11/04/2018    MCHC 32.8 11/04/2018    RDW 17.3 11/04/2018    PLT 44 11/04/2018    MPV 9.2 08/13/2018     CBC with Differential:    Lab Results   Component Value Date    WBC 14.3 11/04/2018    RBC 2.71 11/04/2018    RBC 3.69 08/13/2018    HGB 8.3 11/04/2018    HCT 25.4 11/04/2018    PLT 44 11/04/2018    MCV 93.9 11/04/2018    MCH 30.8 11/04/2018    MCHC 32.8 11/04/2018    RDW 17.3 11/04/2018    NRBC 1 10/18/2018    BANDSPCT 2 10/18/2018    LYMPHOPCT 4.8 10/26/2018    MONOPCT 2.3 10/26/2018    BASOPCT 0.2 10/26/2018    MONOSABS 0.2 10/26/2018    LYMPHSABS 0.5 10/26/2018    EOSABS 0.0 10/26/2018    BASOSABS 0.0 10/26/2018

## 2018-11-04 NOTE — PROGRESS NOTES
mg/min (11/03/18 4026)    dextrose      propofol Stopped (10/31/18 1002)    dexmedetomidine (PRECEDEX) IV infusion Stopped (11/04/18 0855)       Medications:  Scheduled Meds:   [START ON 11/6/2018] predniSONE  10 mg Oral Daily    predniSONE  10 mg Oral BID    insulin NPH  15 Units Subcutaneous BID    meropenem  1 g Intravenous Q12H    insulin lispro  0-12 Units Subcutaneous Q4H    pantoprazole  40 mg Intravenous Daily    And    sodium chloride (PF)  10 mL Intravenous Daily    sodium chloride flush  10 mL Intravenous 2 times per day    chlorhexidine  15 mL Mouth/Throat BID    levalbuterol  4 puff Inhalation TID    docusate  100 mg Oral BID    magnesium oxide  400 mg Oral Daily       PRN Meds:  fentanNYL, glucose, dextrose, glucagon (rDNA), dextrose, sodium chloride flush, levalbuterol, acetaminophen, acetaminophen    ABG:     Lab Results   Component Value Date    PHART 7.353 11/02/2018    UON7SJJ 60 11/02/2018    PO2ART 172 11/02/2018    TWW7UOK 33.4 11/02/2018    BEART 8 11/02/2018    P8BWSGVO 99 11/02/2018     Lab Results   Component Value Date    LACTA 1.3 10/16/2018    LACTA 1.3 10/16/2018     O2 Device: Ventilator  O2 Flow Rate (L/min): 60 L/min    MV Settings:     Vent Mode: AC/VC  Vt Ordered: 500 mL  Rate Set: 24 bmp  FiO2 : 50 %  PEEP/CPAP: 8  Peak Inspiratory Pressure: 33 cmH2O  Mean Airway Pressure: 14 cmH20  I:E Ratio: 1;2      Radiology      Ct Abdomen Pelvis Wo Contrast Additional Contrast? None    Result Date: 10/15/2018  EXAMINATION:  CT ABDOMEN PELVIS WO CONTRAST CLINICAL HISTORY:  Possible injury. Pain. Patient found on floor, laying on a mattress COMPARISONS:  January 1, 2010 TECHNIQUE:  Spiral axial images of the abdomen and pelvis were obtained without intravenous contrast enhancement. Multiplanar two-dimensional reformatting was performed. Comparison is made to prior examination.  FINDINGS:  The normal volume gallbladder contains homogenous high density material, suggesting were obtained of the brain from the skull base through the vertex. Multiplanar reformats were obtained. FINDINGS: Prominence of the sulci and ventricles. Nonspecific bilateral supratentorial white matter hypoattenuation appears similar to prior examination. Gray-white matter differentiation is preserved. No acute hemorrhage or abnormal extra-axial fluid collection. Basal cisterns are patent. No mass effect or midline shift. The visualized paranasal sinuses and mastoid air cells are clear. Calvarium is intact. No acute intracranial process. 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 All CT scans at this facility use dose modulation, iterative reconstruction, and/or weight based dosing when appropriate to reduce radiation dose to as low as reasonably achievable. Cta Chest W Wo Contrast    Result Date: 10/24/2018  CTA CHEST W WO CONTRAST: 10/24/2018 CLINICAL HISTORY: Hypoxemia. COMPARISON: Chest CT without contrast 5/21/2015. Spiral enhanced images were obtained of the chest during the infusion of approximately 100 mL of Isovue 370 contrast with pulmonary artery  CTA protocol. Routine and volume rendered images were obtained on a 3-dimensional workstation. All CT scans at this facility use dose modulation, iterative reconstruction, and/or weight based dosing when appropriate to reduce radiation dose to as low as reasonably achievable. FINDINGS: There are no filling defects identified within the pulmonary arterial vasculature to suggest pulmonary emboli. The heart is mildly enlarged with a previous aortic valve stent prosthesis, and a left subclavian single lead pacemaker. Mitral valve calcification is again noted. The thoracic aorta is normal in caliber and tortuous with mild atherosclerotic plaquing. There is no dissection.  Moderate-sized layering pleural effusions are present films: 1 Fluoroscopy time: 18.1 seconds CONCLUSION: SUCCESSFUL RUE PICC PLACEMENT WITHOUT IMMEDIATE COMPLICATIONS. Ir Picc Wo Sq Port/pump > 5 Years    Result Date: 10/19/2018  PERIPHERALLY INSERTED CENTRAL CATHETER (PICC) PLACEMENT WITH ULTRASOUND GUIDANCE CLINICAL HISTORY:  ICU PATIENT WITH GI BLEED AND HYPOTENSION, PATIENT NEEDS CENTRAL VENOUS ACCESS FOR IV ACCESS AND BLOOD TRANSFUSION, PHYSICIAN REQUESTS PLACEMENT OF PICC LINE After discussing the procedure and possible complications with the patient, informed consent was obtained. The patient was placed on the Special Procedures table. The right upper extremity was sterilely prepared using 2% chlorhexidine. Central venous catheter was inserted using a maximal sterile barrier technique which includes cap, mask, sterile gown, sterile gloves, sterile full-body drape, hand hygiene and 2% chlorhexidine for cutaneous antisepsis. A sterile ultrasound technique with sterile gel and sterile probe covers was also utilized. A pre-procedure time out was performed in order to assure the correct patient and procedure. Local anesthetic was administered. Utilizing sterile gel and sterile probe covers, a peripheral vein was accessed with sonographic guidance. A sonographic spot image was obtained for documentation. A guidewire was advanced into the vein with fluoroscopic guidance and a sheath was placed over the guidewire. A 5-[place] dual-lumen PICC was advanced through the sheath, up the arm and into the central vasculature. It was positioned appropriately. The sheath was removed. The catheter was shown to aspirate and infuse properly. The flange of the catheter was affixed to the arm using a PICC securement device. A spot image of the chest showed the tip of the PICC line to lie in the superior vena cava. The patient tolerated the procedure well and without complications.   Number of films: 1 Fluoroscopy time: 43.9 seconds CONCLUSION: SUCCESSFUL RUE PICC 10/19/2018  PERIPHERALLY INSERTED CENTRAL CATHETER (PICC) PLACEMENT WITH ULTRASOUND GUIDANCE CLINICAL HISTORY:  ICU PATIENT WITH GI BLEED AND HYPOTENSION, PATIENT NEEDS CENTRAL VENOUS ACCESS FOR IV ACCESS AND BLOOD TRANSFUSION, PHYSICIAN REQUESTS PLACEMENT OF PICC LINE After discussing the procedure and possible complications with the patient, informed consent was obtained. The patient was placed on the Special Procedures table. The right upper extremity was sterilely prepared using 2% chlorhexidine. Central venous catheter was inserted using a maximal sterile barrier technique which includes cap, mask, sterile gown, sterile gloves, sterile full-body drape, hand hygiene and 2% chlorhexidine for cutaneous antisepsis. A sterile ultrasound technique with sterile gel and sterile probe covers was also utilized. A pre-procedure time out was performed in order to assure the correct patient and procedure. Local anesthetic was administered. Utilizing sterile gel and sterile probe covers, a peripheral vein was accessed with sonographic guidance. A sonographic spot image was obtained for documentation. A guidewire was advanced into the vein with fluoroscopic guidance and a sheath was placed over the guidewire. A 5-[place] dual-lumen PICC was advanced through the sheath, up the arm and into the central vasculature. It was positioned appropriately. The sheath was removed. The catheter was shown to aspirate and infuse properly. The flange of the catheter was affixed to the arm using a PICC securement device. A spot image of the chest showed the tip of the PICC line to lie in the superior vena cava. The patient tolerated the procedure well and without complications. Number of films: 1 Fluoroscopy time: 43.9 seconds CONCLUSION: SUCCESSFUL RUE PICC PLACEMENT WITHOUT IMMEDIATE COMPLICATIONS.        Results:  CBC:   Recent Labs      11/02/18   0659  11/02/18 2000 11/03/18   0506  11/04/18   0519   WBC  12.4*   --

## 2018-11-05 PROCEDURE — 93010 ELECTROCARDIOGRAM REPORT: CPT | Performed by: INTERNAL MEDICINE

## 2023-11-04 NOTE — PROGRESS NOTES
fever   Vital signs stable    EKG: Paced with AF        Review of Systems:   Review of Systems   Constitutional: Negative for diaphoresis and fatigue. HENT: Negative. Eyes: Negative. Respiratory: Positive for shortness of breath. Negative for cough, chest tightness, wheezing and stridor. Cardiovascular: Negative. Negative for chest pain, palpitations and leg swelling. Gastrointestinal: Negative. Negative for blood in stool and nausea. Genitourinary: Negative. Musculoskeletal: Negative. Skin: Negative. Neurological: Positive for weakness. Negative for dizziness, syncope and light-headedness. Hematological: Negative. Psychiatric/Behavioral: Negative. Physical Examination:    BP (!) 112/45   Pulse 76   Temp 99.1 °F (37.3 °C) (Axillary)   Resp (!) 35   Ht 5' 10\" (1.778 m)   Wt 164 lb 14.5 oz (74.8 kg)   SpO2 93%   BMI 23.66 kg/m²    Physical Exam   Constitutional: No distress. He appears acutely ill. HENT:   Normal cephalic and Atraumatic   Eyes: Pupils are equal, round, and reactive to light. Neck: Normal range of motion and thyroid normal. Neck supple. No JVD present. No neck adenopathy. No thyromegaly present. Cardiovascular: Normal rate, regular rhythm, intact distal pulses and normal pulses. Murmur heard. Pulmonary/Chest: Effort normal. He has no wheezes. He has bibasilar rales. He exhibits no tenderness. Abdominal: Soft. Bowel sounds are normal. There is no tenderness. Musculoskeletal: Normal range of motion. He exhibits edema (1+ LE 1-2 + UE (R)). He exhibits no tenderness. Neurological: He is alert and oriented to person, place, and time. Skin: Skin is warm. No cyanosis. Nails show no clubbing.        LABS:  CBC:   Lab Results   Component Value Date    WBC 14.0 10/24/2018    RBC 2.50 10/24/2018    RBC 3.69 08/13/2018    HGB 7.9 10/24/2018    HCT 23.8 10/24/2018    MCV 95.0 10/24/2018    MCH 31.4 10/24/2018    MCHC 33.1 10/24/2018    RDW 17.9 TTE 07/2023: mild LVH, mild reduced LVH hypertrophy, normal RV function, LV systolic function mild decr EF 50-55%. home meds: imdur 30 once a day, lisinopril 20mg once a day, toprol 12.5mg once a day   - c/w Toprol 12.5 mg- converted to IV 1.25 q6 given pt cannot tolerate PO  - hold imdur given soft BPs, resume when appropriate   - hold lisinopril given prior ELENITA and unable to tolerate PO

## (undated) DEVICE — COVER DUST PERIMETER HUMPREY

## (undated) DEVICE — BW-412T DISP COMBO CLEANING BRUSH: Brand: SINGLE USE COMBINATION CLEANING BRUSH

## (undated) DEVICE — SNARE ENDOSCP AD L240CM LOOP W10MM SHTH DIA2.4MM RND INSUL

## (undated) DEVICE — GLOVE ORANGE PI 8   MSG9080

## (undated) DEVICE — CONMED SCOPE SAVER BITE BLOCK, 20X27 MM: Brand: SCOPE SAVER

## (undated) DEVICE — SONY PRINTER PAPER

## (undated) DEVICE — Device: Brand: ENDO SMARTCAP

## (undated) DEVICE — TUBE SET 96 MM 64 MM H2O PERISTALTIC STD AUX CHANNEL

## (undated) DEVICE — 1200CC COLLECTION UNIT 6MM X 6' PATIENT: Brand: MEDI-VAC

## (undated) DEVICE — ADAPTER FLSH PMP FLD MGMT GI IRRIG OFP 2 DISPOSABLE

## (undated) DEVICE — ENDO CARRY-ON PROCEDURE KIT: Brand: ENDO CARRY-ON PROCEDURE KIT